# Patient Record
Sex: MALE | Race: WHITE | NOT HISPANIC OR LATINO | Employment: OTHER | URBAN - METROPOLITAN AREA
[De-identification: names, ages, dates, MRNs, and addresses within clinical notes are randomized per-mention and may not be internally consistent; named-entity substitution may affect disease eponyms.]

---

## 2017-08-07 ENCOUNTER — PATIENT OUTREACH (OUTPATIENT)
Dept: HEALTH INFORMATION MANAGEMENT | Facility: OTHER | Age: 67
End: 2017-08-07

## 2017-08-07 NOTE — PROGRESS NOTES
Attempt #:1    WebIZ Checked & Epic Updated: no  [facility] Verified: no  Verify PCP: no    Communication Preference Obtained: no     Review Care Team: no    Annual Wellness Visit Scheduling  1. Scheduling Status:Not Scheduled. Patient states they think this is a scam     Care Gap Scheduling (Attempt to Schedule EACH Overdue Care Gap!)     Health Maintenance Due   Topic Date Due   • Annual Wellness Visit  DECLINED    • IMM DTaP/Tdap/Td Vaccine (1 - Tdap) NOT ABLE TO ADDRESS    • COLONOSCOPY  NOT ABLE TO ADDRESS    • IMM ZOSTER VACCINE  NOT ABLE TO ADDRESS    • IMM PNEUMOCOCCAL 65+ (ADULT) LOW/MEDIUM RISK SERIES (1 of 2 - PCV13) NOT ABLE TO ADDRESS          MyChart Activation: sent activation code  [employer] Sindhu: no  Virtual Visits: no  Opt In to Text Messages: no

## 2018-03-12 ENCOUNTER — APPOINTMENT (RX ONLY)
Dept: URBAN - METROPOLITAN AREA CLINIC 121 | Facility: CLINIC | Age: 68
Setting detail: DERMATOLOGY
End: 2018-03-12

## 2018-03-12 DIAGNOSIS — D485 NEOPLASM OF UNCERTAIN BEHAVIOR OF SKIN: ICD-10-CM

## 2018-03-12 DIAGNOSIS — R60.0 LOCALIZED EDEMA: ICD-10-CM

## 2018-03-12 DIAGNOSIS — L57.0 ACTINIC KERATOSIS: ICD-10-CM

## 2018-03-12 DIAGNOSIS — L82.1 OTHER SEBORRHEIC KERATOSIS: ICD-10-CM

## 2018-03-12 DIAGNOSIS — L81.4 OTHER MELANIN HYPERPIGMENTATION: ICD-10-CM

## 2018-03-12 PROBLEM — D48.5 NEOPLASM OF UNCERTAIN BEHAVIOR OF SKIN: Status: ACTIVE | Noted: 2018-03-12

## 2018-03-12 PROCEDURE — ? TREATMENT REGIMEN

## 2018-03-12 PROCEDURE — ? LIQUID NITROGEN

## 2018-03-12 PROCEDURE — 11100: CPT | Mod: 59

## 2018-03-12 PROCEDURE — ? COUNSELING

## 2018-03-12 PROCEDURE — 17004 DESTROY PREMAL LESIONS 15/>: CPT

## 2018-03-12 PROCEDURE — 99203 OFFICE O/P NEW LOW 30 MIN: CPT | Mod: 25

## 2018-03-12 PROCEDURE — ? BIOPSY BY SHAVE METHOD

## 2018-03-12 PROCEDURE — 69100 BIOPSY OF EXTERNAL EAR: CPT

## 2018-03-12 PROCEDURE — ? REFERRAL CORRESPONDENCE

## 2018-03-12 PROCEDURE — ? PRESCRIPTION

## 2018-03-12 RX ORDER — MUPIROCIN 20 MG/G
OINTMENT TOPICAL
Qty: 1 | Refills: 2 | Status: ERX

## 2018-03-12 ASSESSMENT — LOCATION SIMPLE DESCRIPTION DERM
LOCATION SIMPLE: RIGHT EAR
LOCATION SIMPLE: LEFT CHEEK
LOCATION SIMPLE: RIGHT ZYGOMA
LOCATION SIMPLE: RIGHT PRETIBIAL REGION
LOCATION SIMPLE: RIGHT TEMPLE
LOCATION SIMPLE: NOSE
LOCATION SIMPLE: LEFT TEMPLE
LOCATION SIMPLE: RIGHT FOREHEAD
LOCATION SIMPLE: LEFT FOREHEAD
LOCATION SIMPLE: RIGHT EYEBROW
LOCATION SIMPLE: RIGHT CHEEK
LOCATION SIMPLE: LEFT FOREARM
LOCATION SIMPLE: RIGHT UPPER BACK
LOCATION SIMPLE: LEFT EAR
LOCATION SIMPLE: LEFT PRETIBIAL REGION

## 2018-03-12 ASSESSMENT — LOCATION DETAILED DESCRIPTION DERM
LOCATION DETAILED: LEFT PROXIMAL PRETIBIAL REGION
LOCATION DETAILED: RIGHT CENTRAL ZYGOMA
LOCATION DETAILED: LEFT INFERIOR CENTRAL MALAR CHEEK
LOCATION DETAILED: RIGHT SUPERIOR CENTRAL BUCCAL CHEEK
LOCATION DETAILED: RIGHT INFERIOR UPPER BACK
LOCATION DETAILED: LEFT SUPERIOR LATERAL BUCCAL CHEEK
LOCATION DETAILED: LEFT CYMBA CONCHA
LOCATION DETAILED: RIGHT SUPERIOR MEDIAL FOREHEAD
LOCATION DETAILED: RIGHT FOREHEAD
LOCATION DETAILED: LEFT DISTAL PRETIBIAL REGION
LOCATION DETAILED: LEFT DISTAL DORSAL FOREARM
LOCATION DETAILED: RIGHT PROXIMAL PRETIBIAL REGION
LOCATION DETAILED: LEFT MID TEMPLE
LOCATION DETAILED: RIGHT MEDIAL ZYGOMA
LOCATION DETAILED: LEFT SUPERIOR CENTRAL BUCCAL CHEEK
LOCATION DETAILED: LEFT INFERIOR FOREHEAD
LOCATION DETAILED: RIGHT CENTRAL EYEBROW
LOCATION DETAILED: RIGHT SUPERIOR HELIX
LOCATION DETAILED: RIGHT INFERIOR MEDIAL MALAR CHEEK
LOCATION DETAILED: RIGHT CENTRAL MANDIBULAR CHEEK
LOCATION DETAILED: NASAL ROOT
LOCATION DETAILED: RIGHT SUPERIOR LATERAL MALAR CHEEK
LOCATION DETAILED: RIGHT SUPERIOR MEDIAL MALAR CHEEK
LOCATION DETAILED: RIGHT DISTAL PRETIBIAL REGION
LOCATION DETAILED: LEFT MEDIAL MALAR CHEEK
LOCATION DETAILED: LEFT SUPERIOR TEMPLE
LOCATION DETAILED: RIGHT INFERIOR LATERAL MALAR CHEEK
LOCATION DETAILED: LEFT INFERIOR LATERAL FOREHEAD
LOCATION DETAILED: RIGHT LATERAL TEMPLE

## 2018-03-12 ASSESSMENT — LOCATION ZONE DERM
LOCATION ZONE: LEG
LOCATION ZONE: FACE
LOCATION ZONE: TRUNK
LOCATION ZONE: EAR
LOCATION ZONE: NOSE
LOCATION ZONE: ARM

## 2018-03-12 NOTE — HPI: EVALUATION OF SKIN LESION(S)
How Severe Are Your Spot(S)?: moderate
Have Your Spot(S) Been Treated In The Past?: has not been treated
Hpi Title: Evaluation of Skin Lesions
Additional History: Patient presents for a full body skin exam

## 2018-03-12 NOTE — PROCEDURE: BIOPSY BY SHAVE METHOD
Render Post-Care Instructions In Note?: no
Dressing: bandage
Electrodesiccation And Curettage Text: The wound bed was treated with electrodesiccation and curettage after the biopsy was performed.
Billing Type: [employer]
Post-Care Instructions: I reviewed with the patient in detail post-care instructions. Patient is to keep the biopsy site dry overnight, and then apply bacitracin twice daily until healed. Patient may apply hydrogen peroxide soaks to remove any crusting.
X Size Of Lesion In Cm: 0
Type Of Destruction Used: Curettage
Silver Nitrate Text: The wound bed was treated with silver nitrate after the biopsy was performed.
Anesthesia Volume In Cc (Will Not Render If 0): 0.5
Detail Level: Detailed
Biopsy Method: 15 blade
Size Of Lesion In Cm: 0.6
Path Notes (To The Dermatopathologist): 0.6cm
Notification Instructions: Patient will be notified of biopsy results. However, patient instructed to call the office if not contacted within 2 weeks.
Anesthesia Type: 1% lidocaine with epinephrine and a 1:10 solution of 8.4% sodium bicarbonate
Curettage Text: The wound bed was treated with curettage after the biopsy was performed.
Wound Care: Vaseline
Biopsy Type: H and E
Electrodesiccation Text: The wound bed was treated with electrodesiccation after the biopsy was performed.
Cryotherapy Text: The wound bed was treated with cryotherapy after the biopsy was performed.
Hemostasis: Aluminum Chloride
Lab: [id number] [facility] [address]
Consent: Written consent was obtained and risks were reviewed including but not limited to scarring, infection, bleeding, scabbing, incomplete removal, nerve damage and allergy to anesthesia.
Size Of Lesion In Cm: 1.1
Lab: [id number]
Billing Type: Third-Party Bill
Path Notes (To The Dermatopathologist): 1.1cm
Biopsy Method: Personna blade

## 2018-03-15 ENCOUNTER — APPOINTMENT (RX ONLY)
Dept: URBAN - METROPOLITAN AREA CLINIC 43 | Facility: CLINIC | Age: 68
Setting detail: DERMATOLOGY
End: 2018-03-15

## 2018-03-15 DIAGNOSIS — Z01.818 ENCOUNTER FOR OTHER PREPROCEDURAL EXAMINATION: ICD-10-CM

## 2018-03-15 PROCEDURE — ? PRESCRIPTION

## 2018-03-15 PROCEDURE — ? COUNSELING

## 2018-03-15 RX ORDER — AMOXICILLIN 500 MG/1
CAPSULE ORAL
Qty: 4 | Refills: 0 | Status: ERX | COMMUNITY
Start: 2018-03-15

## 2018-03-15 RX ADMIN — AMOXICILLIN: 500 CAPSULE ORAL at 00:00

## 2018-03-20 ENCOUNTER — APPOINTMENT (RX ONLY)
Dept: URBAN - METROPOLITAN AREA CLINIC 121 | Facility: CLINIC | Age: 68
Setting detail: DERMATOLOGY
End: 2018-03-20

## 2018-03-20 PROBLEM — D03.62 MELANOMA IN SITU OF LEFT UPPER LIMB, INCLUDING SHOULDER: Status: ACTIVE | Noted: 2018-03-20

## 2018-03-20 PROCEDURE — ? MOHS SURGERY

## 2018-03-20 PROCEDURE — 17314 MOHS ADDL STAGE T/A/L: CPT | Mod: 79

## 2018-03-20 PROCEDURE — ? REPAIR NOTE

## 2018-03-20 PROCEDURE — 13121 CMPLX RPR S/A/L 2.6-7.5 CM: CPT | Mod: 79

## 2018-03-20 PROCEDURE — ? ADDITIONAL PATHOLOGY SPECIMEN

## 2018-03-20 PROCEDURE — 17313 MOHS 1 STAGE T/A/L: CPT | Mod: 79

## 2018-03-20 NOTE — PROCEDURE: MOHS SURGERY
Advancement Flap (Double) Text: The defect edges were debeveled with a #15 scalpel blade. Given the location of the defect and the proximity to free margins a double advancement flap was deemed most appropriate. Using a sterile surgical marker, the appropriate advancement flaps were drawn incorporating the defect and placing the expected incisions within the relaxed skin tension lines where possible. The area thus outlined was incised deep to adipose tissue with a #15 scalpel blade. The skin margins were undermined to an appropriate distance in all directions utilizing iris scissors.
O-Z Plasty Text: The defect edges were debeveled with a #15 scalpel blade. Given the location of the defect, shape of the defect and the proximity to free margins an O-Z plasty (double transposition flap) was deemed most appropriate. Using a sterile surgical marker, the appropriate transposition flaps were drawn incorporating the defect and placing the expected incisions within the relaxed skin tension lines where possible. The area thus outlined was incised deep to adipose tissue with a #15 scalpel blade. The skin margins were undermined to an appropriate distance in all directions utilizing iris scissors. Hemostasis was achieved with electrocautery. The flaps were then transposed into place, one clockwise and the other counterclockwise, and anchored with interrupted buried subcutaneous sutures.
Stage 2: Additional Anesthesia Volume In Cc: 0
Stage 7: Additional Anesthesia Type: 1% lidocaine with epinephrine
Consent (Temporal Branch)/Introductory Paragraph: The rationale for Mohs was explained to the patient and consent was obtained. The risks, benefits and alternatives to therapy were discussed in detail. Specifically, the risks of damage to the temporal branch of the facial nerve, infection, scarring, bleeding, prolonged wound healing, incomplete removal, allergy to anesthesia, and recurrence were addressed. Prior to the procedure, the treatment site was clearly identified and confirmed by the patient. All components of Universal Protocol/PAUSE Rule completed.
Dermal Autograft Text: The defect edges were debeveled with a #15 scalpel blade. Given the location of the defect, shape of the defect and the proximity to free margins a dermal autograft was deemed most appropriate. Using a sterile surgical marker, the primary defect shape was transferred to the donor site. The area thus outlined was incised deep to adipose tissue with a #15 scalpel blade. The harvested graft was then trimmed of adipose and epidermal tissue until only dermis was left. The skin graft was then placed in the primary defect and oriented appropriately.
Crescentic Complex Repair Preamble Text (Leave Blank If You Do Not Want): Extensive wide undermining was performed.
Closure 4 Information: This tab is for additional flaps and grafts above and beyond our usual structured repairs. Please note if you enter information here it will not currently bill and you will need to add the billing information manually.
Consent (Scalp)/Introductory Paragraph: The rationale for Mohs was explained to the patient and consent was obtained. The risks, benefits and alternatives to therapy were discussed in detail. Specifically, the risks of changes in hair growth pattern secondary to repair, infection, scarring, bleeding, prolonged wound healing, incomplete removal, allergy to anesthesia, nerve injury and recurrence were addressed. Prior to the procedure, the treatment site was clearly identified and confirmed by the patient. All components of Universal Protocol/PAUSE Rule completed.
Scc In Situ Histology Text: There is hyperkeratosis, acanthosis, and cytologic atypia of keratinocytes with hyperchromatic and pleomorphic nuclei throughout the full thickness of the epidermis. No dermal invasion is seen. Chronic inflammation is present in the dermis.
Mohs Method Verbiage: An incision at a 45 degree angle following the standard Mohs approach was done and the specimen was harvested as a microscopic controlled layer.
Date Of Previous Biopsy (Optional): 03-12-18
Trilobed Flap Text: The defect edges were debeveled with a #15 scalpel blade. Given the location of the defect and the proximity to free margins a trilobed flap was deemed most appropriate. Using a sterile surgical marker, an appropriate trilobed flap drawn around the defect. The area thus outlined was incised deep to adipose tissue with a #15 scalpel blade. The skin margins were undermined to an appropriate distance in all directions utilizing iris scissors.
Hatchet Flap Text: The defect edges were debeveled with a #15 scalpel blade. Given the location of the defect, shape of the defect and the proximity to free margins a hatchet flap was deemed most appropriate. Using a sterile surgical marker, an appropriate hatchet flap was drawn incorporating the defect and placing the expected incisions within the relaxed skin tension lines where possible. The area thus outlined was incised deep to adipose tissue with a #15 scalpel blade. The skin margins were undermined to an appropriate distance in all directions utilizing iris scissors.
Referred To Plastics For Closure Text (Leave Blank If You Do Not Want): After obtaining clear surgical margins the patient was sent to plastics for surgical repair. The patient understands they will receive post-surgical care and follow-up from the referring physician's office.
Modified Advancement Flap Text: The defect edges were debeveled with a #15 scalpel blade. Given the location of the defect, shape of the defect and the proximity to free margins a modified advancement flap was deemed most appropriate. Using a sterile surgical marker, an appropriate advancement flap was drawn incorporating the defect and placing the expected incisions within the relaxed skin tension lines where possible. The area thus outlined was incised deep to adipose tissue with a #15 scalpel blade. The skin margins were undermined to an appropriate distance in all directions utilizing iris scissors.
Include Size Of Lesion In Location Indication Statement: Yes
Hemostasis: Electrocautery
Split-Thickness Skin Graft Text: The defect edges were debeveled with a #15 scalpel blade. Given the location of the defect, shape of the defect and the proximity to free margins a split thickness skin graft was deemed most appropriate. Using a sterile surgical marker, the primary defect shape was transferred to the donor site. The split thickness graft was then harvested. The skin graft was then placed in the primary defect and oriented appropriately.
Scc Histology Text: There is hyperkeratosis, acanthosis, and cytologic atypia of keratinocytes with hyperchromatic and pleomorphic nuclei throughout the epidermis. No dermal invasion is seen. Chronic inflammation is present in the dermis.
Helical Rim Advancement Flap Text: The defect edges were debeveled with a #15 blade scalpel. Given the location of the defect and the proximity to free margins (helical rim) a double helical rim advancement flap was deemed most appropriate. Using a sterile surgical marker, the appropriate advancement flaps were drawn incorporating the defect and placing the expected incisions between the helical rim and antihelix where possible. The area thus outlined was incised through and through with a #15 scalpel blade. With a skin hook and iris scissors, the flaps were gently and sharply undermined and freed up.
Consent Type: Consent 1 (Standard)
Bcc Infiltrative Histology Text: There were numerous aggregates of basaloid cells demonstrating an infiltrative pattern
Muscle Hinge Flap Text: The defect edges were debeveled with a #15 scalpel blade. Given the size, depth and location of the defect and the proximity to free margins a muscle hinge flap was deemed most appropriate. Using a sterile surgical marker, an appropriate hinge flap was drawn incorporating the defect. The area thus outlined was incised with a #15 scalpel blade. The skin margins were undermined to an appropriate distance in all directions utilizing iris scissors.
Epidermal Sutures: 6-0 Ethilon
Posterior Auricular Interpolation Flap Text: A decision was made to reconstruct the defect utilizing an interpolation axial flap and a staged reconstruction. A telfa template was made of the defect. This telfa template was then used to outline the posterior auricular interpolation flap. The donor area for the pedicle flap was then injected with anesthesia. The flap was excised through the skin and subcutaneous tissue down to the layer of the underlying musculature. The pedicle flap was carefully excised within this deep plane to maintain its blood supply. The edges of the donor site were undermined. The donor site was closed in a primary fashion. The pedicle was then rotated into position and sutured. Once the tube was sutured into place, adequate blood supply was confirmed with blanching and refill. The pedicle was then wrapped with xeroform gauze and dressed appropriately with a telfa and gauze bandage to ensure continued blood supply and protect the attached pedicle.
Consent (Spinal Accessory)/Introductory Paragraph: The rationale for Mohs was explained to the patient and consent was obtained. The risks, benefits and alternatives to therapy were discussed in detail. Specifically, the risks of damage to the spinal accessory nerve, infection, scarring, bleeding, prolonged wound healing, incomplete removal, allergy to anesthesia, and recurrence were addressed. Prior to the procedure, the treatment site was clearly identified and confirmed by the patient. All components of Universal Protocol/PAUSE Rule completed.
Mauc Instructions: By selecting yes to the question below the [facility] number will be added into the note. This will be calculated automatically based on the diagnosis chosen, the size entered, the body zone selected (H,M,L) and the specific indications you chose. You will also have the option to override the Mohs AUC if you disagree with the automatically calculated number and this option is found in the Case Summary tab.
Suture Removal: 7 days
Unna Boot Applied: No
V-Y Flap Text: The defect edges were debeveled with a #15 scalpel blade. Given the location of the defect, shape of the defect and the proximity to free margins a V-Y flap was deemed most appropriate. Using a sterile surgical marker, an appropriate advancement flap was drawn incorporating the defect and placing the expected incisions within the relaxed skin tension lines where possible. The area thus outlined was incised deep to adipose tissue with a #15 scalpel blade. The skin margins were undermined to an appropriate distance in all directions utilizing iris scissors.
Surgical Defect Width In Cm (Optional): 3
Consent 2/Introductory Paragraph: Mohs surgery was explained to the patient and consent was obtained. The risks, benefits and alternatives to therapy were discussed in detail. Specifically, the risks of infection, scarring, bleeding, prolonged wound healing, incomplete removal, allergy to anesthesia, nerve injury and recurrence were addressed. Prior to the procedure, the treatment site was clearly identified and confirmed by the patient. All components of Universal Protocol/PAUSE Rule completed.
H Plasty Text: Given the location of the defect, shape of the defect and the proximity to free margins a H-plasty was deemed most appropriate for repair. Using a sterile surgical marker, the appropriate advancement arms of the H-plasty were drawn incorporating the defect and placing the expected incisions within the relaxed skin tension lines where possible. The area thus outlined was incised deep to adipose tissue with a #15 scalpel blade. The skin margins were undermined to an appropriate distance in all directions utilizing iris scissors. The opposing advancement arms were then advanced into place in opposite direction and anchored with interrupted buried subcutaneous sutures.
Full Thickness Lip Wedge Repair (Flap) Text: Given the location of the defect and the proximity to free margins a full thickness wedge repair was deemed most appropriate. Using a sterile surgical marker, the appropriate repair was drawn incorporating the defect and placing the expected incisions perpendicular to the vermilion border. The vermilion border was also meticulously outlined to ensure appropriate reapproximation during the repair. The area thus outlined was incised through and through with a #15 scalpel blade. The muscularis and dermis were reaproximated with deep sutures following hemostasis. Care was taken to realign the vermilion border before proceeding with the superficial closure. Once the vermilion was realigned the superfical and mucosal closure was finished.
Bilobed Flap Text: The defect edges were debeveled with a #15 scalpel blade. Given the location of the defect and the proximity to free margins a bilobe flap was deemed most appropriate. Using a sterile surgical marker, an appropriate bilobe flap drawn around the defect. The area thus outlined was incised deep to adipose tissue with a #15 scalpel blade. The skin margins were undermined to an appropriate distance in all directions utilizing iris scissors.
Area L Indication Text: Tumors in this location are included in Area L (trunk and extremities). Mohs surgery is indicated for larger tumors, or tumors with aggressive histologic features, in these anatomic locations.
Estimated Blood Loss (Cc): minimal
Interpolation Flap Text: A decision was made to reconstruct the defect utilizing an interpolation axial flap and a staged reconstruction. A telfa template was made of the defect. This telfa template was then used to outline the interpolation flap. The donor area for the pedicle flap was then injected with anesthesia. The flap was excised through the skin and subcutaneous tissue down to the layer of the underlying musculature. The interpolation flap was carefully excised within this deep plane to maintain its blood supply. The edges of the donor site were undermined. The donor site was closed in a primary fashion. The pedicle was then rotated into position and sutured. Once the tube was sutured into place, adequate blood supply was confirmed with blanching and refill. The pedicle was then wrapped with xeroform gauze and dressed appropriately with a telfa and gauze bandage to ensure continued blood supply and protect the attached pedicle.
Rhombic Flap Text: The defect edges were debeveled with a #15 scalpel blade. Given the location of the defect and the proximity to free margins a rhombic flap was deemed most appropriate. Using a sterile surgical marker, an appropriate rhombic flap was drawn incorporating the defect. The area thus outlined was incised deep to adipose tissue with a #15 scalpel blade. The skin margins were undermined to an appropriate distance in all directions utilizing iris scissors.
Island Pedicle Flap Text: The defect edges were debeveled with a #15 scalpel blade. Given the location of the defect, shape of the defect and the proximity to free margins an island pedicle advancement flap was deemed most appropriate. Using a sterile surgical marker, an appropriate advancement flap was drawn incorporating the defect, outlining the appropriate donor tissue and placing the expected incisions within the relaxed skin tension lines where possible. The area thus outlined was incised deep to adipose tissue with a #15 scalpel blade. The skin margins were undermined to an appropriate distance in all directions around the primary defect and laterally outward around the island pedicle utilizing iris scissors. There was minimal undermining beneath the pedicle flap.
O-L Flap Text: The defect edges were debeveled with a #15 scalpel blade. Given the location of the defect, shape of the defect and the proximity to free margins an O-L flap was deemed most appropriate. Using a sterile surgical marker, an appropriate advancement flap was drawn incorporating the defect and placing the expected incisions within the relaxed skin tension lines where possible. The area thus outlined was incised deep to adipose tissue with a #15 scalpel blade. The skin margins were undermined to an appropriate distance in all directions utilizing iris scissors.
Same Histology In Subsequent Stages Text: The pattern and morphology of the tumor is as described in the first stage.
Purse String (Intermediate) Text: Given the location of the defect and the characteristics of the surrounding skin a pursestring intermediate closure was deemed most appropriate. Undermining was performed circumfirentially around the surgical defect. A purstring suture was then placed and tightened.
Tarsorrhaphy Text: A tarsorrhaphy was performed using Frost sutures.
Consent (Nose)/Introductory Paragraph: The rationale for Mohs was explained to the patient and consent was obtained. The risks, benefits and alternatives to therapy were discussed in detail. Specifically, the risks of nasal deformity, changes in the flow of air through the nose, infection, scarring, bleeding, prolonged wound healing, incomplete removal, allergy to anesthesia, nerve injury and recurrence were addressed. Prior to the procedure, the treatment site was clearly identified and confirmed by the patient. All components of Universal Protocol/PAUSE Rule completed.
Crescentic Advancement Flap Text: The defect edges were debeveled with a #15 scalpel blade. Given the location of the defect and the proximity to free margins a crescentic advancement flap was deemed most appropriate. Using a sterile surgical marker, the appropriate advancement flap was drawn incorporating the defect and placing the expected incisions within the relaxed skin tension lines where possible. The area thus outlined was incised deep to adipose tissue with a #15 scalpel blade. The skin margins were undermined to an appropriate distance in all directions utilizing iris scissors.
Depth Of Tumor Invasion (For Histology): epidermis
Closure 3 Information: This tab is for additional flaps and grafts above and beyond our usual structured repairs.  Please note if you enter information here it will not currently bill and you will need to add the billing information manually.
Consent (Near Eyelid Margin)/Introductory Paragraph: The rationale for Mohs was explained to the patient and consent was obtained. The risks, benefits and alternatives to therapy were discussed in detail. Specifically, the risks of ectropion or eyelid deformity, infection, scarring, bleeding, prolonged wound healing, incomplete removal, allergy to anesthesia, nerve injury and recurrence were addressed. Prior to the procedure, the treatment site was clearly identified and confirmed by the patient. All components of Universal Protocol/PAUSE Rule completed.
Stage 2: Number Of Blocks?: 1
Home Suture Removal Text: Patient was provided instructions on removing sutures and will remove their sutures at home. If they have any questions or difficulties they will call the office.
Surgical Defect Width In Cm (Optional): 2.5
Previous Accession (Optional): [id number]
Composite Graft Text: The defect edges were debeveled with a #15 scalpel blade. Given the location of the defect, shape of the defect, the proximity to free margins and the fact the defect was full thickness a composite graft was deemed most appropriate. The defect was outline and then transferred to the donor site. A full thickness graft was then excised from the donor site. The graft was then placed in the primary defect, oriented appropriately and then sutured into place. The secondary defect was then repaired using a primary closure.
Repair Type: Referred to plastics for closure
Mohs Case Number: [id number]
Advancement Flap (Single) Text: The defect edges were debeveled with a #15 scalpel blade. Given the location of the defect and the proximity to free margins a single advancement flap was deemed most appropriate. Using a sterile surgical marker, an appropriate advancement flap was drawn incorporating the defect and placing the expected incisions within the relaxed skin tension lines where possible. The area thus outlined was incised deep to adipose tissue with a #15 scalpel blade. The skin margins were undermined to an appropriate distance in all directions utilizing iris scissors.
Alar Island Pedicle Flap Text: The defect edges were debeveled with a #15 scalpel blade. Given the location of the defect, shape of the defect and the proximity to the alar rim an island pedicle advancement flap was deemed most appropriate. Using a sterile surgical marker, an appropriate advancement flap was drawn incorporating the defect, outlining the appropriate donor tissue and placing the expected incisions within the nasal ala running parallel to the alar rim. The area thus outlined was incised with a #15 scalpel blade. The skin margins were undermined minimally to an appropriate distance in all directions around the primary defect and laterally outward around the island pedicle utilizing iris scissors. There was minimal undermining beneath the pedicle flap.
Medical Necessity Statement: Based on my medical judgement; after reviewing the pertinent pathology report, physical exam findings and the various treatment options for skin cancer treatment; standard excision and/or destruction technique methods are not clinically sufficient treatment options. To prevent the risk of compromising surgical cure and reconstruction; prompt microscopic examination of the surgical margins is necessary. Based on the given indication(s), maximum conservation of healthy tissue, and high cure rate, Mohs surgery is the most appropriate treatment for this cancer.
Spiral Flap Text: The defect edges were debeveled with a #15 scalpel blade. Given the location of the defect, shape of the defect and the proximity to free margins a spiral flap was deemed most appropriate. Using a sterile surgical marker, an appropriate rotation flap was drawn incorporating the defect and placing the expected incisions within the relaxed skin tension lines where possible. The area thus outlined was incised deep to adipose tissue with a #15 scalpel blade. The skin margins were undermined to an appropriate distance in all directions utilizing iris scissors.
Z Plasty Text: The lesion was extirpated to the level of the fat with a #15 scalpel blade. Given the location of the defect, shape of the defect and the proximity to free margins a Z-plasty was deemed most appropriate for repair. Using a sterile surgical marker, the appropriate transposition arms of the Z-plasty were drawn incorporating the defect and placing the expected incisions within the relaxed skin tension lines where possible. The area thus outlined was incised deep to adipose tissue with a #15 scalpel blade. The skin margins were undermined to an appropriate distance in all directions utilizing iris scissors. The opposing transposition arms were then transposed into place in opposite direction and anchored with interrupted buried subcutaneous sutures.
Keystone Flap Text: The defect edges were debeveled with a #15 scalpel blade. Given the location of the defect, shape of the defect a keystone flap was deemed most appropriate. Using a sterile surgical marker, an appropriate keystone flap was drawn incorporating the defect, outlining the appropriate donor tissue and placing the expected incisions within the relaxed skin tension lines where possible. The area thus outlined was incised deep to adipose tissue with a #15 scalpel blade. The skin margins were undermined to an appropriate distance in all directions around the primary defect and laterally outward around the flap utilizing iris scissors.
Wound Care: Bacitracin
Number Of Stages: 2
Cheiloplasty (Complex) Text: A decision was made to reconstruct the defect with a  cheiloplasty. The defect was undermined extensively. Additional obicularis oris muscle was excised with a 15 blade scalpel. The defect was converted into a full thickness wedge to facilite a better cosmetic result. Small vessels were then tied off with 5-0 monocyrl. The obicularis oris, superficial fascia, adipose and dermis were then reapproximated. After the deeper layers were approximated the epidermis was reapproximated with particular care given to realign the vermilion border.
Cheek-To-Nose Interpolation Flap Text: A decision was made to reconstruct the defect utilizing an interpolation axial flap and a staged reconstruction. A telfa template was made of the defect. This telfa template was then used to outline the Cheek-To-Nose Interpolation flap. The donor area for the pedicle flap was then injected with anesthesia. The flap was excised through the skin and subcutaneous tissue down to the layer of the underlying musculature. The interpolation flap was carefully excised within this deep plane to maintain its blood supply. The edges of the donor site were undermined. The donor site was closed in a primary fashion. The pedicle was then rotated into position and sutured. Once the tube was sutured into place, adequate blood supply was confirmed with blanching and refill. The pedicle was then wrapped with xeroform gauze and dressed appropriately with a telfa and gauze bandage to ensure continued blood supply and protect the attached pedicle.
Complex Repair And Graft Additional Text (Will Appearing After The Standard Complex Repair Text): The complex repair was not sufficient to completely close the primary defect. The remaining additional defect was repaired with the graft mentioned below.
Skin Substitute Text: The defect edges were debeveled with a #15 scalpel blade. Given the location of the defect, shape of the defect and the proximity to free margins a skin substitute graft was deemed most appropriate. The graft material was trimmed to fit the size of the defect. The graft was then placed in the primary defect and oriented appropriately.
Additional Anesthesia Volume In Cc: 6
Xenograft Text: The defect edges were debeveled with a #15 scalpel blade. Given the location of the defect, shape of the defect and the proximity to free margins a xenograft was deemed most appropriate. The graft was then trimmed to fit the size of the defect. The graft was then placed in the primary defect and oriented appropriately.
Bcc  Nodular Histology Text: There are uniform nodular masses of basaloid neoplastic cells with scanty cytoplasm and peripheral palisading with a loose fibrous stroma. The appearance is typical for a nodular basal cell carcinoma.
Initial Size Of Lesion: 2.2
Inflammation Suggestive Of Cancer Camouflage Histology Text: There was a dense lymphocytic infiltrate which prevented adequate histologic evaluation of adjacent structures.
Presence Of Scar Tissue (For Histology): absent
No Repair - Repaired With Adjacent Surgical Defect Text (Leave Blank If You Do Not Want): After obtaining clear surgical margins the defect was repaired concurrently with another surgical defect which was in close approximation.
Surgeon/Pathologist Verbiage (Will Incorporate Name Of Surgeon From Intro If Not Blank): operated in two distinct and integrated capacities as the surgeon and pathologist.
Referred To Mid-Level For Closure Text (Leave Blank If You Do Not Want): After obtaining clear surgical margins the patient was sent to a mid-level provider for surgical repair. The patient understands they will receive post-surgical care and follow-up from the mid-level provider.
Consent 1/Introductory Paragraph: Various treatment options for skin cancer treatment; including but not limited to no treatment, cryosurgery or cryotherapy, excision, radiation therapy, electrodessication and curettage, topical therapeutic agents and light therapy were discussed in depth with the patient. The rationale for Mohs was explained to the patient and consent was obtained. The risks, benefits and alternatives to therapy were discussed in detail. Specifically, the risks of infection, scarring, bleeding, prolonged wound healing, incomplete removal, allergy to anesthesia, nerve injury and recurrence were addressed. Prior to the procedure, the treatment site was clearly identified and confirmed by the patient and the patient agreed that Mohs surgery is the best treatment option for their lesion. No guarantees were made or implied; close follow-up was stressed out to the patient. All components of Universal Protocol/PAUSE Rule completed. treatment site was clearly identified and confirmed by the patient. All components of Universal Protocol/PAUSE Rule completed.
Bilateral Helical Rim Advancement Flap Text: The defect edges were debeveled with a #15 blade scalpel. Given the location of the defect and the proximity to free margins (helical rim) a bilateral helical rim advancement flap was deemed most appropriate. Using a sterile surgical marker, the appropriate advancement flaps were drawn incorporating the defect and placing the expected incisions between the helical rim and antihelix where possible. The area thus outlined was incised through and through with a #15 scalpel blade. With a skin hook and iris scissors, the flaps were gently and sharply undermined and freed up.
Lazy S Intermediate Repair Preamble Text (Leave Blank If You Do Not Want): Undermining was performed with blunt dissection.
Mastoid Interpolation Flap Text: A decision was made to reconstruct the defect utilizing an interpolation axial flap and a staged reconstruction. A telfa template was made of the defect. This telfa template was then used to outline the mastoid interpolation flap. The donor area for the pedicle flap was then injected with anesthesia. The flap was excised through the skin and subcutaneous tissue down to the layer of the underlying musculature. The pedicle flap was carefully excised within this deep plane to maintain its blood supply. The edges of the donor site were undermined. The donor site was closed in a primary fashion. The pedicle was then rotated into position and sutured. Once the tube was sutured into place, adequate blood supply was confirmed with blanching and refill. The pedicle was then wrapped with xeroform gauze and dressed appropriately with a telfa and gauze bandage to ensure continued blood supply and protect the attached pedicle.
Bi-Rhombic Flap Text: The defect edges were debeveled with a #15 scalpel blade. Given the location of the defect and the proximity to free margins a bi-rhombic flap was deemed most appropriate. Using a sterile surgical marker, an appropriate rhombic flap was drawn incorporating the defect. The area thus outlined was incised deep to adipose tissue with a #15 scalpel blade. The skin margins were undermined to an appropriate distance in all directions utilizing iris scissors.
Transposition Flap Text: The defect edges were debeveled with a #15 scalpel blade. Given the location of the defect and the proximity to free margins a transposition flap was deemed most appropriate. Using a sterile surgical marker, an appropriate transposition flap was drawn incorporating the defect. The area thus outlined was incised deep to adipose tissue with a #15 scalpel blade. The skin margins were undermined to an appropriate distance in all directions utilizing iris scissors.
Surgical Defect Length In Cm (Optional): 3.5
Ear Wedge Repair Text: A wedge excision was completed by carrying down an excision through the full thickness of the ear and cartilage with an inward facing Burow's triangle. The wound was then closed in a layered fashion.
Ftsg Text: The defect edges were debeveled with a #15 scalpel blade. Given the location of the defect, shape of the defect and the proximity to free margins a full thickness skin graft was deemed most appropriate. Using a sterile surgical marker, the primary defect shape was transferred to the donor site. The area thus outlined was incised deep to adipose tissue with a #15 scalpel blade. The harvested graft was then trimmed of adipose tissue until only dermis and epidermis was left. The skin margins of the secondary defect were undermined to an appropriate distance in all directions utilizing iris scissors. The secondary defect was closed with interrupted buried subcutaneous sutures. The skin edges were then re-apposed with running  sutures. The skin graft was then placed in the primary defect and oriented appropriately.
Referred To Otolaryngology For Closure Text (Leave Blank If You Do Not Want): After obtaining clear surgical margins the patient was sent to otolaryngology for surgical repair. The patient understands they will receive post-surgical care and follow-up from the referring physician's office.
Dorsal Nasal Flap Text: The defect edges were debeveled with a #15 scalpel blade. Given the location of the defect and the proximity to free margins a dorsal nasal flap was deemed most appropriate. Using a sterile surgical marker, an appropriate dorsal nasal flap was drawn around the defect. The area thus outlined was incised deep to adipose tissue with a #15 scalpel blade. The skin margins were undermined to an appropriate distance in all directions utilizing iris scissors.
Melolabial Interpolation Flap Text: A decision was made to reconstruct the defect utilizing an interpolation axial flap and a staged reconstruction. A telfa template was made of the defect. This telfa template was then used to outline the melolabial interpolation flap. The donor area for the pedicle flap was then injected with anesthesia. The flap was excised through the skin and subcutaneous tissue down to the layer of the underlying musculature. The pedicle flap was carefully excised within this deep plane to maintain its blood supply. The edges of the donor site were undermined. The donor site was closed in a primary fashion. The pedicle was then rotated into position and sutured. Once the tube was sutured into place, adequate blood supply was confirmed with blanching and refill. The pedicle was then wrapped with xeroform gauze and dressed appropriately with a telfa and gauze bandage to ensure continued blood supply and protect the attached pedicle.
Consent (Marginal Mandibular)/Introductory Paragraph: The rationale for Mohs was explained to the patient and consent was obtained. The risks, benefits and alternatives to therapy were discussed in detail. Specifically, the risks of damage to the marginal mandibular branch of the facial nerve, infection, scarring, bleeding, prolonged wound healing, incomplete removal, allergy to anesthesia, and recurrence were addressed. Prior to the procedure, the treatment site was clearly identified and confirmed by the patient. All components of Universal Protocol/PAUSE Rule completed.
Purse String (Simple) Text: Given the location of the defect and the characteristics of the surrounding skin a pursestring closure was deemed most appropriate. Undermining was performed circumfirentially around the surgical defect. A purstring suture was then placed and tightened.
Wound Care (No Sutures): Petrolatum
W Plasty Text: The lesion was extirpated to the level of the fat with a #15 scalpel blade. Given the location of the defect, shape of the defect and the proximity to free margins a W-plasty was deemed most appropriate for repair. Using a sterile surgical marker, the appropriate transposition arms of the W-plasty were drawn incorporating the defect and placing the expected incisions within the relaxed skin tension lines where possible. The area thus outlined was incised deep to adipose tissue with a #15 scalpel blade. The skin margins were undermined to an appropriate distance in all directions utilizing iris scissors. The opposing transposition arms were then transposed into place in opposite direction and anchored with interrupted buried subcutaneous sutures.
O-T Advancement Flap Text: The defect edges were debeveled with a #15 scalpel blade. Given the location of the defect, shape of the defect and the proximity to free margins an O-T advancement flap was deemed most appropriate. Using a sterile surgical marker, an appropriate advancement flap was drawn incorporating the defect and placing the expected incisions within the relaxed skin tension lines where possible. The area thus outlined was incised deep to adipose tissue with a #15 scalpel blade. The skin margins were undermined to an appropriate distance in all directions utilizing iris scissors.
Consent 3/Introductory Paragraph: I gave the patient a chance to ask questions they had about the procedure. Following this I explained the Mohs procedure and consent was obtained. The risks, benefits and alternatives to therapy were discussed in detail. Specifically, the risks of infection, scarring, bleeding, prolonged wound healing, incomplete removal, allergy to anesthesia, nerve injury and recurrence were addressed. Prior to the procedure, the treatment site was clearly identified and confirmed by the patient. All components of Universal Protocol/PAUSE Rule completed.
Stage 2: Additional Anesthesia Type: 1% lidocaine with epinephrine and a 1:10 solution of 8.4% sodium bicarbonate
Deep Sutures: 5-0 Vicryl
Postop Diagnosis: same
Bilobed Transposition Flap Text: The defect edges were debeveled with a #15 scalpel blade. Given the location of the defect and the proximity to free margins a bilobed transposition flap was deemed most appropriate. Using a sterile surgical marker, an appropriate bilobe flap drawn around the defect. The area thus outlined was incised deep to adipose tissue with a #15 scalpel blade. The skin margins were undermined to an appropriate distance in all directions utilizing iris scissors.
Area M Indication Text: Tumors in this location are included in Area M (cheek, forehead, scalp, neck, jawline and pretibial skin). Mohs surgery is indicated for tumors in these anatomic locations.
Mucosal Advancement Flap Text: Given the location of the defect, shape of the defect and the proximity to free margins a mucosal advancement flap was deemed most appropriate. Incisions were made with a 15 blade scalpel in the appropriate fashion along the cutaneous vermilion border and the mucosal lip. The remaining actinically damaged mucosal tissue was excised. The mucosal advancement flap was then elevated to the gingival sulcus with care taken to preserve the neurovascular structures and advanced into the primary defect. Care was taken to ensure that precise realignment of the vermilion border was achieved.
Consent (Lip)/Introductory Paragraph: The rationale for Mohs was explained to the patient and consent was obtained. The risks, benefits and alternatives to therapy were discussed in detail. Specifically, the risks of lip deformity, changes in the oral aperture, infection, scarring, bleeding, prolonged wound healing, incomplete removal, allergy to anesthesia, nerve injury and recurrence were addressed. Prior to the procedure, the treatment site was clearly identified and confirmed by the patient. All components of Universal Protocol/PAUSE Rule completed.
Paramedian Forehead Flap Text: A decision was made to reconstruct the defect utilizing an interpolation axial flap and a staged reconstruction. A telfa template was made of the defect. This telfa template was then used to outline the paramedian forehead pedicle flap. The donor area for the pedicle flap was then injected with anesthesia. The flap was excised through the skin and subcutaneous tissue down to the layer of the underlying musculature. The pedicle flap was carefully excised within this deep plane to maintain its blood supply. The edges of the donor site were undermined. The donor site was closed in a primary fashion. The pedicle was then rotated into position and sutured. Once the tube was sutured into place, adequate blood supply was confirmed with blanching and refill. The pedicle was then wrapped with xeroform gauze and dressed appropriately with a telfa and gauze bandage to ensure continued blood supply and protect the attached pedicle.
Melanoma In Situ Histology Text: On a sun-damaged skin, there is a broad and asymmetrical proliferation of enlarged and atypical melanocytes present continuously as single cells and in small irregular coalescing nests along the dermoepidermal junction. The melanocytes extend into the superficial portions of epithelial structures of adnexa. Pagetoid spread of melanocytes is appreciated. Occasional mitotic figures and individually necrotic melanocytes are also noted. In the underlying papillary dermis, there is mild lymphocytic inflammatory infiltrate with prominent dermal fibroplasia and melanophages.
Post-Care Instructions: I reviewed with the patient in detail post-care instructions. Patient is not to engage in any heavy lifting, exercise, or swimming for the next 14 days. Should the patient develop any fevers, chills, bleeding, severe pain patient will contact the office immediately.
Advancement-Rotation Flap Text: The defect edges were debeveled with a #15 scalpel blade. Given the location of the defect, shape of the defect and the proximity to free margins an advancement-rotation flap was deemed most appropriate. Using a sterile surgical marker, an appropriate flap was drawn incorporating the defect and placing the expected incisions within the relaxed skin tension lines where possible. The area thus outlined was incised deep to adipose tissue with a #15 scalpel blade. The skin margins were undermined to an appropriate distance in all directions utilizing iris scissors.
Rotation Flap Text: The defect edges were debeveled with a #15 scalpel blade. Given the location of the defect, shape of the defect and the proximity to free margins a rotation flap was deemed most appropriate. Using a sterile surgical marker, an appropriate rotation flap was drawn incorporating the defect and placing the expected incisions within the relaxed skin tension lines where possible. The area thus outlined was incised deep to adipose tissue with a #15 scalpel blade. The skin margins were undermined to an appropriate distance in all directions utilizing iris scissors.
Cartilage Graft Text: The defect edges were debeveled with a #15 scalpel blade. Given the location of the defect, shape of the defect, the fact the defect involved a full thickness cartilage defect a cartilage graft was deemed most appropriate. An appropriate donor site was identified, cleansed, and anesthetized. The cartilage graft was then harvested and transferred to the recipient site, oriented appropriately and then sutured into place. The secondary defect was then repaired using a primary closure.
Afx Histology Text: there is a poorly differentiated spindled cell neoplasm composed of fascicles of atypical spindled and epithelioid cells, infiltrating and replacing papillary and reticular dermis. Mitotic activity is brisk, including atypical forms. The lesion is present on a sun-damaged skin.  This pattern supports the diagnosis of atypical fibrous xanthoma
Referred To Asc For Closure Text (Leave Blank If You Do Not Want): After obtaining clear surgical margins the patient was sent to an [facility] for surgical repair. The patient understands they will receive post-surgical care and follow-up from the [facility] physician.
Bcc Histology Text: Beneath an irregular epidermis, there are small nodular masses of basaloid neoplastic cells with nuclear pleomorphism attached to the basal layer and surrounded by a loose fibrous stroma and mild inflammation in the dermis. The findings are typical for a basal cell carcinoma.
Donor Site Anesthesia Type: same as repair anesthesia
Localized Dermabrasion Text: The patient was draped in routine manner. Localized dermabrasion using 3 x 17 mm wire brush was performed in routine manner to papillary dermis. This spot dermabrasion is being performed to complete skin cancer reconstruction. It also will eliminate the other sun damaged precancerous cells that are known to be part of the regional effect of a lifetime's worth of sun exposure. This localized dermabrasion is therapeutic and should not be considered cosmetic in any regard.
V-Y Plasty Text: The defect edges were debeveled with a #15 scalpel blade. Given the location of the defect, shape of the defect and the proximity to free margins an V-Y advancement flap was deemed most appropriate. Using a sterile surgical marker, an appropriate advancement flap was drawn incorporating the defect and placing the expected incisions within the relaxed skin tension lines where possible. The area thus outlined was incised deep to adipose tissue with a #15 scalpel blade. The skin margins were undermined to an appropriate distance in all directions utilizing iris scissors.
Graft Donor Site Bandage (Optional-Leave Blank If You Don't Want In Note): Steri-strips and a pressure bandage were applied to the donor site.
Partial Purse String (Simple) Text: Given the location of the defect and the characteristics of the surrounding skin a simple purse string closure was deemed most appropriate. Undermining was performed circumfirentially around the surgical defect. A purse string suture was then placed and tightened. Wound tension only allowed a partial closure of the circular defect.
Scc Ka Subtype Histology Text: there is a cup-shaped exoendophytic epithelial neoplasm characterized by proliferations of keratinocytes with abundant eosinophilic glossy cytoplasm and nuclei with open chromatin in the papillary and upper reticular dermis. Multiple inclusions containing elastic material are seen within the cytoplasm. The features are of squamous cell carcinoma, keratoacanthoma type.
Epidermal Autograft Text: The defect edges were debeveled with a #15 scalpel blade. Given the location of the defect, shape of the defect and the proximity to free margins an epidermal autograft was deemed most appropriate. Using a sterile surgical marker, the primary defect shape was transferred to the donor site. The epidermal graft was then harvested. The skin graft was then placed in the primary defect and oriented appropriately.
Referring Physician (Optional): Sandy Massey
Area H Indication Text: Tumors in this location are included in Area H (eyelids, eyebrows, nose, lips, chin, ear, pre-auricular, post-auricular, temple, genitalia, hands, feet, ankles and areola). Tissue conservation is critical in these anatomic locations.
Referred To Oculoplastics For Closure Text (Leave Blank If You Do Not Want): After obtaining clear surgical margins the patient was sent to oculoplastics for surgical repair. The patient understands they will receive post-surgical care and follow-up from the referring physician's office.
Ear Star Wedge Flap Text: The defect edges were debeveled with a #15 blade scalpel. Given the location of the defect and the proximity to free margins (helical rim) an ear star wedge flap was deemed most appropriate. Using a sterile surgical marker, the appropriate flap was drawn incorporating the defect and placing the expected incisions between the helical rim and antihelix where possible. The area thus outlined was incised through and through with a #15 scalpel blade.
Cheiloplasty (Less Than 50%) Text: A decision was made to reconstruct the defect with a  cheiloplasty. The defect was undermined extensively. Additional obicularis oris muscle was excised with a 15 blade scalpel. The defect was converted into a full thickness wedge, of less than 50% of the vertical height of the lip, to facilite a better cosmetic result. Small vessels were then tied off with 5-0 monocyrl. The obicularis oris, superficial fascia, adipose and dermis were then reapproximated. After the deeper layers were approximated the epidermis was reapproximated with particular care given to realign the vermilion border.
Mixed Nodular And Infiltrative Bcc Histology Text: Irregular nests of pleomorphic, hyperchromatic basaloid cells with peripheral palisading infiltrate the tissue in a diffuse fashion in association with sclerotic stroma
Surgeon: Fatou Shah MD
Bcc Superficial Histology Text: Beneath an irregular epidermis, there are small nodular masses of basaloid neoplastic cells with nuclear pleomorphism attached to the basal layer and surrounded by a loose fibrous stroma and mild inflammation in the superficial dermis. The findings are typical for a superficial type of basal cell carcinoma.
Island Pedicle Flap-Requiring Vessel Identification Text: The defect edges were debeveled with a #15 scalpel blade. Given the location of the defect, shape of the defect and the proximity to free margins an island pedicle advancement flap was deemed most appropriate. Using a sterile surgical marker, an appropriate advancement flap was drawn, based on the axial vessel mentioned above, incorporating the defect, outlining the appropriate donor tissue and placing the expected incisions within the relaxed skin tension lines where possible. The area thus outlined was incised deep to adipose tissue with a #15 scalpel blade. The skin margins were undermined to an appropriate distance in all directions around the primary defect and laterally outward around the island pedicle utilizing iris scissors. There was minimal undermining beneath the pedicle flap.
Melolabial Transposition Flap Text: The defect edges were debeveled with a #15 scalpel blade. Given the location of the defect and the proximity to free margins a melolabial flap was deemed most appropriate. Using a sterile surgical marker, an appropriate melolabial transposition flap was drawn incorporating the defect. The area thus outlined was incised deep to adipose tissue with a #15 scalpel blade. The skin margins were undermined to an appropriate distance in all directions utilizing iris scissors.
No Residual Tumor Seen Histology Text: There were no malignant cells seen in the sections examined.
Location Indication Override (Is Already Calculated Based On Selected Body Location): Area H
S Plasty Text: Given the location and shape of the defect, and the orientation of relaxed skin tension lines, an S-plasty was deemed most appropriate for repair. Using a sterile surgical marker, the appropriate outline of the S-plasty was drawn, incorporating the defect and placing the expected incisions within the relaxed skin tension lines where possible. The area thus outlined was incised deep to adipose tissue with a #15 scalpel blade. The skin margins were undermined to an appropriate distance in all directions utilizing iris scissors. The skin flaps were advanced over the defect. The opposing margins were then approximated with interrupted buried subcutaneous sutures.
Bcc  Morpheaform/Sclerosing Histology Text: irregular nests of pleomorphic, hyperchromatic basaloid cells with peripheral palisading infiltrate the tissue in a diffuse fashion in association with a mucoid stroma and dense dermal sclerosis.  The tumor infiltrates in thin strands and single cells among the sclerotic collagen fibers in a pattern of morpheaform variant of basal cell carcinoma
Mohs Histo Method Verbiage: Each section was then chromacoded and processed in the Mohs lab using the Mohs protocol and submitted for frozen section.
Island Pedicle Flap With Canthal Suspension Text: The defect edges were debeveled with a #15 scalpel blade. Given the location of the defect, shape of the defect and the proximity to free margins an island pedicle advancement flap was deemed most appropriate. Using a sterile surgical marker, an appropriate advancement flap was drawn incorporating the defect, outlining the appropriate donor tissue and placing the expected incisions within the relaxed skin tension lines where possible. The area thus outlined was incised deep to adipose tissue with a #15 scalpel blade. The skin margins were undermined to an appropriate distance in all directions around the primary defect and laterally outward around the island pedicle utilizing iris scissors. There was minimal undermining beneath the pedicle flap. A suspension suture was placed in the canthal tendon to prevent tension and prevent ectropion.
Repair Performed By Another Provider Text (Leave Blank If You Do Not Want): After obtaining clear surgical margins the defect was repaired by another provider.
Detail Level: Detailed
Epidermal Closure Graft Donor Site (Optional): simple interrupted
Dressing (No Sutures): dry sterile dressing
Scc Poorly Differentiated Histology Text: there are strands and nests of pleomorphic cells present in the infiltrative pattern. Mitotic activity is brisk. There is vascular proliferation and acute and chronic inflammation in the dermis. The findings are those of a poorly differentiated squamous cell carcinoma.
Partial Purse String (Intermediate) Text: Given the location of the defect and the characteristics of the surrounding skin an intermediate purse string closure was deemed most appropriate. Undermining was performed circumfirentially around the surgical defect. A purse string suture was then placed and tightened. Wound tension only allowed a partial closure of the circular defect.
O-T Plasty Text: The defect edges were debeveled with a #15 scalpel blade. Given the location of the defect, shape of the defect and the proximity to free margins an O-T plasty was deemed most appropriate. Using a sterile surgical marker, an appropriate O-T plasty was drawn incorporating the defect and placing the expected incisions within the relaxed skin tension lines where possible. The area thus outlined was incised deep to adipose tissue with a #15 scalpel blade. The skin margins were undermined to an appropriate distance in all directions utilizing iris scissors.
Burow's Advancement Flap Text: The defect edges were debeveled with a #15 scalpel blade. Given the location of the defect and the proximity to free margins a Burow's advancement flap was deemed most appropriate. Using a sterile surgical marker, the appropriate advancement flap was drawn incorporating the defect and placing the expected incisions within the relaxed skin tension lines where possible. The area thus outlined was incised deep to adipose tissue with a #15 scalpel blade. The skin margins were undermined to an appropriate distance in all directions utilizing iris scissors.
Mohs Rapid Report Verbiage: The area of clinically evident tumor was marked with skin marking ink and appropriately hatched. The initial incision was made following the Mohs approach through the skin. The specimen was taken to the lab, divided into the necessary number of pieces, chromacoded and processed according to the Mohs protocol. This was repeated in successive stages until a tumor free defect was achieved.
Manual Repair Warning Statement: We plan on removing the manually selected variable below in favor of our much easier automatic structured text blocks found in the previous tab. We decided to do this to help make the flow better and give you the full power of structured data. Manual selection is never going to be ideal in our platform and I would encourage you to avoid using manual selection from this point on, especially since I will be sunsetting this feature. It is important that you do one of two things with the customized text below. First, you can save all of the text in a word file so you can have it for future reference. Second, transfer the text to the appropriate area in the Library tab. Lastly, if there is a flap or graft type which we do not have you need to let us know right away so I can add it in before the variable is hidden. No need to panic, we plan to give you roughly 6 months to make the change.
Consent (Ear)/Introductory Paragraph: The rationale for Mohs was explained to the patient and consent was obtained. The risks, benefits and alternatives to therapy were discussed in detail. Specifically, the risks of ear deformity, infection, scarring, bleeding, prolonged wound healing, incomplete removal, allergy to anesthesia, nerve injury and recurrence were addressed. Prior to the procedure, the treatment site was clearly identified and confirmed by the patient. All components of Universal Protocol/PAUSE Rule completed.
A-T Advancement Flap Text: The defect edges were debeveled with a #15 scalpel blade. Given the location of the defect, shape of the defect and the proximity to free margins an A-T advancement flap was deemed most appropriate. Using a sterile surgical marker, an appropriate advancement flap was drawn incorporating the defect and placing the expected incisions within the relaxed skin tension lines where possible. The area thus outlined was incised deep to adipose tissue with a #15 scalpel blade. The skin margins were undermined to an appropriate distance in all directions utilizing iris scissors.
Surgeon Performing Repair (Optional): 
Secondary Intention Text (Leave Blank If You Do Not Want): The defect will heal with secondary intention.
Aggressive Histology Indication Override: aggressive pathology
Closure 2 Information: This tab is for additional flaps and grafts, including complex repair and grafts and complex repair and flaps. You can also specify a different location for the additional defect, if the location is the same you do not need to select a new one. We will insert the automated text for the repair you select below just as we do for solitary flaps and grafts. Please note that at this time if you select a location with a different insurance zone you will need to override the ICD10 and CPT if appropriate.
Complex Repair And Flap Additional Text (Will Appearing After The Standard Complex Repair Text): The complex repair was not sufficient to completely close the primary defect. The remaining additional defect was repaired with the flap mentioned below.
Cheek Interpolation Flap Text: A decision was made to reconstruct the defect utilizing an interpolation axial flap and a staged reconstruction. A telfa template was made of the defect. This telfa template was then used to outline the Cheek Interpolation flap. The donor area for the pedicle flap was then injected with anesthesia. The flap was excised through the skin and subcutaneous tissue down to the layer of the underlying musculature. The interpolation flap was carefully excised within this deep plane to maintain its blood supply. The edges of the donor site were undermined. The donor site was closed in a primary fashion. The pedicle was then rotated into position and sutured. Once the tube was sutured into place, adequate blood supply was confirmed with blanching and refill. The pedicle was then wrapped with xeroform gauze and dressed appropriately with a telfa and gauze bandage to ensure continued blood supply and protect the attached pedicle.
Alternatives Discussed Intro (Do Not Add Period): I discussed alternative treatments to Mohs surgery and specifically discussed the risks and benefits of
Star Wedge Flap Text: The defect edges were debeveled with a #15 scalpel blade. Given the location of the defect, shape of the defect and the proximity to free margins a star wedge flap was deemed most appropriate. Using a sterile surgical marker, an appropriate rotation flap was drawn incorporating the defect and placing the expected incisions within the relaxed skin tension lines where possible. The area thus outlined was incised deep to adipose tissue with a #15 scalpel blade. The skin margins were undermined to an appropriate distance in all directions utilizing iris scissors.
Subsequent Stages Histo Method Verbiage: Using a similar technique to that described above, a thin layer of tissue was removed from all areas where tumor was visible on the previous stage. The tissue was again oriented, mapped, dyed, and processed as above.
Tissue Cultured Epidermal Autograft Text: The defect edges were debeveled with a #15 scalpel blade. Given the location of the defect, shape of the defect and the proximity to free margins a tissue cultured epidermal autograft was deemed most appropriate. The graft was then trimmed to fit the size of the defect. The graft was then placed in the primary defect and oriented appropriately.
Double Island Pedicle Flap Text: The defect edges were debeveled with a #15 scalpel blade. Given the location of the defect, shape of the defect and the proximity to free margins a double island pedicle advancement flap was deemed most appropriate. Using a sterile surgical marker, an appropriate advancement flap was drawn incorporating the defect, outlining the appropriate donor tissue and placing the expected incisions within the relaxed skin tension lines where possible. The area thus outlined was incised deep to adipose tissue with a #15 scalpel blade. The skin margins were undermined to an appropriate distance in all directions around the primary defect and laterally outward around the island pedicle utilizing iris scissors. There was minimal undermining beneath the pedicle flap.

## 2018-03-20 NOTE — PROCEDURE: ADDITIONAL PATHOLOGY SPECIMEN
Biopsy Type: H and E (Permanent Final Margin)
X Size Of Lesion In Cm (Optional): 0
Notification Instructions: Patient will be notified of biopsy results. However, patient instructed to call the office if not contacted within 2 weeks.
Billing Type: [employer]
Detail Level: Detailed
Lab Facility: [id number]
Lab: [facility]

## 2018-03-20 NOTE — PROCEDURE: REPAIR NOTE
Closure 3 Information: This tab is for additional flaps and grafts above and beyond our usual structured repairs. Please note if you enter information here it will not currently bill and you will need to add the billing information manually.
Additional Epidermal Closure (Optional): running
Skin Substitute Text: The defect edges were debeveled with a #15 scalpel blade. Given the location of the defect, shape of the defect and the proximity to free margins a skin substitute graft was deemed most appropriate. The graft material was trimmed to fit the size of the defect. The graft was then placed in the primary defect and oriented appropriately.
Estimated Blood Loss (Cc): minimal
O-T Plasty Text: The defect edges were debeveled with a #15 scalpel blade. Given the location of the defect, shape of the defect and the proximity to free margins an O-T plasty was deemed most appropriate. Using a sterile surgical marker, an appropriate O-T plasty was drawn incorporating the defect and placing the expected incisions within the relaxed skin tension lines where possible. The area thus outlined was incised deep to adipose tissue with a #15 scalpel blade. The skin margins were undermined to an appropriate distance in all directions utilizing iris scissors.
Secondary Defect Length (In Cm): 0
Melolabial Transposition Flap Text: The defect edges were debeveled with a #15 scalpel blade. Given the location of the defect and the proximity to free margins a melolabial flap was deemed most appropriate. Using a sterile surgical marker, an appropriate melolabial transposition flap was drawn incorporating the defect. The area thus outlined was incised deep to adipose tissue with a #15 scalpel blade. The skin margins were undermined to an appropriate distance in all directions utilizing iris scissors.
Bilobed Transposition Flap Text: The defect edges were debeveled with a #15 scalpel blade. Given the location of the defect and the proximity to free margins a bilobed transposition flap was deemed most appropriate. Using a sterile surgical marker, an appropriate bilobe flap drawn around the defect. The area thus outlined was incised deep to adipose tissue with a #15 scalpel blade. The skin margins were undermined to an appropriate distance in all directions utilizing iris scissors.
Hatchet Flap Text: The defect edges were debeveled with a #15 scalpel blade. Given the location of the defect, shape of the defect and the proximity to free margins a hatchet flap was deemed most appropriate. Using a sterile surgical marker, an appropriate hatchet flap was drawn incorporating the defect and placing the expected incisions within the relaxed skin tension lines where possible. The area thus outlined was incised deep to adipose tissue with a #15 scalpel blade. The skin margins were undermined to an appropriate distance in all directions utilizing iris scissors.
Bilobed Flap Text: The defect edges were debeveled with a #15 scalpel blade. Given the location of the defect and the proximity to free margins a bilobe flap was deemed most appropriate. Using a sterile surgical marker, an appropriate bilobe flap drawn around the defect. The area thus outlined was incised deep to adipose tissue with a #15 scalpel blade. The skin margins were undermined to an appropriate distance in all directions utilizing iris scissors.
Referred To Asc For Closure Text (Leave Blank If You Do Not Want): After obtaining clear surgical margins the patient was sent to an [facility] for surgical repair. The patient understands they will receive post-surgical care and follow-up from the [facility] physician.
Manual Repair Warning Statement: We plan on removing the manually selected variable below in favor of our much easier automatic structured text blocks found in the previous tab. We decided to do this to help make the flow better and give you the full power of structured data. Manual selection is never going to be ideal in our platform and I would encourage you to avoid using manual selection from this point on, especially since I will be sunsetting this feature. It is important that you do one of two things with the customized text below. First, you can save all of the text in a word file so you can have it for future reference. Second, transfer the text to the appropriate area in the Library tab. Lastly, if there is a flap or graft type which we do not have you need to let us know right away so I can add it in before the variable is hidden. No need to panic, we plan to give you roughly 6 months to make the change.
Referred To Mid-Level For Closure Text (Leave Blank If You Do Not Want): After obtaining clear surgical margins the patient was sent to a mid-level provider for surgical repair. The patient understands they will receive post-surgical care and follow-up from the mid-level provider.
Ftsg Text: The defect edges were debeveled with a #15 scalpel blade. Given the location of the defect, shape of the defect and the proximity to free margins a full thickness skin graft was deemed most appropriate. Using a sterile surgical marker, the primary defect shape was transferred to the donor site. The area thus outlined was incised deep to adipose tissue with a #15 scalpel blade. The harvested graft was then trimmed of adipose tissue until only dermis and epidermis was left. The skin margins of the secondary defect were undermined to an appropriate distance in all directions utilizing iris scissors. The secondary defect was closed with interrupted buried subcutaneous sutures. The skin edges were then re-apposed with running  sutures. The skin graft was then placed in the primary defect and oriented appropriately.
O-L Flap Text: The defect edges were debeveled with a #15 scalpel blade. Given the location of the defect, shape of the defect and the proximity to free margins an O-L flap was deemed most appropriate. Using a sterile surgical marker, an appropriate advancement flap was drawn incorporating the defect and placing the expected incisions within the relaxed skin tension lines where possible. The area thus outlined was incised deep to adipose tissue with a #15 scalpel blade. The skin margins were undermined to an appropriate distance in all directions utilizing iris scissors.
Interpolation Flap Text: A decision was made to reconstruct the defect utilizing an interpolation axial flap and a staged reconstruction. A telfa template was made of the defect. This telfa template was then used to outline the interpolation flap. The donor area for the pedicle flap was then injected with anesthesia. The flap was excised through the skin and subcutaneous tissue down to the layer of the underlying musculature. The interpolation flap was carefully excised within this deep plane to maintain its blood supply. The edges of the donor site were undermined. The donor site was closed in a primary fashion. The pedicle was then rotated into position and sutured. Once the tube was sutured into place, adequate blood supply was confirmed with blanching and refill. The pedicle was then wrapped with xeroform gauze and dressed appropriately with a telfa and gauze bandage to ensure continued blood supply and protect the attached pedicle.
Tarsorrhaphy Performed?: No
Trilobed Flap Text: The defect edges were debeveled with a #15 scalpel blade. Given the location of the defect and the proximity to free margins a trilobed flap was deemed most appropriate. Using a sterile surgical marker, an appropriate trilobed flap drawn around the defect. The area thus outlined was incised deep to adipose tissue with a #15 scalpel blade. The skin margins were undermined to an appropriate distance in all directions utilizing iris scissors.
Muscle Hinge Flap Text: The defect edges were debeveled with a #15 scalpel blade. Given the size, depth and location of the defect and the proximity to free margins a muscle hinge flap was deemed most appropriate. Using a sterile surgical marker, an appropriate hinge flap was drawn incorporating the defect. The area thus outlined was incised with a #15 scalpel blade. The skin margins were undermined to an appropriate distance in all directions utilizing iris scissors.
Referring Provider (Optional): Amarilys Segura
Alar Island Pedicle Flap Text: The defect edges were debeveled with a #15 scalpel blade. Given the location of the defect, shape of the defect and the proximity to the alar rim an island pedicle advancement flap was deemed most appropriate. Using a sterile surgical marker, an appropriate advancement flap was drawn incorporating the defect, outlining the appropriate donor tissue and placing the expected incisions within the nasal ala running parallel to the alar rim. The area thus outlined was incised with a #15 scalpel blade. The skin margins were undermined minimally to an appropriate distance in all directions around the primary defect and laterally outward around the island pedicle utilizing iris scissors. There was minimal undermining beneath the pedicle flap.
Secondary Intention Text (Leave Blank If You Do Not Want): The defect will heal with secondary intention.
Cheek Interpolation Flap Text: A decision was made to reconstruct the defect utilizing an interpolation axial flap and a staged reconstruction. A telfa template was made of the defect. This telfa template was then used to outline the Cheek Interpolation flap. The donor area for the pedicle flap was then injected with anesthesia. The flap was excised through the skin and subcutaneous tissue down to the layer of the underlying musculature. The interpolation flap was carefully excised within this deep plane to maintain its blood supply. The edges of the donor site were undermined. The donor site was closed in a primary fashion. The pedicle was then rotated into position and sutured. Once the tube was sutured into place, adequate blood supply was confirmed with blanching and refill. The pedicle was then wrapped with xeroform gauze and dressed appropriately with a telfa and gauze bandage to ensure continued blood supply and protect the attached pedicle.
Epidermal Closure Graft Donor Site (Optional): simple interrupted
Posterior Auricular Interpolation Flap Division And Inset Text: Division and inset of the posterior auricular interpolation flap was performed to achieve optimal aesthetic result, restore normal anatomic appearance and avoid distortion of normal anatomy, expedite and facilitate wound healing, achieve optimal functional result and because linear closure either not possible or would produce suboptimal result. The patient was prepped and draped in the usual manner. The pedicle was infiltrated with local anesthesia. The pedicle was sectioned with a #15 blade. The pedicle was de-bulked and trimmed to match the shape of the defect. Hemostasis was achieved. The flap donor site and free margin of the flap were secured with deep buried sutures and the wound edges were re-approximated.
Repair Performed By Another Provider Text (Leave Blank If You Do Not Want): After obtaining clear surgical margins the defect was repaired by another provider.
Mucosal Advancement Flap Text: Given the location of the defect, shape of the defect and the proximity to free margins a mucosal advancement flap was deemed most appropriate. Incisions were made with a 15 blade scalpel in the appropriate fashion along the cutaneous vermilion border and the mucosal lip. The remaining actinically damaged mucosal tissue was excised. The mucosal advancement flap was then elevated to the gingival sulcus with care taken to preserve the neurovascular structures and advanced into the primary defect. Care was taken to ensure that precise realignment of the vermilion border was achieved.
Cheiloplasty (Less Than 50%) Text: A decision was made to reconstruct the defect with a  cheiloplasty. The defect was undermined extensively. Additional obicularis oris muscle was excised with a 15 blade scalpel. The defect was converted into a full thickness wedge, of less than 50% of the vertical height of the lip, to facilite a better cosmetic result. Small vessels were then tied off with 5-0 monocyrl. The obicularis oris, superficial fascia, adipose and dermis were then reapproximated. After the deeper layers were approximated the epidermis was reapproximated with particular care given to realign the vermilion border.
Primary Defect Width (In Cm): 3
Island Pedicle Flap With Canthal Suspension Text: The defect edges were debeveled with a #15 scalpel blade. Given the location of the defect, shape of the defect and the proximity to free margins an island pedicle advancement flap was deemed most appropriate. Using a sterile surgical marker, an appropriate advancement flap was drawn incorporating the defect, outlining the appropriate donor tissue and placing the expected incisions within the relaxed skin tension lines where possible. The area thus outlined was incised deep to adipose tissue with a #15 scalpel blade. The skin margins were undermined to an appropriate distance in all directions around the primary defect and laterally outward around the island pedicle utilizing iris scissors. There was minimal undermining beneath the pedicle flap. A suspension suture was placed in the canthal tendon to prevent tension and prevent ectropion.
Epidermal Autograft Text: The defect edges were debeveled with a #15 scalpel blade. Given the location of the defect, shape of the defect and the proximity to free margins an epidermal autograft was deemed most appropriate. Using a sterile surgical marker, the primary defect shape was transferred to the donor site. The epidermal graft was then harvested. The skin graft was then placed in the primary defect and oriented appropriately.
Xenograft Text: The defect edges were debeveled with a #15 scalpel blade. Given the location of the defect, shape of the defect and the proximity to free margins a xenograft was deemed most appropriate. The graft was then trimmed to fit the size of the defect. The graft was then placed in the primary defect and oriented appropriately.
Lazy S Intermediate Repair Preamble Text (Leave Blank If You Do Not Want): Undermining was performed with blunt dissection.
Wound Care: Vaseline
Ear Star Wedge Flap Text: The defect edges were debeveled with a #15 blade scalpel. Given the location of the defect and the proximity to free margins (helical rim) an ear star wedge flap was deemed most appropriate. Using a sterile surgical marker, the appropriate flap was drawn incorporating the defect and placing the expected incisions between the helical rim and antihelix where possible. The area thus outlined was incised through and through with a #15 scalpel blade.
Advancement Flap (Single) Text: The defect edges were debeveled with a #15 scalpel blade. Given the location of the defect and the proximity to free margins a single advancement flap was deemed most appropriate. Using a sterile surgical marker, an appropriate advancement flap was drawn incorporating the defect and placing the expected incisions within the relaxed skin tension lines where possible. The area thus outlined was incised deep to adipose tissue with a #15 scalpel blade. The skin margins were undermined to an appropriate distance in all directions utilizing iris scissors.
Star Wedge Flap Text: The defect edges were debeveled with a #15 scalpel blade. Given the location of the defect, shape of the defect and the proximity to free margins a star wedge flap was deemed most appropriate. Using a sterile surgical marker, an appropriate rotation flap was drawn incorporating the defect and placing the expected incisions within the relaxed skin tension lines where possible. The area thus outlined was incised deep to adipose tissue with a #15 scalpel blade. The skin margins were undermined to an appropriate distance in all directions utilizing iris scissors.
Partial Purse String (Intermediate) Text: Given the location of the defect and the characteristics of the surrounding skin an intermediate purse string closure was deemed most appropriate. Undermining was performed circumfirentially around the surgical defect. A purse string suture was then placed and tightened. Wound tension only allowed a partial closure of the circular defect.
Paramedian Forehead Flap Text: A decision was made to reconstruct the defect utilizing an interpolation axial flap and a staged reconstruction. A telfa template was made of the defect. This telfa template was then used to outline the paramedian forehead pedicle flap. The donor area for the pedicle flap was then injected with anesthesia. The flap was excised through the skin and subcutaneous tissue down to the layer of the underlying musculature. The pedicle flap was carefully excised within this deep plane to maintain its blood supply. The edges of the donor site were undermined. The donor site was closed in a primary fashion. The pedicle was then rotated into position and sutured. Once the tube was sutured into place, adequate blood supply was confirmed with blanching and refill. The pedicle was then wrapped with xeroform gauze and dressed appropriately with a telfa and gauze bandage to ensure continued blood supply and protect the attached pedicle.
Cartilage Graft Text: The defect edges were debeveled with a #15 scalpel blade. Given the location of the defect, shape of the defect, the fact the defect involved a full thickness cartilage defect a cartilage graft was deemed most appropriate. An appropriate donor site was identified, cleansed, and anesthetized. The cartilage graft was then harvested and transferred to the recipient site, oriented appropriately and then sutured into place. The secondary defect was then repaired using a primary closure.
H Plasty Text: Given the location of the defect, shape of the defect and the proximity to free margins a H-plasty was deemed most appropriate for repair. Using a sterile surgical marker, the appropriate advancement arms of the H-plasty were drawn incorporating the defect and placing the expected incisions within the relaxed skin tension lines where possible. The area thus outlined was incised deep to adipose tissue with a #15 scalpel blade. The skin margins were undermined to an appropriate distance in all directions utilizing iris scissors. The opposing advancement arms were then advanced into place in opposite direction and anchored with interrupted buried subcutaneous sutures.
Modified Advancement Flap Text: The defect edges were debeveled with a #15 scalpel blade. Given the location of the defect, shape of the defect and the proximity to free margins a modified advancement flap was deemed most appropriate. Using a sterile surgical marker, an appropriate advancement flap was drawn incorporating the defect and placing the expected incisions within the relaxed skin tension lines where possible. The area thus outlined was incised deep to adipose tissue with a #15 scalpel blade. The skin margins were undermined to an appropriate distance in all directions utilizing iris scissors.
Tarsorrhaphy Text: A tarsorrhaphy was performed using Frost sutures.
Referred To Plastics For Closure Text (Leave Blank If You Do Not Want): After obtaining clear surgical margins the patient was sent to plastics for surgical repair. The patient understands they will receive post-surgical care and follow-up from the referring physician's office.
Dermal Autograft Text: The defect edges were debeveled with a #15 scalpel blade. Given the location of the defect, shape of the defect and the proximity to free margins a dermal autograft was deemed most appropriate. Using a sterile surgical marker, the primary defect shape was transferred to the donor site. The area thus outlined was incised deep to adipose tissue with a #15 scalpel blade. The harvested graft was then trimmed of adipose and epidermal tissue until only dermis was left. The skin graft was then placed in the primary defect and oriented appropriately.
Rhombic Flap Text: The defect edges were debeveled with a #15 scalpel blade. Given the location of the defect and the proximity to free margins a rhombic flap was deemed most appropriate. Using a sterile surgical marker, an appropriate rhombic flap was drawn incorporating the defect. The area thus outlined was incised deep to adipose tissue with a #15 scalpel blade. The skin margins were undermined to an appropriate distance in all directions utilizing iris scissors.
Transposition Flap Text: The defect edges were debeveled with a #15 scalpel blade. Given the location of the defect and the proximity to free margins a transposition flap was deemed most appropriate. Using a sterile surgical marker, an appropriate transposition flap was drawn incorporating the defect. The area thus outlined was incised deep to adipose tissue with a #15 scalpel blade. The skin margins were undermined to an appropriate distance in all directions utilizing iris scissors.
Closure 4 Information: This tab is for additional flaps and grafts above and beyond our usual structured repairs.  Please note if you enter information here it will not currently bill and you will need to add the billing information manually.
Cheek-To-Nose Interpolation Flap Text: A decision was made to reconstruct the defect utilizing an interpolation axial flap and a staged reconstruction. A telfa template was made of the defect. This telfa template was then used to outline the Cheek-To-Nose Interpolation flap. The donor area for the pedicle flap was then injected with anesthesia. The flap was excised through the skin and subcutaneous tissue down to the layer of the underlying musculature. The interpolation flap was carefully excised within this deep plane to maintain its blood supply. The edges of the donor site were undermined. The donor site was closed in a primary fashion. The pedicle was then rotated into position and sutured. Once the tube was sutured into place, adequate blood supply was confirmed with blanching and refill. The pedicle was then wrapped with xeroform gauze and dressed appropriately with a telfa and gauze bandage to ensure continued blood supply and protect the attached pedicle.
Epidermal Sutures: 4-0 Prolene
V-Y Plasty Text: The defect edges were debeveled with a #15 scalpel blade. Given the location of the defect, shape of the defect and the proximity to free margins an V-Y advancement flap was deemed most appropriate. Using a sterile surgical marker, an appropriate advancement flap was drawn incorporating the defect and placing the expected incisions within the relaxed skin tension lines where possible. The area thus outlined was incised deep to adipose tissue with a #15 scalpel blade. The skin margins were undermined to an appropriate distance in all directions utilizing iris scissors.
Anesthesia Volume In Cc: 6
Repair Type: Complex Repair
Split-Thickness Skin Graft Text: The defect edges were debeveled with a #15 scalpel blade. Given the location of the defect, shape of the defect and the proximity to free margins a split thickness skin graft was deemed most appropriate. Using a sterile surgical marker, the primary defect shape was transferred to the donor site. The split thickness graft was then harvested. The skin graft was then placed in the primary defect and oriented appropriately.
Posterior Auricular Interpolation Flap Text: A decision was made to reconstruct the defect utilizing an interpolation axial flap and a staged reconstruction. A telfa template was made of the defect. This telfa template was then used to outline the posterior auricular interpolation flap. The donor area for the pedicle flap was then injected with anesthesia. The flap was excised through the skin and subcutaneous tissue down to the layer of the underlying musculature. The pedicle flap was carefully excised within this deep plane to maintain its blood supply. The edges of the donor site were undermined. The donor site was closed in a primary fashion. The pedicle was then rotated into position and sutured. Once the tube was sutured into place, adequate blood supply was confirmed with blanching and refill. The pedicle was then wrapped with xeroform gauze and dressed appropriately with a telfa and gauze bandage to ensure continued blood supply and protect the attached pedicle.
Anesthesia Type: 1% lidocaine with epinephrine
O-Z Plasty Text: The defect edges were debeveled with a #15 scalpel blade. Given the location of the defect, shape of the defect and the proximity to free margins an O-Z plasty (double transposition flap) was deemed most appropriate. Using a sterile surgical marker, the appropriate transposition flaps were drawn incorporating the defect and placing the expected incisions within the relaxed skin tension lines where possible. The area thus outlined was incised deep to adipose tissue with a #15 scalpel blade. The skin margins were undermined to an appropriate distance in all directions utilizing iris scissors. Hemostasis was achieved with electrocautery. The flaps were then transposed into place, one clockwise and the other counterclockwise, and anchored with interrupted buried subcutaneous sutures.
Island Pedicle Flap Text: The defect edges were debeveled with a #15 scalpel blade. Given the location of the defect, shape of the defect and the proximity to free margins an island pedicle advancement flap was deemed most appropriate. Using a sterile surgical marker, an appropriate advancement flap was drawn incorporating the defect, outlining the appropriate donor tissue and placing the expected incisions within the relaxed skin tension lines where possible. The area thus outlined was incised deep to adipose tissue with a #15 scalpel blade. The skin margins were undermined to an appropriate distance in all directions around the primary defect and laterally outward around the island pedicle utilizing iris scissors. There was minimal undermining beneath the pedicle flap.
Localized Dermabrasion Text: The patient was draped in routine manner. Localized dermabrasion using 3 x 17 mm wire brush was performed in routine manner to papillary dermis. This spot dermabrasion is being performed to complete skin cancer reconstruction. It also will eliminate the other sun damaged precancerous cells that are known to be part of the regional effect of a lifetime's worth of sun exposure. This localized dermabrasion is therapeutic and should not be considered cosmetic in any regard.
Deep Sutures: 4-0 Vicryl
A-T Advancement Flap Text: The defect edges were debeveled with a #15 scalpel blade. Given the location of the defect, shape of the defect and the proximity to free margins an A-T advancement flap was deemed most appropriate. Using a sterile surgical marker, an appropriate advancement flap was drawn incorporating the defect and placing the expected incisions within the relaxed skin tension lines where possible. The area thus outlined was incised deep to adipose tissue with a #15 scalpel blade. The skin margins were undermined to an appropriate distance in all directions utilizing iris scissors.
Burow's Advancement Flap Text: The defect edges were debeveled with a #15 scalpel blade. Given the location of the defect and the proximity to free margins a Burow's advancement flap was deemed most appropriate. Using a sterile surgical marker, the appropriate advancement flap was drawn incorporating the defect and placing the expected incisions within the relaxed skin tension lines where possible. The area thus outlined was incised deep to adipose tissue with a #15 scalpel blade. The skin margins were undermined to an appropriate distance in all directions utilizing iris scissors.
Helical Rim Advancement Flap Text: The defect edges were debeveled with a #15 blade scalpel. Given the location of the defect and the proximity to free margins (helical rim) a double helical rim advancement flap was deemed most appropriate. Using a sterile surgical marker, the appropriate advancement flaps were drawn incorporating the defect and placing the expected incisions between the helical rim and antihelix where possible. The area thus outlined was incised through and through with a #15 scalpel blade. With a skin hook and iris scissors, the flaps were gently and sharply undermined and freed up.
Referred To Otolaryngology For Closure Text (Leave Blank If You Do Not Want): After obtaining clear surgical margins the patient was sent to otolaryngology for surgical repair. The patient understands they will receive post-surgical care and follow-up from the referring physician's office.
Hemostasis: Electrocautery
Cheek Interpolation Flap Division And Inset Text: Division and inset of the cheek interpolation flap was performed to achieve optimal aesthetic result, restore normal anatomic appearance and avoid distortion of normal anatomy, expedite and facilitate wound healing, achieve optimal functional result and because linear closure either not possible or would produce suboptimal result. The patient was prepped and draped in the usual manner. The pedicle was infiltrated with local anesthesia. The pedicle was sectioned with a #15 blade. The pedicle was de-bulked and trimmed to match the shape of the defect. Hemostasis was achieved. The flap donor site and free margin of the flap were secured with deep buried sutures and the wound edges were re-approximated.
Lazy S Complex Repair Preamble Text (Leave Blank If You Do Not Want): Extensive wide undermining was performed.
Graft Donor Site Bandage (Optional-Leave Blank If You Don't Want In Note): Steri-strips and a pressure bandage were applied to the donor site.
Post-Care Instructions: I reviewed with the patient in detail post-care instructions. Patient is not to engage in any heavy lifting, exercise, or swimming for the next 14 days. Should the patient develop any fevers, chills, bleeding, severe pain patient will contact the office immediately.
Advancement-Rotation Flap Text: The defect edges were debeveled with a #15 scalpel blade. Given the location of the defect, shape of the defect and the proximity to free margins an advancement-rotation flap was deemed most appropriate. Using a sterile surgical marker, an appropriate flap was drawn incorporating the defect and placing the expected incisions within the relaxed skin tension lines where possible. The area thus outlined was incised deep to adipose tissue with a #15 scalpel blade. The skin margins were undermined to an appropriate distance in all directions utilizing iris scissors.
Detail Level: Detailed
Rotation Flap Text: The defect edges were debeveled with a #15 scalpel blade. Given the location of the defect, shape of the defect and the proximity to free margins a rotation flap was deemed most appropriate. Using a sterile surgical marker, an appropriate rotation flap was drawn incorporating the defect and placing the expected incisions within the relaxed skin tension lines where possible. The area thus outlined was incised deep to adipose tissue with a #15 scalpel blade. The skin margins were undermined to an appropriate distance in all directions utilizing iris scissors.
W Plasty Text: The lesion was extirpated to the level of the fat with a #15 scalpel blade. Given the location of the defect, shape of the defect and the proximity to free margins a W-plasty was deemed most appropriate for repair. Using a sterile surgical marker, the appropriate transposition arms of the W-plasty were drawn incorporating the defect and placing the expected incisions within the relaxed skin tension lines where possible. The area thus outlined was incised deep to adipose tissue with a #15 scalpel blade. The skin margins were undermined to an appropriate distance in all directions utilizing iris scissors. The opposing transposition arms were then transposed into place in opposite direction and anchored with interrupted buried subcutaneous sutures.
Dorsal Nasal Flap Text: The defect edges were debeveled with a #15 scalpel blade. Given the location of the defect and the proximity to free margins a dorsal nasal flap was deemed most appropriate. Using a sterile surgical marker, an appropriate dorsal nasal flap was drawn around the defect. The area thus outlined was incised deep to adipose tissue with a #15 scalpel blade. The skin margins were undermined to an appropriate distance in all directions utilizing iris scissors.
Partial Purse String (Simple) Text: Given the location of the defect and the characteristics of the surrounding skin a simple purse string closure was deemed most appropriate. Undermining was performed circumfirentially around the surgical defect. A purse string suture was then placed and tightened. Wound tension only allowed a partial closure of the circular defect.
Advancement Flap (Double) Text: The defect edges were debeveled with a #15 scalpel blade. Given the location of the defect and the proximity to free margins a double advancement flap was deemed most appropriate. Using a sterile surgical marker, the appropriate advancement flaps were drawn incorporating the defect and placing the expected incisions within the relaxed skin tension lines where possible. The area thus outlined was incised deep to adipose tissue with a #15 scalpel blade. The skin margins were undermined to an appropriate distance in all directions utilizing iris scissors.
Paramedian Forehead Flap Division And Inset Text: Division and inset of the paramedian forehead flap was performed to achieve optimal aesthetic result, restore normal anatomic appearance and avoid distortion of normal anatomy, expedite and facilitate wound healing, achieve optimal functional result and because linear closure either not possible or would produce suboptimal result. The patient was prepped and draped in the usual manner. The pedicle was infiltrated with local anesthesia. The pedicle was sectioned with a #15 blade. The pedicle was de-bulked and trimmed to match the shape of the defect. Hemostasis was achieved. The flap donor site and free margin of the flap were secured with deep buried sutures and the wound edges were re-approximated.
Cheek To Nose Interpolation Flap Division And Inset Text: Division and inset of the cheek to nose interpolation flap was performed to achieve optimal aesthetic result, restore normal anatomic appearance and avoid distortion of normal anatomy, expedite and facilitate wound healing, achieve optimal functional result and because linear closure either not possible or would produce suboptimal result. The patient was prepped and draped in the usual manner. The pedicle was infiltrated with local anesthesia. The pedicle was sectioned with a #15 blade. The pedicle was de-bulked and trimmed to match the shape of the defect. Hemostasis was achieved. The flap donor site and free margin of the flap were secured with deep buried sutures and the wound edges were re-approximated.
Primary Defect Length (In Cm): 3.5
Crescentic Advancement Flap Text: The defect edges were debeveled with a #15 scalpel blade. Given the location of the defect and the proximity to free margins a crescentic advancement flap was deemed most appropriate. Using a sterile surgical marker, the appropriate advancement flap was drawn incorporating the defect and placing the expected incisions within the relaxed skin tension lines where possible. The area thus outlined was incised deep to adipose tissue with a #15 scalpel blade. The skin margins were undermined to an appropriate distance in all directions utilizing iris scissors.
Mastoid Interpolation Flap Text: A decision was made to reconstruct the defect utilizing an interpolation axial flap and a staged reconstruction. A telfa template was made of the defect. This telfa template was then used to outline the mastoid interpolation flap. The donor area for the pedicle flap was then injected with anesthesia. The flap was excised through the skin and subcutaneous tissue down to the layer of the underlying musculature. The pedicle flap was carefully excised within this deep plane to maintain its blood supply. The edges of the donor site were undermined. The donor site was closed in a primary fashion. The pedicle was then rotated into position and sutured. Once the tube was sutured into place, adequate blood supply was confirmed with blanching and refill. The pedicle was then wrapped with xeroform gauze and dressed appropriately with a telfa and gauze bandage to ensure continued blood supply and protect the attached pedicle.
Bi-Rhombic Flap Text: The defect edges were debeveled with a #15 scalpel blade. Given the location of the defect and the proximity to free margins a bi-rhombic flap was deemed most appropriate. Using a sterile surgical marker, an appropriate rhombic flap was drawn incorporating the defect. The area thus outlined was incised deep to adipose tissue with a #15 scalpel blade. The skin margins were undermined to an appropriate distance in all directions utilizing iris scissors.
Complex Repair And Flap Additional Text (Will Appearing After The Standard Complex Repair Text): The complex repair was not sufficient to completely close the primary defect. The remaining additional defect was repaired with the flap mentioned below.
Suture Removal: 14 days
Consent: The rationale for the repair was explained to the patient and consent was obtained. The risks, benefits and alternatives to therapy were discussed in detail. Specifically, the risks of infection, scarring, bleeding, prolonged wound healing, incomplete removal, allergy to anesthesia, nerve injury and recurrence were addressed. Prior to the procedure, the treatment site was clearly identified and confirmed by the patient. All components of Universal Protocol/PAUSE Rule completed.
Dressing: pressure dressing with telfa
S Plasty Text: Given the location and shape of the defect, and the orientation of relaxed skin tension lines, an S-plasty was deemed most appropriate for repair. Using a sterile surgical marker, the appropriate outline of the S-plasty was drawn, incorporating the defect and placing the expected incisions within the relaxed skin tension lines where possible. The area thus outlined was incised deep to adipose tissue with a #15 scalpel blade. The skin margins were undermined to an appropriate distance in all directions utilizing iris scissors. The skin flaps were advanced over the defect. The opposing margins were then approximated with interrupted buried subcutaneous sutures.
Cheiloplasty (Complex) Text: A decision was made to reconstruct the defect with a  cheiloplasty. The defect was undermined extensively. Additional obicularis oris muscle was excised with a 15 blade scalpel. The defect was converted into a full thickness wedge to facilite a better cosmetic result. Small vessels were then tied off with 5-0 monocyrl. The obicularis oris, superficial fascia, adipose and dermis were then reapproximated. After the deeper layers were approximated the epidermis was reapproximated with particular care given to realign the vermilion border.
Full Thickness Lip Wedge Repair (Flap) Text: Given the location of the defect and the proximity to free margins a full thickness wedge repair was deemed most appropriate. Using a sterile surgical marker, the appropriate repair was drawn incorporating the defect and placing the expected incisions perpendicular to the vermilion border. The vermilion border was also meticulously outlined to ensure appropriate reapproximation during the repair. The area thus outlined was incised through and through with a #15 scalpel blade. The muscularis and dermis were reaproximated with deep sutures following hemostasis. Care was taken to realign the vermilion border before proceeding with the superficial closure. Once the vermilion was realigned the superfical and mucosal closure was finished.
Z Plasty Text: The lesion was extirpated to the level of the fat with a #15 scalpel blade. Given the location of the defect, shape of the defect and the proximity to free margins a Z-plasty was deemed most appropriate for repair. Using a sterile surgical marker, the appropriate transposition arms of the Z-plasty were drawn incorporating the defect and placing the expected incisions within the relaxed skin tension lines where possible. The area thus outlined was incised deep to adipose tissue with a #15 scalpel blade. The skin margins were undermined to an appropriate distance in all directions utilizing iris scissors. The opposing transposition arms were then transposed into place in opposite direction and anchored with interrupted buried subcutaneous sutures.
Spiral Flap Text: The defect edges were debeveled with a #15 scalpel blade. Given the location of the defect, shape of the defect and the proximity to free margins a spiral flap was deemed most appropriate. Using a sterile surgical marker, an appropriate rotation flap was drawn incorporating the defect and placing the expected incisions within the relaxed skin tension lines where possible. The area thus outlined was incised deep to adipose tissue with a #15 scalpel blade. The skin margins were undermined to an appropriate distance in all directions utilizing iris scissors.
O-T Advancement Flap Text: The defect edges were debeveled with a #15 scalpel blade. Given the location of the defect, shape of the defect and the proximity to free margins an O-T advancement flap was deemed most appropriate. Using a sterile surgical marker, an appropriate advancement flap was drawn incorporating the defect and placing the expected incisions within the relaxed skin tension lines where possible. The area thus outlined was incised deep to adipose tissue with a #15 scalpel blade. The skin margins were undermined to an appropriate distance in all directions utilizing iris scissors.
Purse String (Intermediate) Text: Given the location of the defect and the characteristics of the surrounding skin a purse string intermediate closure was deemed most appropriate. Undermining was performed circumfirentially around the surgical defect. A purse string suture was then placed and tightened.
Bilateral Helical Rim Advancement Flap Text: The defect edges were debeveled with a #15 blade scalpel. Given the location of the defect and the proximity to free margins (helical rim) a bilateral helical rim advancement flap was deemed most appropriate. Using a sterile surgical marker, the appropriate advancement flaps were drawn incorporating the defect and placing the expected incisions between the helical rim and antihelix where possible. The area thus outlined was incised through and through with a #15 scalpel blade. With a skin hook and iris scissors, the flaps were gently and sharply undermined and freed up.
Island Pedicle Flap-Requiring Vessel Identification Text: The defect edges were debeveled with a #15 scalpel blade. Given the location of the defect, shape of the defect and the proximity to free margins an island pedicle advancement flap was deemed most appropriate. Using a sterile surgical marker, an appropriate advancement flap was drawn, based on the axial vessel mentioned above, incorporating the defect, outlining the appropriate donor tissue and placing the expected incisions within the relaxed skin tension lines where possible. The area thus outlined was incised deep to adipose tissue with a #15 scalpel blade. The skin margins were undermined to an appropriate distance in all directions around the primary defect and laterally outward around the island pedicle utilizing iris scissors. There was minimal undermining beneath the pedicle flap.
V-Y Flap Text: The defect edges were debeveled with a #15 scalpel blade. Given the location of the defect, shape of the defect and the proximity to free margins a V-Y flap was deemed most appropriate. Using a sterile surgical marker, an appropriate advancement flap was drawn incorporating the defect and placing the expected incisions within the relaxed skin tension lines where possible. The area thus outlined was incised deep to adipose tissue with a #15 scalpel blade. The skin margins were undermined to an appropriate distance in all directions utilizing iris scissors.
Referred To Oculoplastics For Closure Text (Leave Blank If You Do Not Want): After obtaining clear surgical margins the patient was sent to oculoplastics for surgical repair. The patient understands they will receive post-surgical care and follow-up from the referring physician's office.
Composite Graft Text: The defect edges were debeveled with a #15 scalpel blade. Given the location of the defect, shape of the defect, the proximity to free margins and the fact the defect was full thickness a composite graft was deemed most appropriate. The defect was outline and then transferred to the donor site. A full thickness graft was then excised from the donor site. The graft was then placed in the primary defect, oriented appropriately and then sutured into place. The secondary defect was then repaired using a primary closure.
Mastoid Interpolation Flap Division And Inset Text: Division and inset of the mastoid interpolation flap was performed to achieve optimal aesthetic result, restore normal anatomic appearance and avoid distortion of normal anatomy, expedite and facilitate wound healing, achieve optimal functional result and because linear closure either not possible or would produce suboptimal result. The patient was prepped and draped in the usual manner. The pedicle was infiltrated with local anesthesia. The pedicle was sectioned with a #15 blade. The pedicle was de-bulked and trimmed to match the shape of the defect. Hemostasis was achieved. The flap donor site and free margin of the flap were secured with deep buried sutures and the wound edges were re-approximated.
Show Asc Variables: Yes
Melolabial Interpolation Flap Division And Inset Text: Division and inset of the melolabial interpolation flap was performed to achieve optimal aesthetic result, restore normal anatomic appearance and avoid distortion of normal anatomy, expedite and facilitate wound healing, achieve optimal functional result and because linear closure either not possible or would produce suboptimal result. The patient was prepped and draped in the usual manner. The pedicle was infiltrated with local anesthesia. The pedicle was sectioned with a #15 blade. The pedicle was de-bulked and trimmed to match the shape of the defect. Hemostasis was achieved. The flap donor site and free margin of the flap were secured with deep buried sutures and the wound edges were re-approximated.
Ear Wedge Repair Text: A wedge excision was completed by carrying down an excision through the full thickness of the ear and cartilage with an inward facing Burow's triangle. The wound was then closed in a layered fashion.
Purse String (Simple) Text: Given the location of the defect and the characteristics of the surrounding skin a purse string closure was deemed most appropriate. Undermining was performed circumfirentially around the surgical defect. A purse string suture was then placed and tightened.
Double Island Pedicle Flap Text: The defect edges were debeveled with a #15 scalpel blade. Given the location of the defect, shape of the defect and the proximity to free margins a double island pedicle advancement flap was deemed most appropriate. Using a sterile surgical marker, an appropriate advancement flap was drawn incorporating the defect, outlining the appropriate donor tissue and placing the expected incisions within the relaxed skin tension lines where possible. The area thus outlined was incised deep to adipose tissue with a #15 scalpel blade. The skin margins were undermined to an appropriate distance in all directions around the primary defect and laterally outward around the island pedicle utilizing iris scissors. There was minimal undermining beneath the pedicle flap.
Melolabial Interpolation Flap Text: A decision was made to reconstruct the defect utilizing an interpolation axial flap and a staged reconstruction. A telfa template was made of the defect. This telfa template was then used to outline the melolabial interpolation flap. The donor area for the pedicle flap was then injected with anesthesia. The flap was excised through the skin and subcutaneous tissue down to the layer of the underlying musculature. The pedicle flap was carefully excised within this deep plane to maintain its blood supply. The edges of the donor site were undermined. The donor site was closed in a primary fashion. The pedicle was then rotated into position and sutured. Once the tube was sutured into place, adequate blood supply was confirmed with blanching and refill. The pedicle was then wrapped with xeroform gauze and dressed appropriately with a telfa and gauze bandage to ensure continued blood supply and protect the attached pedicle.
Keystone Flap Text: The defect edges were debeveled with a #15 scalpel blade. Given the location of the defect, shape of the defect a keystone flap was deemed most appropriate. Using a sterile surgical marker, an appropriate keystone flap was drawn incorporating the defect, outlining the appropriate donor tissue and placing the expected incisions within the relaxed skin tension lines where possible. The area thus outlined was incised deep to adipose tissue with a #15 scalpel blade. The skin margins were undermined to an appropriate distance in all directions around the primary defect and laterally outward around the flap utilizing iris scissors.
Closure 2 Information: This tab is for additional flaps and grafts, including complex repair and grafts and complex repair and flaps. You can also specify a different location for the additional defect, if the location is the same you do not need to select a new one. We will insert the automated text for the repair you select below just as we do for solitary flaps and grafts. Please note that at this time if you select a location with a different insurance zone you will need to override the ICD10 and CPT if appropriate.
Complex Repair And Graft Additional Text (Will Appearing After The Standard Complex Repair Text): The complex repair was not sufficient to completely close the primary defect. The remaining additional defect was repaired with the graft mentioned below.
No Repair - Repaired With Adjacent Surgical Defect Text (Leave Blank If You Do Not Want): After obtaining clear surgical margins the defect was repaired concurrently with another surgical defect which was in close approximation.
Tissue Cultured Epidermal Autograft Text: The defect edges were debeveled with a #15 scalpel blade. Given the location of the defect, shape of the defect and the proximity to free margins a tissue cultured epidermal autograft was deemed most appropriate. The graft was then trimmed to fit the size of the defect. The graft was then placed in the primary defect and oriented appropriately.

## 2018-04-03 ENCOUNTER — APPOINTMENT (RX ONLY)
Dept: URBAN - METROPOLITAN AREA CLINIC 121 | Facility: CLINIC | Age: 68
Setting detail: DERMATOLOGY
End: 2018-04-03

## 2018-04-03 DIAGNOSIS — Z48.02 ENCOUNTER FOR REMOVAL OF SUTURES: ICD-10-CM

## 2018-04-03 PROBLEM — C44.229 SQUAMOUS CELL CARCINOMA OF SKIN OF LEFT EAR AND EXTERNAL AURICULAR CANAL: Status: ACTIVE | Noted: 2018-04-03

## 2018-04-03 PROCEDURE — 17312 MOHS ADDL STAGE: CPT

## 2018-04-03 PROCEDURE — 15120 SPLT AGRFT F/S/N/H/F/G/M 1ST: CPT

## 2018-04-03 PROCEDURE — ? MOHS SURGERY

## 2018-04-03 PROCEDURE — ? REPAIR NOTE

## 2018-04-03 PROCEDURE — 17311 MOHS 1 STAGE H/N/HF/G: CPT

## 2018-04-03 PROCEDURE — ? SUTURE REMOVAL (GLOBAL PERIOD)

## 2018-04-03 ASSESSMENT — LOCATION ZONE DERM: LOCATION ZONE: ARM

## 2018-04-03 ASSESSMENT — LOCATION SIMPLE DESCRIPTION DERM: LOCATION SIMPLE: LEFT FOREARM

## 2018-04-03 ASSESSMENT — LOCATION DETAILED DESCRIPTION DERM: LOCATION DETAILED: LEFT PROXIMAL DORSAL FOREARM

## 2018-04-03 NOTE — PROCEDURE: SUTURE REMOVAL (GLOBAL PERIOD)
Add 00690 Cpt? (Important Note: In 2017 The Use Of 85540 Is Being Tracked By Cms To Determine Future Global Period Reimbursement For Global Periods): no
Detail Level: Detailed

## 2018-04-03 NOTE — PROCEDURE: MOHS SURGERY
Quadrant Reporting?: no
Consent 1/Introductory Paragraph: Various treatment options for skin cancer treatment; including but not limited to no treatment, cryosurgery or cryotherapy, excision, radiation therapy, electrodessication and curettage, topical therapeutic agents and light therapy were discussed in depth with the patient. The rationale for Mohs was explained to the patient and consent was obtained. The risks, benefits and alternatives to therapy were discussed in detail. Specifically, the risks of infection, scarring, bleeding, prolonged wound healing, incomplete removal, allergy to anesthesia, nerve injury and recurrence were addressed. Prior to the procedure, the treatment site was clearly identified and confirmed by the patient and the patient agreed that Mohs surgery is the best treatment option for their lesion. No guarantees were made or implied; close follow-up was stressed out to the patient. All components of Universal Protocol/PAUSE Rule completed. treatment site was clearly identified and confirmed by the patient. All components of Universal Protocol/PAUSE Rule completed.
Incorporate Mauc Into Note After Indications: Yes
Epidermal Closure Graft Donor Site (Optional): simple interrupted
Stage 8: Additional Anesthesia Volume In Cc: 0
Bilateral Helical Rim Advancement Flap Text: The defect edges were debeveled with a #15 blade scalpel. Given the location of the defect and the proximity to free margins (helical rim) a bilateral helical rim advancement flap was deemed most appropriate. Using a sterile surgical marker, the appropriate advancement flaps were drawn incorporating the defect and placing the expected incisions between the helical rim and antihelix where possible. The area thus outlined was incised through and through with a #15 scalpel blade. With a skin hook and iris scissors, the flaps were gently and sharply undermined and freed up.
Alar Island Pedicle Flap Text: The defect edges were debeveled with a #15 scalpel blade. Given the location of the defect, shape of the defect and the proximity to the alar rim an island pedicle advancement flap was deemed most appropriate. Using a sterile surgical marker, an appropriate advancement flap was drawn incorporating the defect, outlining the appropriate donor tissue and placing the expected incisions within the nasal ala running parallel to the alar rim. The area thus outlined was incised with a #15 scalpel blade. The skin margins were undermined minimally to an appropriate distance in all directions around the primary defect and laterally outward around the island pedicle utilizing iris scissors. There was minimal undermining beneath the pedicle flap.
Island Pedicle Flap-Requiring Vessel Identification Text: The defect edges were debeveled with a #15 scalpel blade. Given the location of the defect, shape of the defect and the proximity to free margins an island pedicle advancement flap was deemed most appropriate. Using a sterile surgical marker, an appropriate advancement flap was drawn, based on the axial vessel mentioned above, incorporating the defect, outlining the appropriate donor tissue and placing the expected incisions within the relaxed skin tension lines where possible. The area thus outlined was incised deep to adipose tissue with a #15 scalpel blade. The skin margins were undermined to an appropriate distance in all directions around the primary defect and laterally outward around the island pedicle utilizing iris scissors. There was minimal undermining beneath the pedicle flap.
Advancement Flap (Single) Text: The defect edges were debeveled with a #15 scalpel blade. Given the location of the defect and the proximity to free margins a single advancement flap was deemed most appropriate. Using a sterile surgical marker, an appropriate advancement flap was drawn incorporating the defect and placing the expected incisions within the relaxed skin tension lines where possible. The area thus outlined was incised deep to adipose tissue with a #15 scalpel blade. The skin margins were undermined to an appropriate distance in all directions utilizing iris scissors.
Dorsal Nasal Flap Text: The defect edges were debeveled with a #15 scalpel blade. Given the location of the defect and the proximity to free margins a dorsal nasal flap was deemed most appropriate. Using a sterile surgical marker, an appropriate dorsal nasal flap was drawn around the defect. The area thus outlined was incised deep to adipose tissue with a #15 scalpel blade. The skin margins were undermined to an appropriate distance in all directions utilizing iris scissors.
Skin Substitute Text: The defect edges were debeveled with a #15 scalpel blade. Given the location of the defect, shape of the defect and the proximity to free margins a skin substitute graft was deemed most appropriate. The graft material was trimmed to fit the size of the defect. The graft was then placed in the primary defect and oriented appropriately.
Post-Care Instructions: I reviewed with the patient in detail post-care instructions. Patient is not to engage in any heavy lifting, exercise, or swimming for the next 14 days. Should the patient develop any fevers, chills, bleeding, severe pain patient will contact the office immediately.
Partial Purse String (Simple) Text: Given the location of the defect and the characteristics of the surrounding skin a simple purse string closure was deemed most appropriate. Undermining was performed circumfirentially around the surgical defect. A purse string suture was then placed and tightened. Wound tension only allowed a partial closure of the circular defect.
Crescentic Intermediate Repair Preamble Text (Leave Blank If You Do Not Want): Undermining was performed with blunt dissection.
V-Y Flap Text: The defect edges were debeveled with a #15 scalpel blade. Given the location of the defect, shape of the defect and the proximity to free margins a V-Y flap was deemed most appropriate. Using a sterile surgical marker, an appropriate advancement flap was drawn incorporating the defect and placing the expected incisions within the relaxed skin tension lines where possible. The area thus outlined was incised deep to adipose tissue with a #15 scalpel blade. The skin margins were undermined to an appropriate distance in all directions utilizing iris scissors.
Wound Care: Bacitracin
W Plasty Text: The lesion was extirpated to the level of the fat with a #15 scalpel blade. Given the location of the defect, shape of the defect and the proximity to free margins a W-plasty was deemed most appropriate for repair. Using a sterile surgical marker, the appropriate transposition arms of the W-plasty were drawn incorporating the defect and placing the expected incisions within the relaxed skin tension lines where possible. The area thus outlined was incised deep to adipose tissue with a #15 scalpel blade. The skin margins were undermined to an appropriate distance in all directions utilizing iris scissors. The opposing transposition arms were then transposed into place in opposite direction and anchored with interrupted buried subcutaneous sutures.
Surgeon/Pathologist Verbiage (Will Incorporate Name Of Surgeon From Intro If Not Blank): operated in two distinct and integrated capacities as the surgeon and pathologist.
Consent (Ear)/Introductory Paragraph: The rationale for Mohs was explained to the patient and consent was obtained. The risks, benefits and alternatives to therapy were discussed in detail. Specifically, the risks of ear deformity, infection, scarring, bleeding, prolonged wound healing, incomplete removal, allergy to anesthesia, nerve injury and recurrence were addressed. Prior to the procedure, the treatment site was clearly identified and confirmed by the patient. All components of Universal Protocol/PAUSE Rule completed.
Consent (Temporal Branch)/Introductory Paragraph: The rationale for Mohs was explained to the patient and consent was obtained. The risks, benefits and alternatives to therapy were discussed in detail. Specifically, the risks of damage to the temporal branch of the facial nerve, infection, scarring, bleeding, prolonged wound healing, incomplete removal, allergy to anesthesia, and recurrence were addressed. Prior to the procedure, the treatment site was clearly identified and confirmed by the patient. All components of Universal Protocol/PAUSE Rule completed.
Wound Care (No Sutures): Petrolatum
Suture Removal: 7 days
Afx Histology Text: there is a poorly differentiated spindled cell neoplasm composed of fascicles of atypical spindled and epithelioid cells, infiltrating and replacing papillary and reticular dermis. Mitotic activity is brisk, including atypical forms. The lesion is present on a sun-damaged skin.  This pattern supports the diagnosis of atypical fibrous xanthoma
Cheiloplasty (Less Than 50%) Text: A decision was made to reconstruct the defect with a  cheiloplasty. The defect was undermined extensively. Additional obicularis oris muscle was excised with a 15 blade scalpel. The defect was converted into a full thickness wedge, of less than 50% of the vertical height of the lip, to facilite a better cosmetic result. Small vessels were then tied off with 5-0 monocyrl. The obicularis oris, superficial fascia, adipose and dermis were then reapproximated. After the deeper layers were approximated the epidermis was reapproximated with particular care given to realign the vermilion border.
Stage 6: Additional Anesthesia Type: 1% lidocaine with epinephrine
Spiral Flap Text: The defect edges were debeveled with a #15 scalpel blade. Given the location of the defect, shape of the defect and the proximity to free margins a spiral flap was deemed most appropriate. Using a sterile surgical marker, an appropriate rotation flap was drawn incorporating the defect and placing the expected incisions within the relaxed skin tension lines where possible. The area thus outlined was incised deep to adipose tissue with a #15 scalpel blade. The skin margins were undermined to an appropriate distance in all directions utilizing iris scissors.
No Residual Tumor Seen Histology Text: There were no malignant cells seen in the sections examined.
Referring Physician (Optional): Rose peraza
Melanoma In Situ Histology Text: On a sun-damaged skin, there is a broad and asymmetrical proliferation of enlarged and atypical melanocytes present continuously as single cells and in small irregular coalescing nests along the dermoepidermal junction. The melanocytes extend into the superficial portions of epithelial structures of adnexa. Pagetoid spread of melanocytes is appreciated. Occasional mitotic figures and individually necrotic melanocytes are also noted. In the underlying papillary dermis, there is mild lymphocytic inflammatory infiltrate with prominent dermal fibroplasia and melanophages.
Localized Dermabrasion Text: The patient was draped in routine manner. Localized dermabrasion using 3 x 17 mm wire brush was performed in routine manner to papillary dermis. This spot dermabrasion is being performed to complete skin cancer reconstruction. It also will eliminate the other sun damaged precancerous cells that are known to be part of the regional effect of a lifetime's worth of sun exposure. This localized dermabrasion is therapeutic and should not be considered cosmetic in any regard.
Medical Necessity Statement: Based on my medical judgement; after reviewing the pertinent pathology report, physical exam findings and the various treatment options for skin cancer treatment; standard excision and/or destruction technique methods are not clinically sufficient treatment options. To prevent the risk of compromising surgical cure and reconstruction; prompt microscopic examination of the surgical margins is necessary. Based on the given indication(s), maximum conservation of healthy tissue, and high cure rate, Mohs surgery is the most appropriate treatment for this cancer.
Rhombic Flap Text: The defect edges were debeveled with a #15 scalpel blade. Given the location of the defect and the proximity to free margins a rhombic flap was deemed most appropriate. Using a sterile surgical marker, an appropriate rhombic flap was drawn incorporating the defect. The area thus outlined was incised deep to adipose tissue with a #15 scalpel blade. The skin margins were undermined to an appropriate distance in all directions utilizing iris scissors.
Perineural Invasion (For Histology - Be Specific If Possible): absent
Bcc Infiltrative Histology Text: There were numerous aggregates of basaloid cells demonstrating an infiltrative pattern
Manual Repair Warning Statement: We plan on removing the manually selected variable below in favor of our much easier automatic structured text blocks found in the previous tab. We decided to do this to help make the flow better and give you the full power of structured data. Manual selection is never going to be ideal in our platform and I would encourage you to avoid using manual selection from this point on, especially since I will be sunsetting this feature. It is important that you do one of two things with the customized text below. First, you can save all of the text in a word file so you can have it for future reference. Second, transfer the text to the appropriate area in the Library tab. Lastly, if there is a flap or graft type which we do not have you need to let us know right away so I can add it in before the variable is hidden. No need to panic, we plan to give you roughly 6 months to make the change.
Graft Donor Site Bandage (Optional-Leave Blank If You Don't Want In Note): Steri-strips and a pressure bandage were applied to the donor site.
Cheek Interpolation Flap Text: A decision was made to reconstruct the defect utilizing an interpolation axial flap and a staged reconstruction. A telfa template was made of the defect. This telfa template was then used to outline the Cheek Interpolation flap. The donor area for the pedicle flap was then injected with anesthesia. The flap was excised through the skin and subcutaneous tissue down to the layer of the underlying musculature. The interpolation flap was carefully excised within this deep plane to maintain its blood supply. The edges of the donor site were undermined. The donor site was closed in a primary fashion. The pedicle was then rotated into position and sutured. Once the tube was sutured into place, adequate blood supply was confirmed with blanching and refill. The pedicle was then wrapped with xeroform gauze and dressed appropriately with a telfa and gauze bandage to ensure continued blood supply and protect the attached pedicle.
V-Y Plasty Text: The defect edges were debeveled with a #15 scalpel blade. Given the location of the defect, shape of the defect and the proximity to free margins an V-Y advancement flap was deemed most appropriate. Using a sterile surgical marker, an appropriate advancement flap was drawn incorporating the defect and placing the expected incisions within the relaxed skin tension lines where possible. The area thus outlined was incised deep to adipose tissue with a #15 scalpel blade. The skin margins were undermined to an appropriate distance in all directions utilizing iris scissors.
Previous Accession (Optional): [id number]
Crescentic Complex Repair Preamble Text (Leave Blank If You Do Not Want): Extensive wide undermining was performed.
Consent (Spinal Accessory)/Introductory Paragraph: The rationale for Mohs was explained to the patient and consent was obtained. The risks, benefits and alternatives to therapy were discussed in detail. Specifically, the risks of damage to the spinal accessory nerve, infection, scarring, bleeding, prolonged wound healing, incomplete removal, allergy to anesthesia, and recurrence were addressed. Prior to the procedure, the treatment site was clearly identified and confirmed by the patient. All components of Universal Protocol/PAUSE Rule completed.
Consent (Scalp)/Introductory Paragraph: The rationale for Mohs was explained to the patient and consent was obtained. The risks, benefits and alternatives to therapy were discussed in detail. Specifically, the risks of changes in hair growth pattern secondary to repair, infection, scarring, bleeding, prolonged wound healing, incomplete removal, allergy to anesthesia, nerve injury and recurrence were addressed. Prior to the procedure, the treatment site was clearly identified and confirmed by the patient. All components of Universal Protocol/PAUSE Rule completed.
Composite Graft Text: The defect edges were debeveled with a #15 scalpel blade. Given the location of the defect, shape of the defect, the proximity to free margins and the fact the defect was full thickness a composite graft was deemed most appropriate. The defect was outline and then transferred to the donor site. A full thickness graft was then excised from the donor site. The graft was then placed in the primary defect, oriented appropriately and then sutured into place. The secondary defect was then repaired using a primary closure.
Burow's Advancement Flap Text: The defect edges were debeveled with a #15 scalpel blade. Given the location of the defect and the proximity to free margins a Burow's advancement flap was deemed most appropriate. Using a sterile surgical marker, the appropriate advancement flap was drawn incorporating the defect and placing the expected incisions within the relaxed skin tension lines where possible. The area thus outlined was incised deep to adipose tissue with a #15 scalpel blade. The skin margins were undermined to an appropriate distance in all directions utilizing iris scissors.
Additional Anesthesia Volume In Cc: 6
Posterior Auricular Interpolation Flap Text: A decision was made to reconstruct the defect utilizing an interpolation axial flap and a staged reconstruction. A telfa template was made of the defect. This telfa template was then used to outline the posterior auricular interpolation flap. The donor area for the pedicle flap was then injected with anesthesia. The flap was excised through the skin and subcutaneous tissue down to the layer of the underlying musculature. The pedicle flap was carefully excised within this deep plane to maintain its blood supply. The edges of the donor site were undermined. The donor site was closed in a primary fashion. The pedicle was then rotated into position and sutured. Once the tube was sutured into place, adequate blood supply was confirmed with blanching and refill. The pedicle was then wrapped with xeroform gauze and dressed appropriately with a telfa and gauze bandage to ensure continued blood supply and protect the attached pedicle.
Area L Indication Text: Tumors in this location are included in Area L (trunk and extremities). Mohs surgery is indicated for larger tumors, or tumors with aggressive histologic features, in these anatomic locations.
Surgeon Performing Repair (Optional): 
Purse String (Intermediate) Text: Given the location of the defect and the characteristics of the surrounding skin a pursestring intermediate closure was deemed most appropriate. Undermining was performed circumfirentially around the surgical defect. A purstring suture was then placed and tightened.
Xenograft Text: The defect edges were debeveled with a #15 scalpel blade. Given the location of the defect, shape of the defect and the proximity to free margins a xenograft was deemed most appropriate. The graft was then trimmed to fit the size of the defect. The graft was then placed in the primary defect and oriented appropriately.
Bcc Histology Text: Beneath an irregular epidermis, there are small nodular masses of basaloid neoplastic cells with nuclear pleomorphism attached to the basal layer and surrounded by a loose fibrous stroma and mild inflammation in the dermis. The findings are typical for a basal cell carcinoma.
Paramedian Forehead Flap Text: A decision was made to reconstruct the defect utilizing an interpolation axial flap and a staged reconstruction. A telfa template was made of the defect. This telfa template was then used to outline the paramedian forehead pedicle flap. The donor area for the pedicle flap was then injected with anesthesia. The flap was excised through the skin and subcutaneous tissue down to the layer of the underlying musculature. The pedicle flap was carefully excised within this deep plane to maintain its blood supply. The edges of the donor site were undermined. The donor site was closed in a primary fashion. The pedicle was then rotated into position and sutured. Once the tube was sutured into place, adequate blood supply was confirmed with blanching and refill. The pedicle was then wrapped with xeroform gauze and dressed appropriately with a telfa and gauze bandage to ensure continued blood supply and protect the attached pedicle.
Donor Site Anesthesia Type: same as repair anesthesia
Muscle Hinge Flap Text: The defect edges were debeveled with a #15 scalpel blade. Given the size, depth and location of the defect and the proximity to free margins a muscle hinge flap was deemed most appropriate. Using a sterile surgical marker, an appropriate hinge flap was drawn incorporating the defect. The area thus outlined was incised with a #15 scalpel blade. The skin margins were undermined to an appropriate distance in all directions utilizing iris scissors.
Bilobed Flap Text: The defect edges were debeveled with a #15 scalpel blade. Given the location of the defect and the proximity to free margins a bilobe flap was deemed most appropriate. Using a sterile surgical marker, an appropriate bilobe flap drawn around the defect. The area thus outlined was incised deep to adipose tissue with a #15 scalpel blade. The skin margins were undermined to an appropriate distance in all directions utilizing iris scissors.
Surgical Defect Length In Cm (Optional): 1.4
S Plasty Text: Given the location and shape of the defect, and the orientation of relaxed skin tension lines, an S-plasty was deemed most appropriate for repair. Using a sterile surgical marker, the appropriate outline of the S-plasty was drawn, incorporating the defect and placing the expected incisions within the relaxed skin tension lines where possible. The area thus outlined was incised deep to adipose tissue with a #15 scalpel blade. The skin margins were undermined to an appropriate distance in all directions utilizing iris scissors. The skin flaps were advanced over the defect. The opposing margins were then approximated with interrupted buried subcutaneous sutures.
Stage 2: Number Of Blocks?: 1
Area H Indication Text: Tumors in this location are included in Area H (eyelids, eyebrows, nose, lips, chin, ear, pre-auricular, post-auricular, temple, genitalia, hands, feet, ankles and areola). Tissue conservation is critical in these anatomic locations.
Modified Advancement Flap Text: The defect edges were debeveled with a #15 scalpel blade. Given the location of the defect, shape of the defect and the proximity to free margins a modified advancement flap was deemed most appropriate. Using a sterile surgical marker, an appropriate advancement flap was drawn incorporating the defect and placing the expected incisions within the relaxed skin tension lines where possible. The area thus outlined was incised deep to adipose tissue with a #15 scalpel blade. The skin margins were undermined to an appropriate distance in all directions utilizing iris scissors.
Dressing (No Sutures): dry sterile dressing
O-T Advancement Flap Text: The defect edges were debeveled with a #15 scalpel blade. Given the location of the defect, shape of the defect and the proximity to free margins an O-T advancement flap was deemed most appropriate. Using a sterile surgical marker, an appropriate advancement flap was drawn incorporating the defect and placing the expected incisions within the relaxed skin tension lines where possible. The area thus outlined was incised deep to adipose tissue with a #15 scalpel blade. The skin margins were undermined to an appropriate distance in all directions utilizing iris scissors.
Scc In Situ Histology Text: There is hyperkeratosis, acanthosis, and cytologic atypia of keratinocytes with hyperchromatic and pleomorphic nuclei throughout the full thickness of the epidermis. No dermal invasion is seen. Chronic inflammation is present in the dermis.
Mohs Case Number: [id number]
Secondary Intention Text (Leave Blank If You Do Not Want): The defect will heal with secondary intention.
Closure 3 Information: This tab is for additional flaps and grafts above and beyond our usual structured repairs. Please note if you enter information here it will not currently bill and you will need to add the billing information manually.
Bcc  Morpheaform/Sclerosing Histology Text: irregular nests of pleomorphic, hyperchromatic basaloid cells with peripheral palisading infiltrate the tissue in a diffuse fashion in association with a mucoid stroma and dense dermal sclerosis.  The tumor infiltrates in thin strands and single cells among the sclerotic collagen fibers in a pattern of morpheaform variant of basal cell carcinoma
O-T Plasty Text: The defect edges were debeveled with a #15 scalpel blade. Given the location of the defect, shape of the defect and the proximity to free margins an O-T plasty was deemed most appropriate. Using a sterile surgical marker, an appropriate O-T plasty was drawn incorporating the defect and placing the expected incisions within the relaxed skin tension lines where possible. The area thus outlined was incised deep to adipose tissue with a #15 scalpel blade. The skin margins were undermined to an appropriate distance in all directions utilizing iris scissors.
Consent 3/Introductory Paragraph: I gave the patient a chance to ask questions they had about the procedure. Following this I explained the Mohs procedure and consent was obtained. The risks, benefits and alternatives to therapy were discussed in detail. Specifically, the risks of infection, scarring, bleeding, prolonged wound healing, incomplete removal, allergy to anesthesia, nerve injury and recurrence were addressed. Prior to the procedure, the treatment site was clearly identified and confirmed by the patient. All components of Universal Protocol/PAUSE Rule completed.
Surgical Defect Length In Cm (Optional): 1.2
Ear Wedge Repair Text: A wedge excision was completed by carrying down an excision through the full thickness of the ear and cartilage with an inward facing Burow's triangle. The wound was then closed in a layered fashion.
Hatchet Flap Text: The defect edges were debeveled with a #15 scalpel blade. Given the location of the defect, shape of the defect and the proximity to free margins a hatchet flap was deemed most appropriate. Using a sterile surgical marker, an appropriate hatchet flap was drawn incorporating the defect and placing the expected incisions within the relaxed skin tension lines where possible. The area thus outlined was incised deep to adipose tissue with a #15 scalpel blade. The skin margins were undermined to an appropriate distance in all directions utilizing iris scissors.
Split-Thickness Skin Graft Text: The defect edges were debeveled with a #15 scalpel blade. Given the location of the defect, shape of the defect and the proximity to free margins a split thickness skin graft was deemed most appropriate. Using a sterile surgical marker, the primary defect shape was transferred to the donor site. The split thickness graft was then harvested. The skin graft was then placed in the primary defect and oriented appropriately.
Mohs Rapid Report Verbiage: The area of clinically evident tumor was marked with skin marking ink and appropriately hatched. The initial incision was made following the Mohs approach through the skin. The specimen was taken to the lab, divided into the necessary number of pieces, chromacoded and processed according to the Mohs protocol. This was repeated in successive stages until a tumor free defect was achieved.
Referred To Asc For Closure Text (Leave Blank If You Do Not Want): After obtaining clear surgical margins the patient was sent to an [facility] for surgical repair. The patient understands they will receive post-surgical care and follow-up from the [facility] physician.
Bcc  Nodular Histology Text: There are uniform nodular masses of basaloid neoplastic cells with scanty cytoplasm and peripheral palisading with a loose fibrous stroma. The appearance is typical for a nodular basal cell carcinoma.
Melolabial Interpolation Flap Text: A decision was made to reconstruct the defect utilizing an interpolation axial flap and a staged reconstruction. A telfa template was made of the defect. This telfa template was then used to outline the melolabial interpolation flap. The donor area for the pedicle flap was then injected with anesthesia. The flap was excised through the skin and subcutaneous tissue down to the layer of the underlying musculature. The pedicle flap was carefully excised within this deep plane to maintain its blood supply. The edges of the donor site were undermined. The donor site was closed in a primary fashion. The pedicle was then rotated into position and sutured. Once the tube was sutured into place, adequate blood supply was confirmed with blanching and refill. The pedicle was then wrapped with xeroform gauze and dressed appropriately with a telfa and gauze bandage to ensure continued blood supply and protect the attached pedicle.
Closure 2 Information: This tab is for additional flaps and grafts, including complex repair and grafts and complex repair and flaps. You can also specify a different location for the additional defect, if the location is the same you do not need to select a new one. We will insert the automated text for the repair you select below just as we do for solitary flaps and grafts. Please note that at this time if you select a location with a different insurance zone you will need to override the ICD10 and CPT if appropriate.
Dermal Autograft Text: The defect edges were debeveled with a #15 scalpel blade. Given the location of the defect, shape of the defect and the proximity to free margins a dermal autograft was deemed most appropriate. Using a sterile surgical marker, the primary defect shape was transferred to the donor site. The area thus outlined was incised deep to adipose tissue with a #15 scalpel blade. The harvested graft was then trimmed of adipose and epidermal tissue until only dermis was left. The skin graft was then placed in the primary defect and oriented appropriately.
Scc Histology Text: There is hyperkeratosis, acanthosis, and cytologic atypia of keratinocytes with hyperchromatic and pleomorphic nuclei throughout the epidermis. No dermal invasion is seen. Chronic inflammation is present in the dermis.
Referred To Oculoplastics For Closure Text (Leave Blank If You Do Not Want): After obtaining clear surgical margins the patient was sent to oculoplastics for surgical repair. The patient understands they will receive post-surgical care and follow-up from the referring physician's office.
Cheek-To-Nose Interpolation Flap Text: A decision was made to reconstruct the defect utilizing an interpolation axial flap and a staged reconstruction. A telfa template was made of the defect. This telfa template was then used to outline the Cheek-To-Nose Interpolation flap. The donor area for the pedicle flap was then injected with anesthesia. The flap was excised through the skin and subcutaneous tissue down to the layer of the underlying musculature. The interpolation flap was carefully excised within this deep plane to maintain its blood supply. The edges of the donor site were undermined. The donor site was closed in a primary fashion. The pedicle was then rotated into position and sutured. Once the tube was sutured into place, adequate blood supply was confirmed with blanching and refill. The pedicle was then wrapped with xeroform gauze and dressed appropriately with a telfa and gauze bandage to ensure continued blood supply and protect the attached pedicle.
Rotation Flap Text: The defect edges were debeveled with a #15 scalpel blade. Given the location of the defect, shape of the defect and the proximity to free margins a rotation flap was deemed most appropriate. Using a sterile surgical marker, an appropriate rotation flap was drawn incorporating the defect and placing the expected incisions within the relaxed skin tension lines where possible. The area thus outlined was incised deep to adipose tissue with a #15 scalpel blade. The skin margins were undermined to an appropriate distance in all directions utilizing iris scissors.
Star Wedge Flap Text: The defect edges were debeveled with a #15 scalpel blade. Given the location of the defect, shape of the defect and the proximity to free margins a star wedge flap was deemed most appropriate. Using a sterile surgical marker, an appropriate rotation flap was drawn incorporating the defect and placing the expected incisions within the relaxed skin tension lines where possible. The area thus outlined was incised deep to adipose tissue with a #15 scalpel blade. The skin margins were undermined to an appropriate distance in all directions utilizing iris scissors.
Z Plasty Text: The lesion was extirpated to the level of the fat with a #15 scalpel blade. Given the location of the defect, shape of the defect and the proximity to free margins a Z-plasty was deemed most appropriate for repair. Using a sterile surgical marker, the appropriate transposition arms of the Z-plasty were drawn incorporating the defect and placing the expected incisions within the relaxed skin tension lines where possible. The area thus outlined was incised deep to adipose tissue with a #15 scalpel blade. The skin margins were undermined to an appropriate distance in all directions utilizing iris scissors. The opposing transposition arms were then transposed into place in opposite direction and anchored with interrupted buried subcutaneous sutures.
Alternatives Discussed Intro (Do Not Add Period): I discussed alternative treatments to Mohs surgery and specifically discussed the risks and benefits of
Consent (Nose)/Introductory Paragraph: The rationale for Mohs was explained to the patient and consent was obtained. The risks, benefits and alternatives to therapy were discussed in detail. Specifically, the risks of nasal deformity, changes in the flow of air through the nose, infection, scarring, bleeding, prolonged wound healing, incomplete removal, allergy to anesthesia, nerve injury and recurrence were addressed. Prior to the procedure, the treatment site was clearly identified and confirmed by the patient. All components of Universal Protocol/PAUSE Rule completed.
Double Island Pedicle Flap Text: The defect edges were debeveled with a #15 scalpel blade. Given the location of the defect, shape of the defect and the proximity to free margins a double island pedicle advancement flap was deemed most appropriate. Using a sterile surgical marker, an appropriate advancement flap was drawn incorporating the defect, outlining the appropriate donor tissue and placing the expected incisions within the relaxed skin tension lines where possible. The area thus outlined was incised deep to adipose tissue with a #15 scalpel blade. The skin margins were undermined to an appropriate distance in all directions around the primary defect and laterally outward around the island pedicle utilizing iris scissors. There was minimal undermining beneath the pedicle flap.
H Plasty Text: Given the location of the defect, shape of the defect and the proximity to free margins a H-plasty was deemed most appropriate for repair. Using a sterile surgical marker, the appropriate advancement arms of the H-plasty were drawn incorporating the defect and placing the expected incisions within the relaxed skin tension lines where possible. The area thus outlined was incised deep to adipose tissue with a #15 scalpel blade. The skin margins were undermined to an appropriate distance in all directions utilizing iris scissors. The opposing advancement arms were then advanced into place in opposite direction and anchored with interrupted buried subcutaneous sutures.
Consent (Lip)/Introductory Paragraph: The rationale for Mohs was explained to the patient and consent was obtained. The risks, benefits and alternatives to therapy were discussed in detail. Specifically, the risks of lip deformity, changes in the oral aperture, infection, scarring, bleeding, prolonged wound healing, incomplete removal, allergy to anesthesia, nerve injury and recurrence were addressed. Prior to the procedure, the treatment site was clearly identified and confirmed by the patient. All components of Universal Protocol/PAUSE Rule completed.
Repair Performed By Another Provider Text (Leave Blank If You Do Not Want): After obtaining clear surgical margins the defect was repaired by another provider.
Trilobed Flap Text: The defect edges were debeveled with a #15 scalpel blade. Given the location of the defect and the proximity to free margins a trilobed flap was deemed most appropriate. Using a sterile surgical marker, an appropriate trilobed flap drawn around the defect. The area thus outlined was incised deep to adipose tissue with a #15 scalpel blade. The skin margins were undermined to an appropriate distance in all directions utilizing iris scissors.
Crescentic Advancement Flap Text: The defect edges were debeveled with a #15 scalpel blade. Given the location of the defect and the proximity to free margins a crescentic advancement flap was deemed most appropriate. Using a sterile surgical marker, the appropriate advancement flap was drawn incorporating the defect and placing the expected incisions within the relaxed skin tension lines where possible. The area thus outlined was incised deep to adipose tissue with a #15 scalpel blade. The skin margins were undermined to an appropriate distance in all directions utilizing iris scissors.
Cheiloplasty (Complex) Text: A decision was made to reconstruct the defect with a  cheiloplasty. The defect was undermined extensively. Additional obicularis oris muscle was excised with a 15 blade scalpel. The defect was converted into a full thickness wedge to facilite a better cosmetic result. Small vessels were then tied off with 5-0 monocyrl. The obicularis oris, superficial fascia, adipose and dermis were then reapproximated. After the deeper layers were approximated the epidermis was reapproximated with particular care given to realign the vermilion border.
Ear Star Wedge Flap Text: The defect edges were debeveled with a #15 blade scalpel. Given the location of the defect and the proximity to free margins (helical rim) an ear star wedge flap was deemed most appropriate. Using a sterile surgical marker, the appropriate flap was drawn incorporating the defect and placing the expected incisions between the helical rim and antihelix where possible. The area thus outlined was incised through and through with a #15 scalpel blade.
Consent (Marginal Mandibular)/Introductory Paragraph: The rationale for Mohs was explained to the patient and consent was obtained. The risks, benefits and alternatives to therapy were discussed in detail. Specifically, the risks of damage to the marginal mandibular branch of the facial nerve, infection, scarring, bleeding, prolonged wound healing, incomplete removal, allergy to anesthesia, and recurrence were addressed. Prior to the procedure, the treatment site was clearly identified and confirmed by the patient. All components of Universal Protocol/PAUSE Rule completed.
Inflammation Suggestive Of Cancer Camouflage Histology Text: There was a dense lymphocytic infiltrate which prevented adequate histologic evaluation of adjacent structures.
Complex Repair And Flap Additional Text (Will Appearing After The Standard Complex Repair Text): The complex repair was not sufficient to completely close the primary defect. The remaining additional defect was repaired with the flap mentioned below.
Home Suture Removal Text: Patient was provided instructions on removing sutures and will remove their sutures at home. If they have any questions or difficulties they will call the office.
Mastoid Interpolation Flap Text: A decision was made to reconstruct the defect utilizing an interpolation axial flap and a staged reconstruction. A telfa template was made of the defect. This telfa template was then used to outline the mastoid interpolation flap. The donor area for the pedicle flap was then injected with anesthesia. The flap was excised through the skin and subcutaneous tissue down to the layer of the underlying musculature. The pedicle flap was carefully excised within this deep plane to maintain its blood supply. The edges of the donor site were undermined. The donor site was closed in a primary fashion. The pedicle was then rotated into position and sutured. Once the tube was sutured into place, adequate blood supply was confirmed with blanching and refill. The pedicle was then wrapped with xeroform gauze and dressed appropriately with a telfa and gauze bandage to ensure continued blood supply and protect the attached pedicle.
Epidermal Autograft Text: The defect edges were debeveled with a #15 scalpel blade. Given the location of the defect, shape of the defect and the proximity to free margins an epidermal autograft was deemed most appropriate. Using a sterile surgical marker, the primary defect shape was transferred to the donor site. The epidermal graft was then harvested. The skin graft was then placed in the primary defect and oriented appropriately.
Scc Poorly Differentiated Histology Text: there are strands and nests of pleomorphic cells present in the infiltrative pattern. Mitotic activity is brisk. There is vascular proliferation and acute and chronic inflammation in the dermis. The findings are those of a poorly differentiated squamous cell carcinoma.
Keystone Flap Text: The defect edges were debeveled with a #15 scalpel blade. Given the location of the defect, shape of the defect a keystone flap was deemed most appropriate. Using a sterile surgical marker, an appropriate keystone flap was drawn incorporating the defect, outlining the appropriate donor tissue and placing the expected incisions within the relaxed skin tension lines where possible. The area thus outlined was incised deep to adipose tissue with a #15 scalpel blade. The skin margins were undermined to an appropriate distance in all directions around the primary defect and laterally outward around the flap utilizing iris scissors.
Surgeon: Anni Mejia MD
Partial Purse String (Intermediate) Text: Given the location of the defect and the characteristics of the surrounding skin an intermediate purse string closure was deemed most appropriate. Undermining was performed circumfirentially around the surgical defect. A purse string suture was then placed and tightened. Wound tension only allowed a partial closure of the circular defect.
Island Pedicle Flap Text: The defect edges were debeveled with a #15 scalpel blade. Given the location of the defect, shape of the defect and the proximity to free margins an island pedicle advancement flap was deemed most appropriate. Using a sterile surgical marker, an appropriate advancement flap was drawn incorporating the defect, outlining the appropriate donor tissue and placing the expected incisions within the relaxed skin tension lines where possible. The area thus outlined was incised deep to adipose tissue with a #15 scalpel blade. The skin margins were undermined to an appropriate distance in all directions around the primary defect and laterally outward around the island pedicle utilizing iris scissors. There was minimal undermining beneath the pedicle flap.
Anesthesia Volume In Cc: 3
Closure 4 Information: This tab is for additional flaps and grafts above and beyond our usual structured repairs.  Please note if you enter information here it will not currently bill and you will need to add the billing information manually.
Mixed Nodular And Infiltrative Bcc Histology Text: Irregular nests of pleomorphic, hyperchromatic basaloid cells with peripheral palisading infiltrate the tissue in a diffuse fashion in association with sclerotic stroma
Same Histology In Subsequent Stages Text: The pattern and morphology of the tumor is as described in the first stage.
Mohs Histo Method Verbiage: Each section was then chromacoded and processed in the Mohs lab using the Mohs protocol and submitted for frozen section.
Date Of Previous Biopsy (Optional): 3-12-18
Depth Of Tumor Invasion (For Histology): dermis
Consent Type: Consent 1 (Standard)
Stage 1: Number Of Blocks?: 2
Helical Rim Advancement Flap Text: The defect edges were debeveled with a #15 blade scalpel. Given the location of the defect and the proximity to free margins (helical rim) a double helical rim advancement flap was deemed most appropriate. Using a sterile surgical marker, the appropriate advancement flaps were drawn incorporating the defect and placing the expected incisions between the helical rim and antihelix where possible. The area thus outlined was incised through and through with a #15 scalpel blade. With a skin hook and iris scissors, the flaps were gently and sharply undermined and freed up.
Advancement-Rotation Flap Text: The defect edges were debeveled with a #15 scalpel blade. Given the location of the defect, shape of the defect and the proximity to free margins an advancement-rotation flap was deemed most appropriate. Using a sterile surgical marker, an appropriate flap was drawn incorporating the defect and placing the expected incisions within the relaxed skin tension lines where possible. The area thus outlined was incised deep to adipose tissue with a #15 scalpel blade. The skin margins were undermined to an appropriate distance in all directions utilizing iris scissors.
Melolabial Transposition Flap Text: The defect edges were debeveled with a #15 scalpel blade. Given the location of the defect and the proximity to free margins a melolabial flap was deemed most appropriate. Using a sterile surgical marker, an appropriate melolabial transposition flap was drawn incorporating the defect. The area thus outlined was incised deep to adipose tissue with a #15 scalpel blade. The skin margins were undermined to an appropriate distance in all directions utilizing iris scissors.
Tissue Cultured Epidermal Autograft Text: The defect edges were debeveled with a #15 scalpel blade. Given the location of the defect, shape of the defect and the proximity to free margins a tissue cultured epidermal autograft was deemed most appropriate. The graft was then trimmed to fit the size of the defect. The graft was then placed in the primary defect and oriented appropriately.
Consent (Near Eyelid Margin)/Introductory Paragraph: The rationale for Mohs was explained to the patient and consent was obtained. The risks, benefits and alternatives to therapy were discussed in detail. Specifically, the risks of ectropion or eyelid deformity, infection, scarring, bleeding, prolonged wound healing, incomplete removal, allergy to anesthesia, nerve injury and recurrence were addressed. Prior to the procedure, the treatment site was clearly identified and confirmed by the patient. All components of Universal Protocol/PAUSE Rule completed.
Island Pedicle Flap With Canthal Suspension Text: The defect edges were debeveled with a #15 scalpel blade. Given the location of the defect, shape of the defect and the proximity to free margins an island pedicle advancement flap was deemed most appropriate. Using a sterile surgical marker, an appropriate advancement flap was drawn incorporating the defect, outlining the appropriate donor tissue and placing the expected incisions within the relaxed skin tension lines where possible. The area thus outlined was incised deep to adipose tissue with a #15 scalpel blade. The skin margins were undermined to an appropriate distance in all directions around the primary defect and laterally outward around the island pedicle utilizing iris scissors. There was minimal undermining beneath the pedicle flap. A suspension suture was placed in the canthal tendon to prevent tension and prevent ectropion.
Referred To Otolaryngology For Closure Text (Leave Blank If You Do Not Want): After obtaining clear surgical margins the patient was sent to otolaryngology for surgical repair. The patient understands they will receive post-surgical care and follow-up from the referring physician's office.
Hemostasis: Electrocautery
Full Thickness Lip Wedge Repair (Flap) Text: Given the location of the defect and the proximity to free margins a full thickness wedge repair was deemed most appropriate. Using a sterile surgical marker, the appropriate repair was drawn incorporating the defect and placing the expected incisions perpendicular to the vermilion border. The vermilion border was also meticulously outlined to ensure appropriate reapproximation during the repair. The area thus outlined was incised through and through with a #15 scalpel blade. The muscularis and dermis were reaproximated with deep sutures following hemostasis. Care was taken to realign the vermilion border before proceeding with the superficial closure. Once the vermilion was realigned the superfical and mucosal closure was finished.
Tarsorrhaphy Text: A tarsorrhaphy was performed using Frost sutures.
Mucosal Advancement Flap Text: Given the location of the defect, shape of the defect and the proximity to free margins a mucosal advancement flap was deemed most appropriate. Incisions were made with a 15 blade scalpel in the appropriate fashion along the cutaneous vermilion border and the mucosal lip. The remaining actinically damaged mucosal tissue was excised. The mucosal advancement flap was then elevated to the gingival sulcus with care taken to preserve the neurovascular structures and advanced into the primary defect. Care was taken to ensure that precise realignment of the vermilion border was achieved.
Deep Sutures: 5-0 Vicryl
O-Z Plasty Text: The defect edges were debeveled with a #15 scalpel blade. Given the location of the defect, shape of the defect and the proximity to free margins an O-Z plasty (double transposition flap) was deemed most appropriate. Using a sterile surgical marker, the appropriate transposition flaps were drawn incorporating the defect and placing the expected incisions within the relaxed skin tension lines where possible. The area thus outlined was incised deep to adipose tissue with a #15 scalpel blade. The skin margins were undermined to an appropriate distance in all directions utilizing iris scissors. Hemostasis was achieved with electrocautery. The flaps were then transposed into place, one clockwise and the other counterclockwise, and anchored with interrupted buried subcutaneous sutures.
Referred To Plastics For Closure Text (Leave Blank If You Do Not Want): After obtaining clear surgical margins the patient was sent to plastics for surgical repair. The patient understands they will receive post-surgical care and follow-up from the referring physician's office.
Area M Indication Text: Tumors in this location are included in Area M (cheek, forehead, scalp, neck, jawline and pretibial skin). Mohs surgery is indicated for tumors in these anatomic locations.
Purse String (Simple) Text: Given the location of the defect and the characteristics of the surrounding skin a pursestring closure was deemed most appropriate. Undermining was performed circumfirentially around the surgical defect. A purstring suture was then placed and tightened.
Ftsg Text: The defect edges were debeveled with a #15 scalpel blade. Given the location of the defect, shape of the defect and the proximity to free margins a full thickness skin graft was deemed most appropriate. Using a sterile surgical marker, the primary defect shape was transferred to the donor site. The area thus outlined was incised deep to adipose tissue with a #15 scalpel blade. The harvested graft was then trimmed of adipose tissue until only dermis and epidermis was left. The skin margins of the secondary defect were undermined to an appropriate distance in all directions utilizing iris scissors. The secondary defect was closed with interrupted buried subcutaneous sutures. The skin edges were then re-apposed with running  sutures. The skin graft was then placed in the primary defect and oriented appropriately.
Cartilage Graft Text: The defect edges were debeveled with a #15 scalpel blade. Given the location of the defect, shape of the defect, the fact the defect involved a full thickness cartilage defect a cartilage graft was deemed most appropriate. An appropriate donor site was identified, cleansed, and anesthetized. The cartilage graft was then harvested and transferred to the recipient site, oriented appropriately and then sutured into place. The secondary defect was then repaired using a primary closure.
Bcc Superficial Histology Text: Beneath an irregular epidermis, there are small nodular masses of basaloid neoplastic cells with nuclear pleomorphism attached to the basal layer and surrounded by a loose fibrous stroma and mild inflammation in the superficial dermis. The findings are typical for a superficial type of basal cell carcinoma.
Detail Level: Detailed
Epidermal Sutures: 6-0 Ethilon
Subsequent Stages Histo Method Verbiage: Using a similar technique to that described above, a thin layer of tissue was removed from all areas where tumor was visible on the previous stage. The tissue was again oriented, mapped, dyed, and processed as above.
Mauc Instructions: By selecting yes to the question below the [facility] number will be added into the note. This will be calculated automatically based on the diagnosis chosen, the size entered, the body zone selected (H,M,L) and the specific indications you chose. You will also have the option to override the Mohs AUC if you disagree with the automatically calculated number and this option is found in the Case Summary tab.
No Repair - Repaired With Adjacent Surgical Defect Text (Leave Blank If You Do Not Want): After obtaining clear surgical margins the defect was repaired concurrently with another surgical defect which was in close approximation.
Advancement Flap (Double) Text: The defect edges were debeveled with a #15 scalpel blade. Given the location of the defect and the proximity to free margins a double advancement flap was deemed most appropriate. Using a sterile surgical marker, the appropriate advancement flaps were drawn incorporating the defect and placing the expected incisions within the relaxed skin tension lines where possible. The area thus outlined was incised deep to adipose tissue with a #15 scalpel blade. The skin margins were undermined to an appropriate distance in all directions utilizing iris scissors.
Initial Size Of Lesion: 1.1
Interpolation Flap Text: A decision was made to reconstruct the defect utilizing an interpolation axial flap and a staged reconstruction. A telfa template was made of the defect. This telfa template was then used to outline the interpolation flap. The donor area for the pedicle flap was then injected with anesthesia. The flap was excised through the skin and subcutaneous tissue down to the layer of the underlying musculature. The interpolation flap was carefully excised within this deep plane to maintain its blood supply. The edges of the donor site were undermined. The donor site was closed in a primary fashion. The pedicle was then rotated into position and sutured. Once the tube was sutured into place, adequate blood supply was confirmed with blanching and refill. The pedicle was then wrapped with xeroform gauze and dressed appropriately with a telfa and gauze bandage to ensure continued blood supply and protect the attached pedicle.
Stage 2: Additional Anesthesia Type: 1% lidocaine with epinephrine and a 1:10 solution of 8.4% sodium bicarbonate
Postop Diagnosis: same
Consent 2/Introductory Paragraph: Mohs surgery was explained to the patient and consent was obtained. The risks, benefits and alternatives to therapy were discussed in detail. Specifically, the risks of infection, scarring, bleeding, prolonged wound healing, incomplete removal, allergy to anesthesia, nerve injury and recurrence were addressed. Prior to the procedure, the treatment site was clearly identified and confirmed by the patient. All components of Universal Protocol/PAUSE Rule completed.
Transposition Flap Text: The defect edges were debeveled with a #15 scalpel blade. Given the location of the defect and the proximity to free margins a transposition flap was deemed most appropriate. Using a sterile surgical marker, an appropriate transposition flap was drawn incorporating the defect. The area thus outlined was incised deep to adipose tissue with a #15 scalpel blade. The skin margins were undermined to an appropriate distance in all directions utilizing iris scissors.
Referred To Mid-Level For Closure Text (Leave Blank If You Do Not Want): After obtaining clear surgical margins the patient was sent to a mid-level provider for surgical repair. The patient understands they will receive post-surgical care and follow-up from the mid-level provider.
Bilobed Transposition Flap Text: The defect edges were debeveled with a #15 scalpel blade. Given the location of the defect and the proximity to free margins a bilobed transposition flap was deemed most appropriate. Using a sterile surgical marker, an appropriate bilobe flap drawn around the defect. The area thus outlined was incised deep to adipose tissue with a #15 scalpel blade. The skin margins were undermined to an appropriate distance in all directions utilizing iris scissors.
Scc Ka Subtype Histology Text: there is a cup-shaped exoendophytic epithelial neoplasm characterized by proliferations of keratinocytes with abundant eosinophilic glossy cytoplasm and nuclei with open chromatin in the papillary and upper reticular dermis. Multiple inclusions containing elastic material are seen within the cytoplasm. The features are of squamous cell carcinoma, keratoacanthoma type.
Bi-Rhombic Flap Text: The defect edges were debeveled with a #15 scalpel blade. Given the location of the defect and the proximity to free margins a bi-rhombic flap was deemed most appropriate. Using a sterile surgical marker, an appropriate rhombic flap was drawn incorporating the defect. The area thus outlined was incised deep to adipose tissue with a #15 scalpel blade. The skin margins were undermined to an appropriate distance in all directions utilizing iris scissors.
O-L Flap Text: The defect edges were debeveled with a #15 scalpel blade. Given the location of the defect, shape of the defect and the proximity to free margins an O-L flap was deemed most appropriate. Using a sterile surgical marker, an appropriate advancement flap was drawn incorporating the defect and placing the expected incisions within the relaxed skin tension lines where possible. The area thus outlined was incised deep to adipose tissue with a #15 scalpel blade. The skin margins were undermined to an appropriate distance in all directions utilizing iris scissors.
Complex Repair And Graft Additional Text (Will Appearing After The Standard Complex Repair Text): The complex repair was not sufficient to completely close the primary defect. The remaining additional defect was repaired with the graft mentioned below.
Repair Type: Referred to plastics for closure
Mohs Method Verbiage: An incision at a 45 degree angle following the standard Mohs approach was done and the specimen was harvested as a microscopic controlled layer.
Estimated Blood Loss (Cc): minimal
Location Indication Override (Is Already Calculated Based On Selected Body Location): Area H
A-T Advancement Flap Text: The defect edges were debeveled with a #15 scalpel blade. Given the location of the defect, shape of the defect and the proximity to free margins an A-T advancement flap was deemed most appropriate. Using a sterile surgical marker, an appropriate advancement flap was drawn incorporating the defect and placing the expected incisions within the relaxed skin tension lines where possible. The area thus outlined was incised deep to adipose tissue with a #15 scalpel blade. The skin margins were undermined to an appropriate distance in all directions utilizing iris scissors.

## 2018-04-03 NOTE — PROCEDURE: REPAIR NOTE
Cheek Interpolation Flap Division And Inset Text: Division and inset of the cheek interpolation flap was performed to achieve optimal aesthetic result, restore normal anatomic appearance and avoid distortion of normal anatomy, expedite and facilitate wound healing, achieve optimal functional result and because linear closure either not possible or would produce suboptimal result. The patient was prepped and draped in the usual manner. The pedicle was infiltrated with local anesthesia. The pedicle was sectioned with a #15 blade. The pedicle was de-bulked and trimmed to match the shape of the defect. Hemostasis was achieved. The flap donor site and free margin of the flap were secured with deep buried sutures and the wound edges were re-approximated.
Star Wedge Flap Text: The defect edges were debeveled with a #15 scalpel blade. Given the location of the defect, shape of the defect and the proximity to free margins a star wedge flap was deemed most appropriate. Using a sterile surgical marker, an appropriate rotation flap was drawn incorporating the defect and placing the expected incisions within the relaxed skin tension lines where possible. The area thus outlined was incised deep to adipose tissue with a #15 scalpel blade. The skin margins were undermined to an appropriate distance in all directions utilizing iris scissors.
Interpolation Flap Text: A decision was made to reconstruct the defect utilizing an interpolation axial flap and a staged reconstruction. A telfa template was made of the defect. This telfa template was then used to outline the interpolation flap. The donor area for the pedicle flap was then injected with anesthesia. The flap was excised through the skin and subcutaneous tissue down to the layer of the underlying musculature. The interpolation flap was carefully excised within this deep plane to maintain its blood supply. The edges of the donor site were undermined. The donor site was closed in a primary fashion. The pedicle was then rotated into position and sutured. Once the tube was sutured into place, adequate blood supply was confirmed with blanching and refill. The pedicle was then wrapped with xeroform gauze and dressed appropriately with a telfa and gauze bandage to ensure continued blood supply and protect the attached pedicle.
Skin Substitute Units (Will Override Primary Defect Units If Greater Than 0): 0
H Plasty Text: Given the location of the defect, shape of the defect and the proximity to free margins a H-plasty was deemed most appropriate for repair. Using a sterile surgical marker, the appropriate advancement arms of the H-plasty were drawn incorporating the defect and placing the expected incisions within the relaxed skin tension lines where possible. The area thus outlined was incised deep to adipose tissue with a #15 scalpel blade. The skin margins were undermined to an appropriate distance in all directions utilizing iris scissors. The opposing advancement arms were then advanced into place in opposite direction and anchored with interrupted buried subcutaneous sutures.
Anesthesia Type: 1% lidocaine with epinephrine
O-L Flap Text: The defect edges were debeveled with a #15 scalpel blade. Given the location of the defect, shape of the defect and the proximity to free margins an O-L flap was deemed most appropriate. Using a sterile surgical marker, an appropriate advancement flap was drawn incorporating the defect and placing the expected incisions within the relaxed skin tension lines where possible. The area thus outlined was incised deep to adipose tissue with a #15 scalpel blade. The skin margins were undermined to an appropriate distance in all directions utilizing iris scissors.
Crescentic Complex Repair Preamble Text (Leave Blank If You Do Not Want): Extensive wide undermining was performed.
Island Pedicle Flap With Canthal Suspension Text: The defect edges were debeveled with a #15 scalpel blade. Given the location of the defect, shape of the defect and the proximity to free margins an island pedicle advancement flap was deemed most appropriate. Using a sterile surgical marker, an appropriate advancement flap was drawn incorporating the defect, outlining the appropriate donor tissue and placing the expected incisions within the relaxed skin tension lines where possible. The area thus outlined was incised deep to adipose tissue with a #15 scalpel blade. The skin margins were undermined to an appropriate distance in all directions around the primary defect and laterally outward around the island pedicle utilizing iris scissors. There was minimal undermining beneath the pedicle flap. A suspension suture was placed in the canthal tendon to prevent tension and prevent ectropion.
Bi-Rhombic Flap Text: The defect edges were debeveled with a #15 scalpel blade. Given the location of the defect and the proximity to free margins a bi-rhombic flap was deemed most appropriate. Using a sterile surgical marker, an appropriate rhombic flap was drawn incorporating the defect. The area thus outlined was incised deep to adipose tissue with a #15 scalpel blade. The skin margins were undermined to an appropriate distance in all directions utilizing iris scissors.
Referred To Oculoplastics For Closure Text (Leave Blank If You Do Not Want): After obtaining clear surgical margins the patient was sent to oculoplastics for surgical repair. The patient understands they will receive post-surgical care and follow-up from the referring physician's office.
Skin Substitute Text: The defect edges were debeveled with a #15 scalpel blade. Given the location of the defect, shape of the defect and the proximity to free margins a skin substitute graft was deemed most appropriate. The graft material was trimmed to fit the size of the defect. The graft was then placed in the primary defect and oriented appropriately.
Paramedian Forehead Flap Text: A decision was made to reconstruct the defect utilizing an interpolation axial flap and a staged reconstruction. A telfa template was made of the defect. This telfa template was then used to outline the paramedian forehead pedicle flap. The donor area for the pedicle flap was then injected with anesthesia. The flap was excised through the skin and subcutaneous tissue down to the layer of the underlying musculature. The pedicle flap was carefully excised within this deep plane to maintain its blood supply. The edges of the donor site were undermined. The donor site was closed in a primary fashion. The pedicle was then rotated into position and sutured. Once the tube was sutured into place, adequate blood supply was confirmed with blanching and refill. The pedicle was then wrapped with xeroform gauze and dressed appropriately with a telfa and gauze bandage to ensure continued blood supply and protect the attached pedicle.
Mucosal Advancement Flap Text: Given the location of the defect, shape of the defect and the proximity to free margins a mucosal advancement flap was deemed most appropriate. Incisions were made with a 15 blade scalpel in the appropriate fashion along the cutaneous vermilion border and the mucosal lip. The remaining actinically damaged mucosal tissue was excised. The mucosal advancement flap was then elevated to the gingival sulcus with care taken to preserve the neurovascular structures and advanced into the primary defect. Care was taken to ensure that precise realignment of the vermilion border was achieved.
Graft Donor Site Bandage (Optional-Leave Blank If You Don't Want In Note): Steri-strips and a pressure bandage were applied to the donor site.
W Plasty Text: The lesion was extirpated to the level of the fat with a #15 scalpel blade. Given the location of the defect, shape of the defect and the proximity to free margins a W-plasty was deemed most appropriate for repair. Using a sterile surgical marker, the appropriate transposition arms of the W-plasty were drawn incorporating the defect and placing the expected incisions within the relaxed skin tension lines where possible. The area thus outlined was incised deep to adipose tissue with a #15 scalpel blade. The skin margins were undermined to an appropriate distance in all directions utilizing iris scissors. The opposing transposition arms were then transposed into place in opposite direction and anchored with interrupted buried subcutaneous sutures.
V-Y Plasty Text: The defect edges were debeveled with a #15 scalpel blade. Given the location of the defect, shape of the defect and the proximity to free margins an V-Y advancement flap was deemed most appropriate. Using a sterile surgical marker, an appropriate advancement flap was drawn incorporating the defect and placing the expected incisions within the relaxed skin tension lines where possible. The area thus outlined was incised deep to adipose tissue with a #15 scalpel blade. The skin margins were undermined to an appropriate distance in all directions utilizing iris scissors.
Lazy S Intermediate Repair Preamble Text (Leave Blank If You Do Not Want): Undermining was performed with blunt dissection.
Secondary Intention Text (Leave Blank If You Do Not Want): The defect will heal with secondary intention.
Epidermal Closure Graft Donor Site (Optional): simple interrupted
Advancement Flap (Double) Text: The defect edges were debeveled with a #15 scalpel blade. Given the location of the defect and the proximity to free margins a double advancement flap was deemed most appropriate. Using a sterile surgical marker, the appropriate advancement flaps were drawn incorporating the defect and placing the expected incisions within the relaxed skin tension lines where possible. The area thus outlined was incised deep to adipose tissue with a #15 scalpel blade. The skin margins were undermined to an appropriate distance in all directions utilizing iris scissors.
Estimated Blood Loss (Cc): minimal
Tarsorrhaphy Performed?: No
Repair Type: Graft
Closure 3 Information: This tab is for additional flaps and grafts above and beyond our usual structured repairs. Please note if you enter information here it will not currently bill and you will need to add the billing information manually.
Manual Repair Warning Statement: We plan on removing the manually selected variable below in favor of our much easier automatic structured text blocks found in the previous tab. We decided to do this to help make the flow better and give you the full power of structured data. Manual selection is never going to be ideal in our platform and I would encourage you to avoid using manual selection from this point on, especially since I will be sunsetting this feature. It is important that you do one of two things with the customized text below. First, you can save all of the text in a word file so you can have it for future reference. Second, transfer the text to the appropriate area in the Library tab. Lastly, if there is a flap or graft type which we do not have you need to let us know right away so I can add it in before the variable is hidden. No need to panic, we plan to give you roughly 6 months to make the change.
Cheek-To-Nose Interpolation Flap Text: A decision was made to reconstruct the defect utilizing an interpolation axial flap and a staged reconstruction. A telfa template was made of the defect. This telfa template was then used to outline the Cheek-To-Nose Interpolation flap. The donor area for the pedicle flap was then injected with anesthesia. The flap was excised through the skin and subcutaneous tissue down to the layer of the underlying musculature. The interpolation flap was carefully excised within this deep plane to maintain its blood supply. The edges of the donor site were undermined. The donor site was closed in a primary fashion. The pedicle was then rotated into position and sutured. Once the tube was sutured into place, adequate blood supply was confirmed with blanching and refill. The pedicle was then wrapped with xeroform gauze and dressed appropriately with a telfa and gauze bandage to ensure continued blood supply and protect the attached pedicle.
Keystone Flap Text: The defect edges were debeveled with a #15 scalpel blade. Given the location of the defect, shape of the defect a keystone flap was deemed most appropriate. Using a sterile surgical marker, an appropriate keystone flap was drawn incorporating the defect, outlining the appropriate donor tissue and placing the expected incisions within the relaxed skin tension lines where possible. The area thus outlined was incised deep to adipose tissue with a #15 scalpel blade. The skin margins were undermined to an appropriate distance in all directions around the primary defect and laterally outward around the flap utilizing iris scissors.
Double Island Pedicle Flap Text: The defect edges were debeveled with a #15 scalpel blade. Given the location of the defect, shape of the defect and the proximity to free margins a double island pedicle advancement flap was deemed most appropriate. Using a sterile surgical marker, an appropriate advancement flap was drawn incorporating the defect, outlining the appropriate donor tissue and placing the expected incisions within the relaxed skin tension lines where possible. The area thus outlined was incised deep to adipose tissue with a #15 scalpel blade. The skin margins were undermined to an appropriate distance in all directions around the primary defect and laterally outward around the island pedicle utilizing iris scissors. There was minimal undermining beneath the pedicle flap.
Advancement Flap (Single) Text: The defect edges were debeveled with a #15 scalpel blade. Given the location of the defect and the proximity to free margins a single advancement flap was deemed most appropriate. Using a sterile surgical marker, an appropriate advancement flap was drawn incorporating the defect and placing the expected incisions within the relaxed skin tension lines where possible. The area thus outlined was incised deep to adipose tissue with a #15 scalpel blade. The skin margins were undermined to an appropriate distance in all directions utilizing iris scissors.
A-T Advancement Flap Text: The defect edges were debeveled with a #15 scalpel blade. Given the location of the defect, shape of the defect and the proximity to free margins an A-T advancement flap was deemed most appropriate. Using a sterile surgical marker, an appropriate advancement flap was drawn incorporating the defect and placing the expected incisions within the relaxed skin tension lines where possible. The area thus outlined was incised deep to adipose tissue with a #15 scalpel blade. The skin margins were undermined to an appropriate distance in all directions utilizing iris scissors.
Spiral Flap Text: The defect edges were debeveled with a #15 scalpel blade. Given the location of the defect, shape of the defect and the proximity to free margins a spiral flap was deemed most appropriate. Using a sterile surgical marker, an appropriate rotation flap was drawn incorporating the defect and placing the expected incisions within the relaxed skin tension lines where possible. The area thus outlined was incised deep to adipose tissue with a #15 scalpel blade. The skin margins were undermined to an appropriate distance in all directions utilizing iris scissors.
Full Thickness Lip Wedge Repair (Flap) Text: Given the location of the defect and the proximity to free margins a full thickness wedge repair was deemed most appropriate. Using a sterile surgical marker, the appropriate repair was drawn incorporating the defect and placing the expected incisions perpendicular to the vermilion border. The vermilion border was also meticulously outlined to ensure appropriate reapproximation during the repair. The area thus outlined was incised through and through with a #15 scalpel blade. The muscularis and dermis were reaproximated with deep sutures following hemostasis. Care was taken to realign the vermilion border before proceeding with the superficial closure. Once the vermilion was realigned the superfical and mucosal closure was finished.
Epidermal Sutures: 5-0 Prolene
Posterior Auricular Interpolation Flap Division And Inset Text: Division and inset of the posterior auricular interpolation flap was performed to achieve optimal aesthetic result, restore normal anatomic appearance and avoid distortion of normal anatomy, expedite and facilitate wound healing, achieve optimal functional result and because linear closure either not possible or would produce suboptimal result. The patient was prepped and draped in the usual manner. The pedicle was infiltrated with local anesthesia. The pedicle was sectioned with a #15 blade. The pedicle was de-bulked and trimmed to match the shape of the defect. Hemostasis was achieved. The flap donor site and free margin of the flap were secured with deep buried sutures and the wound edges were re-approximated.
Melolabial Interpolation Flap Text: A decision was made to reconstruct the defect utilizing an interpolation axial flap and a staged reconstruction. A telfa template was made of the defect. This telfa template was then used to outline the melolabial interpolation flap. The donor area for the pedicle flap was then injected with anesthesia. The flap was excised through the skin and subcutaneous tissue down to the layer of the underlying musculature. The pedicle flap was carefully excised within this deep plane to maintain its blood supply. The edges of the donor site were undermined. The donor site was closed in a primary fashion. The pedicle was then rotated into position and sutured. Once the tube was sutured into place, adequate blood supply was confirmed with blanching and refill. The pedicle was then wrapped with xeroform gauze and dressed appropriately with a telfa and gauze bandage to ensure continued blood supply and protect the attached pedicle.
Primary Defect Width (In Cm): 1.4
Post-Care Instructions: I reviewed with the patient in detail post-care instructions. Patient is not to engage in any heavy lifting, exercise, or swimming for the next 14 days. Should the patient develop any fevers, chills, bleeding, severe pain patient will contact the office immediately.
Xenograft Text: The defect edges were debeveled with a #15 scalpel blade. Given the location of the defect, shape of the defect and the proximity to free margins a xenograft was deemed most appropriate. The graft was then trimmed to fit the size of the defect. The graft was then placed in the primary defect and oriented appropriately.
Hatchet Flap Text: The defect edges were debeveled with a #15 scalpel blade. Given the location of the defect, shape of the defect and the proximity to free margins a hatchet flap was deemed most appropriate. Using a sterile surgical marker, an appropriate hatchet flap was drawn incorporating the defect and placing the expected incisions within the relaxed skin tension lines where possible. The area thus outlined was incised deep to adipose tissue with a #15 scalpel blade. The skin margins were undermined to an appropriate distance in all directions utilizing iris scissors.
Show Asc Variables: Yes
Cheek Interpolation Flap Text: A decision was made to reconstruct the defect utilizing an interpolation axial flap and a staged reconstruction. A telfa template was made of the defect. This telfa template was then used to outline the Cheek Interpolation flap. The donor area for the pedicle flap was then injected with anesthesia. The flap was excised through the skin and subcutaneous tissue down to the layer of the underlying musculature. The interpolation flap was carefully excised within this deep plane to maintain its blood supply. The edges of the donor site were undermined. The donor site was closed in a primary fashion. The pedicle was then rotated into position and sutured. Once the tube was sutured into place, adequate blood supply was confirmed with blanching and refill. The pedicle was then wrapped with xeroform gauze and dressed appropriately with a telfa and gauze bandage to ensure continued blood supply and protect the attached pedicle.
Hemostasis: Electrocautery
Referring Provider (Optional): Deirdre Amen
Referred To Otolaryngology For Closure Text (Leave Blank If You Do Not Want): After obtaining clear surgical margins the patient was sent to otolaryngology for surgical repair. The patient understands they will receive post-surgical care and follow-up from the referring physician's office.
O-T Advancement Flap Text: The defect edges were debeveled with a #15 scalpel blade. Given the location of the defect, shape of the defect and the proximity to free margins an O-T advancement flap was deemed most appropriate. Using a sterile surgical marker, an appropriate advancement flap was drawn incorporating the defect and placing the expected incisions within the relaxed skin tension lines where possible. The area thus outlined was incised deep to adipose tissue with a #15 scalpel blade. The skin margins were undermined to an appropriate distance in all directions utilizing iris scissors.
No Repair - Repaired With Adjacent Surgical Defect Text (Leave Blank If You Do Not Want): After obtaining clear surgical margins the defect was repaired concurrently with another surgical defect which was in close approximation.
Repair Performed By Another Provider Text (Leave Blank If You Do Not Want): After obtaining clear surgical margins the defect was repaired by another provider.
Referred To Plastics For Closure Text (Leave Blank If You Do Not Want): After obtaining clear surgical margins the patient was sent to plastics for surgical repair. The patient understands they will receive post-surgical care and follow-up from the referring physician's office.
Ftsg Text: The defect edges were debeveled with a #15 scalpel blade. Given the location of the defect, shape of the defect and the proximity to free margins a full thickness skin graft was deemed most appropriate. Using a sterile surgical marker, the primary defect shape was transferred to the donor site. The area thus outlined was incised deep to adipose tissue with a #15 scalpel blade. The harvested graft was then trimmed of adipose tissue until only dermis and epidermis was left. The skin margins of the secondary defect were undermined to an appropriate distance in all directions utilizing iris scissors. The secondary defect was closed with interrupted buried subcutaneous sutures. The skin edges were then re-apposed with running  sutures. The skin graft was then placed in the primary defect and oriented appropriately.
Cheek To Nose Interpolation Flap Division And Inset Text: Division and inset of the cheek to nose interpolation flap was performed to achieve optimal aesthetic result, restore normal anatomic appearance and avoid distortion of normal anatomy, expedite and facilitate wound healing, achieve optimal functional result and because linear closure either not possible or would produce suboptimal result. The patient was prepped and draped in the usual manner. The pedicle was infiltrated with local anesthesia. The pedicle was sectioned with a #15 blade. The pedicle was de-bulked and trimmed to match the shape of the defect. Hemostasis was achieved. The flap donor site and free margin of the flap were secured with deep buried sutures and the wound edges were re-approximated.
Detail Level: Detailed
Ear Wedge Repair Text: A wedge excision was completed by carrying down an excision through the full thickness of the ear and cartilage with an inward facing Burow's triangle. The wound was then closed in a layered fashion.
Rhombic Flap Text: The defect edges were debeveled with a #15 scalpel blade. Given the location of the defect and the proximity to free margins a rhombic flap was deemed most appropriate. Using a sterile surgical marker, an appropriate rhombic flap was drawn incorporating the defect. The area thus outlined was incised deep to adipose tissue with a #15 scalpel blade. The skin margins were undermined to an appropriate distance in all directions utilizing iris scissors.
Alar Island Pedicle Flap Text: The defect edges were debeveled with a #15 scalpel blade. Given the location of the defect, shape of the defect and the proximity to the alar rim an island pedicle advancement flap was deemed most appropriate. Using a sterile surgical marker, an appropriate advancement flap was drawn incorporating the defect, outlining the appropriate donor tissue and placing the expected incisions within the nasal ala running parallel to the alar rim. The area thus outlined was incised with a #15 scalpel blade. The skin margins were undermined minimally to an appropriate distance in all directions around the primary defect and laterally outward around the island pedicle utilizing iris scissors. There was minimal undermining beneath the pedicle flap.
Tissue Cultured Epidermal Autograft Text: The defect edges were debeveled with a #15 scalpel blade. Given the location of the defect, shape of the defect and the proximity to free margins a tissue cultured epidermal autograft was deemed most appropriate. The graft was then trimmed to fit the size of the defect. The graft was then placed in the primary defect and oriented appropriately.
Crescentic Advancement Flap Text: The defect edges were debeveled with a #15 scalpel blade. Given the location of the defect and the proximity to free margins a crescentic advancement flap was deemed most appropriate. Using a sterile surgical marker, the appropriate advancement flap was drawn incorporating the defect and placing the expected incisions within the relaxed skin tension lines where possible. The area thus outlined was incised deep to adipose tissue with a #15 scalpel blade. The skin margins were undermined to an appropriate distance in all directions utilizing iris scissors.
Rotation Flap Text: The defect edges were debeveled with a #15 scalpel blade. Given the location of the defect, shape of the defect and the proximity to free margins a rotation flap was deemed most appropriate. Using a sterile surgical marker, an appropriate rotation flap was drawn incorporating the defect and placing the expected incisions within the relaxed skin tension lines where possible. The area thus outlined was incised deep to adipose tissue with a #15 scalpel blade. The skin margins were undermined to an appropriate distance in all directions utilizing iris scissors.
Purse String (Intermediate) Text: Given the location of the defect and the characteristics of the surrounding skin a purse string intermediate closure was deemed most appropriate. Undermining was performed circumfirentially around the surgical defect. A purse string suture was then placed and tightened.
Consent: The rationale for the repair was explained to the patient and consent was obtained. The risks, benefits and alternatives to therapy were discussed in detail. Specifically, the risks of infection, scarring, bleeding, prolonged wound healing, incomplete removal, allergy to anesthesia, nerve injury and recurrence were addressed. Prior to the procedure, the treatment site was clearly identified and confirmed by the patient. All components of Universal Protocol/PAUSE Rule completed.
Graft Donor Site: left shoulder
Mastoid Interpolation Flap Division And Inset Text: Division and inset of the mastoid interpolation flap was performed to achieve optimal aesthetic result, restore normal anatomic appearance and avoid distortion of normal anatomy, expedite and facilitate wound healing, achieve optimal functional result and because linear closure either not possible or would produce suboptimal result. The patient was prepped and draped in the usual manner. The pedicle was infiltrated with local anesthesia. The pedicle was sectioned with a #15 blade. The pedicle was de-bulked and trimmed to match the shape of the defect. Hemostasis was achieved. The flap donor site and free margin of the flap were secured with deep buried sutures and the wound edges were re-approximated.
Mastoid Interpolation Flap Text: A decision was made to reconstruct the defect utilizing an interpolation axial flap and a staged reconstruction. A telfa template was made of the defect. This telfa template was then used to outline the mastoid interpolation flap. The donor area for the pedicle flap was then injected with anesthesia. The flap was excised through the skin and subcutaneous tissue down to the layer of the underlying musculature. The pedicle flap was carefully excised within this deep plane to maintain its blood supply. The edges of the donor site were undermined. The donor site was closed in a primary fashion. The pedicle was then rotated into position and sutured. Once the tube was sutured into place, adequate blood supply was confirmed with blanching and refill. The pedicle was then wrapped with xeroform gauze and dressed appropriately with a telfa and gauze bandage to ensure continued blood supply and protect the attached pedicle.
Cartilage Graft Text: The defect edges were debeveled with a #15 scalpel blade. Given the location of the defect, shape of the defect, the fact the defect involved a full thickness cartilage defect a cartilage graft was deemed most appropriate. An appropriate donor site was identified, cleansed, and anesthetized. The cartilage graft was then harvested and transferred to the recipient site, oriented appropriately and then sutured into place. The secondary defect was then repaired using a primary closure.
Bilobed Flap Text: The defect edges were debeveled with a #15 scalpel blade. Given the location of the defect and the proximity to free margins a bilobe flap was deemed most appropriate. Using a sterile surgical marker, an appropriate bilobe flap drawn around the defect. The area thus outlined was incised deep to adipose tissue with a #15 scalpel blade. The skin margins were undermined to an appropriate distance in all directions utilizing iris scissors.
Referred To Asc For Closure Text (Leave Blank If You Do Not Want): After obtaining clear surgical margins the patient was sent to an [facility] for surgical repair. The patient understands they will receive post-surgical care and follow-up from the [facility] physician.
Epidermal Autograft Text: The defect edges were debeveled with a #15 scalpel blade. Given the location of the defect, shape of the defect and the proximity to free margins an epidermal autograft was deemed most appropriate. Using a sterile surgical marker, the primary defect shape was transferred to the donor site. The epidermal graft was then harvested. The skin graft was then placed in the primary defect and oriented appropriately.
S Plasty Text: Given the location and shape of the defect, and the orientation of relaxed skin tension lines, an S-plasty was deemed most appropriate for repair. Using a sterile surgical marker, the appropriate outline of the S-plasty was drawn, incorporating the defect and placing the expected incisions within the relaxed skin tension lines where possible. The area thus outlined was incised deep to adipose tissue with a #15 scalpel blade. The skin margins were undermined to an appropriate distance in all directions utilizing iris scissors. The skin flaps were advanced over the defect. The opposing margins were then approximated with interrupted buried subcutaneous sutures.
Partial Purse String (Intermediate) Text: Given the location of the defect and the characteristics of the surrounding skin an intermediate purse string closure was deemed most appropriate. Undermining was performed circumfirentially around the surgical defect. A purse string suture was then placed and tightened. Wound tension only allowed a partial closure of the circular defect.
Partial Purse String (Simple) Text: Given the location of the defect and the characteristics of the surrounding skin a simple purse string closure was deemed most appropriate. Undermining was performed circumfirentially around the surgical defect. A purse string suture was then placed and tightened. Wound tension only allowed a partial closure of the circular defect.
Advancement-Rotation Flap Text: The defect edges were debeveled with a #15 scalpel blade. Given the location of the defect, shape of the defect and the proximity to free margins an advancement-rotation flap was deemed most appropriate. Using a sterile surgical marker, an appropriate flap was drawn incorporating the defect and placing the expected incisions within the relaxed skin tension lines where possible. The area thus outlined was incised deep to adipose tissue with a #15 scalpel blade. The skin margins were undermined to an appropriate distance in all directions utilizing iris scissors.
Cheiloplasty (Complex) Text: A decision was made to reconstruct the defect with a  cheiloplasty. The defect was undermined extensively. Additional obicularis oris muscle was excised with a 15 blade scalpel. The defect was converted into a full thickness wedge to facilite a better cosmetic result. Small vessels were then tied off with 5-0 monocyrl. The obicularis oris, superficial fascia, adipose and dermis were then reapproximated. After the deeper layers were approximated the epidermis was reapproximated with particular care given to realign the vermilion border.
Paramedian Forehead Flap Division And Inset Text: Division and inset of the paramedian forehead flap was performed to achieve optimal aesthetic result, restore normal anatomic appearance and avoid distortion of normal anatomy, expedite and facilitate wound healing, achieve optimal functional result and because linear closure either not possible or would produce suboptimal result. The patient was prepped and draped in the usual manner. The pedicle was infiltrated with local anesthesia. The pedicle was sectioned with a #15 blade. The pedicle was de-bulked and trimmed to match the shape of the defect. Hemostasis was achieved. The flap donor site and free margin of the flap were secured with deep buried sutures and the wound edges were re-approximated.
Melolabial Interpolation Flap Division And Inset Text: Division and inset of the melolabial interpolation flap was performed to achieve optimal aesthetic result, restore normal anatomic appearance and avoid distortion of normal anatomy, expedite and facilitate wound healing, achieve optimal functional result and because linear closure either not possible or would produce suboptimal result. The patient was prepped and draped in the usual manner. The pedicle was infiltrated with local anesthesia. The pedicle was sectioned with a #15 blade. The pedicle was de-bulked and trimmed to match the shape of the defect. Hemostasis was achieved. The flap donor site and free margin of the flap were secured with deep buried sutures and the wound edges were re-approximated.
Complex Repair And Graft Additional Text (Will Appearing After The Standard Complex Repair Text): The complex repair was not sufficient to completely close the primary defect. The remaining additional defect was repaired with the graft mentioned below.
Transposition Flap Text: The defect edges were debeveled with a #15 scalpel blade. Given the location of the defect and the proximity to free margins a transposition flap was deemed most appropriate. Using a sterile surgical marker, an appropriate transposition flap was drawn incorporating the defect. The area thus outlined was incised deep to adipose tissue with a #15 scalpel blade. The skin margins were undermined to an appropriate distance in all directions utilizing iris scissors.
Complex Repair And Flap Additional Text (Will Appearing After The Standard Complex Repair Text): The complex repair was not sufficient to completely close the primary defect. The remaining additional defect was repaired with the flap mentioned below.
Ear Star Wedge Flap Text: The defect edges were debeveled with a #15 blade scalpel. Given the location of the defect and the proximity to free margins (helical rim) an ear star wedge flap was deemed most appropriate. Using a sterile surgical marker, the appropriate flap was drawn incorporating the defect and placing the expected incisions between the helical rim and antihelix where possible. The area thus outlined was incised through and through with a #15 scalpel blade.
Island Pedicle Flap-Requiring Vessel Identification Text: The defect edges were debeveled with a #15 scalpel blade. Given the location of the defect, shape of the defect and the proximity to free margins an island pedicle advancement flap was deemed most appropriate. Using a sterile surgical marker, an appropriate advancement flap was drawn, based on the axial vessel mentioned above, incorporating the defect, outlining the appropriate donor tissue and placing the expected incisions within the relaxed skin tension lines where possible. The area thus outlined was incised deep to adipose tissue with a #15 scalpel blade. The skin margins were undermined to an appropriate distance in all directions around the primary defect and laterally outward around the island pedicle utilizing iris scissors. There was minimal undermining beneath the pedicle flap.
Composite Graft Text: The defect edges were debeveled with a #15 scalpel blade. Given the location of the defect, shape of the defect, the proximity to free margins and the fact the defect was full thickness a composite graft was deemed most appropriate. The defect was outline and then transferred to the donor site. A full thickness graft was then excised from the donor site. The graft was then placed in the primary defect, oriented appropriately and then sutured into place. The secondary defect was then repaired using a primary closure.
Deep Sutures: 5-0 Monocryl
Dorsal Nasal Flap Text: The defect edges were debeveled with a #15 scalpel blade. Given the location of the defect and the proximity to free margins a dorsal nasal flap was deemed most appropriate. Using a sterile surgical marker, an appropriate dorsal nasal flap was drawn around the defect. The area thus outlined was incised deep to adipose tissue with a #15 scalpel blade. The skin margins were undermined to an appropriate distance in all directions utilizing iris scissors.
Dermal Autograft Text: The defect edges were debeveled with a #15 scalpel blade. Given the location of the defect, shape of the defect and the proximity to free margins a dermal autograft was deemed most appropriate. Using a sterile surgical marker, the primary defect shape was transferred to the donor site. The area thus outlined was incised deep to adipose tissue with a #15 scalpel blade. The harvested graft was then trimmed of adipose and epidermal tissue until only dermis was left. The skin graft was then placed in the primary defect and oriented appropriately.
Burow's Advancement Flap Text: The defect edges were debeveled with a #15 scalpel blade. Given the location of the defect and the proximity to free margins a Burow's advancement flap was deemed most appropriate. Using a sterile surgical marker, the appropriate advancement flap was drawn incorporating the defect and placing the expected incisions within the relaxed skin tension lines where possible. The area thus outlined was incised deep to adipose tissue with a #15 scalpel blade. The skin margins were undermined to an appropriate distance in all directions utilizing iris scissors.
Melolabial Transposition Flap Text: The defect edges were debeveled with a #15 scalpel blade. Given the location of the defect and the proximity to free margins a melolabial flap was deemed most appropriate. Using a sterile surgical marker, an appropriate melolabial transposition flap was drawn incorporating the defect. The area thus outlined was incised deep to adipose tissue with a #15 scalpel blade. The skin margins were undermined to an appropriate distance in all directions utilizing iris scissors.
Helical Rim Advancement Flap Text: The defect edges were debeveled with a #15 blade scalpel. Given the location of the defect and the proximity to free margins (helical rim) a double helical rim advancement flap was deemed most appropriate. Using a sterile surgical marker, the appropriate advancement flaps were drawn incorporating the defect and placing the expected incisions between the helical rim and antihelix where possible. The area thus outlined was incised through and through with a #15 scalpel blade. With a skin hook and iris scissors, the flaps were gently and sharply undermined and freed up.
Posterior Auricular Interpolation Flap Text: A decision was made to reconstruct the defect utilizing an interpolation axial flap and a staged reconstruction. A telfa template was made of the defect. This telfa template was then used to outline the posterior auricular interpolation flap. The donor area for the pedicle flap was then injected with anesthesia. The flap was excised through the skin and subcutaneous tissue down to the layer of the underlying musculature. The pedicle flap was carefully excised within this deep plane to maintain its blood supply. The edges of the donor site were undermined. The donor site was closed in a primary fashion. The pedicle was then rotated into position and sutured. Once the tube was sutured into place, adequate blood supply was confirmed with blanching and refill. The pedicle was then wrapped with xeroform gauze and dressed appropriately with a telfa and gauze bandage to ensure continued blood supply and protect the attached pedicle.
Z Plasty Text: The lesion was extirpated to the level of the fat with a #15 scalpel blade. Given the location of the defect, shape of the defect and the proximity to free margins a Z-plasty was deemed most appropriate for repair. Using a sterile surgical marker, the appropriate transposition arms of the Z-plasty were drawn incorporating the defect and placing the expected incisions within the relaxed skin tension lines where possible. The area thus outlined was incised deep to adipose tissue with a #15 scalpel blade. The skin margins were undermined to an appropriate distance in all directions utilizing iris scissors. The opposing transposition arms were then transposed into place in opposite direction and anchored with interrupted buried subcutaneous sutures.
Dressing: xeroform gauze
Cheiloplasty (Less Than 50%) Text: A decision was made to reconstruct the defect with a  cheiloplasty. The defect was undermined extensively. Additional obicularis oris muscle was excised with a 15 blade scalpel. The defect was converted into a full thickness wedge, of less than 50% of the vertical height of the lip, to facilite a better cosmetic result. Small vessels were then tied off with 5-0 monocyrl. The obicularis oris, superficial fascia, adipose and dermis were then reapproximated. After the deeper layers were approximated the epidermis was reapproximated with particular care given to realign the vermilion border.
Modified Advancement Flap Text: The defect edges were debeveled with a #15 scalpel blade. Given the location of the defect, shape of the defect and the proximity to free margins a modified advancement flap was deemed most appropriate. Using a sterile surgical marker, an appropriate advancement flap was drawn incorporating the defect and placing the expected incisions within the relaxed skin tension lines where possible. The area thus outlined was incised deep to adipose tissue with a #15 scalpel blade. The skin margins were undermined to an appropriate distance in all directions utilizing iris scissors.
Graft Type: Split Thickness Skin Graft
Additional Anesthesia Volume In Cc: 6
Trilobed Flap Text: The defect edges were debeveled with a #15 scalpel blade. Given the location of the defect and the proximity to free margins a trilobed flap was deemed most appropriate. Using a sterile surgical marker, an appropriate trilobed flap drawn around the defect. The area thus outlined was incised deep to adipose tissue with a #15 scalpel blade. The skin margins were undermined to an appropriate distance in all directions utilizing iris scissors.
O-T Plasty Text: The defect edges were debeveled with a #15 scalpel blade. Given the location of the defect, shape of the defect and the proximity to free margins an O-T plasty was deemed most appropriate. Using a sterile surgical marker, an appropriate O-T plasty was drawn incorporating the defect and placing the expected incisions within the relaxed skin tension lines where possible. The area thus outlined was incised deep to adipose tissue with a #15 scalpel blade. The skin margins were undermined to an appropriate distance in all directions utilizing iris scissors.
O-Z Plasty Text: The defect edges were debeveled with a #15 scalpel blade. Given the location of the defect, shape of the defect and the proximity to free margins an O-Z plasty (double transposition flap) was deemed most appropriate. Using a sterile surgical marker, the appropriate transposition flaps were drawn incorporating the defect and placing the expected incisions within the relaxed skin tension lines where possible. The area thus outlined was incised deep to adipose tissue with a #15 scalpel blade. The skin margins were undermined to an appropriate distance in all directions utilizing iris scissors. Hemostasis was achieved with electrocautery. The flaps were then transposed into place, one clockwise and the other counterclockwise, and anchored with interrupted buried subcutaneous sutures.
Anesthesia Volume In Cc: 3
Island Pedicle Flap Text: The defect edges were debeveled with a #15 scalpel blade. Given the location of the defect, shape of the defect and the proximity to free margins an island pedicle advancement flap was deemed most appropriate. Using a sterile surgical marker, an appropriate advancement flap was drawn incorporating the defect, outlining the appropriate donor tissue and placing the expected incisions within the relaxed skin tension lines where possible. The area thus outlined was incised deep to adipose tissue with a #15 scalpel blade. The skin margins were undermined to an appropriate distance in all directions around the primary defect and laterally outward around the island pedicle utilizing iris scissors. There was minimal undermining beneath the pedicle flap.
Purse String (Simple) Text: Given the location of the defect and the characteristics of the surrounding skin a purse string closure was deemed most appropriate. Undermining was performed circumfirentially around the surgical defect. A purse string suture was then placed and tightened.
Bilateral Helical Rim Advancement Flap Text: The defect edges were debeveled with a #15 blade scalpel. Given the location of the defect and the proximity to free margins (helical rim) a bilateral helical rim advancement flap was deemed most appropriate. Using a sterile surgical marker, the appropriate advancement flaps were drawn incorporating the defect and placing the expected incisions between the helical rim and antihelix where possible. The area thus outlined was incised through and through with a #15 scalpel blade. With a skin hook and iris scissors, the flaps were gently and sharply undermined and freed up.
Localized Dermabrasion Text: The patient was draped in routine manner. Localized dermabrasion using 3 x 17 mm wire brush was performed in routine manner to papillary dermis. This spot dermabrasion is being performed to complete skin cancer reconstruction. It also will eliminate the other sun damaged precancerous cells that are known to be part of the regional effect of a lifetime's worth of sun exposure. This localized dermabrasion is therapeutic and should not be considered cosmetic in any regard.
Muscle Hinge Flap Text: The defect edges were debeveled with a #15 scalpel blade. Given the size, depth and location of the defect and the proximity to free margins a muscle hinge flap was deemed most appropriate. Using a sterile surgical marker, an appropriate hinge flap was drawn incorporating the defect. The area thus outlined was incised with a #15 scalpel blade. The skin margins were undermined to an appropriate distance in all directions utilizing iris scissors.
Bilobed Transposition Flap Text: The defect edges were debeveled with a #15 scalpel blade. Given the location of the defect and the proximity to free margins a bilobed transposition flap was deemed most appropriate. Using a sterile surgical marker, an appropriate bilobe flap drawn around the defect. The area thus outlined was incised deep to adipose tissue with a #15 scalpel blade. The skin margins were undermined to an appropriate distance in all directions utilizing iris scissors.
Suture Removal: 7 days
V-Y Flap Text: The defect edges were debeveled with a #15 scalpel blade. Given the location of the defect, shape of the defect and the proximity to free margins a V-Y flap was deemed most appropriate. Using a sterile surgical marker, an appropriate advancement flap was drawn incorporating the defect and placing the expected incisions within the relaxed skin tension lines where possible. The area thus outlined was incised deep to adipose tissue with a #15 scalpel blade. The skin margins were undermined to an appropriate distance in all directions utilizing iris scissors.
Tarsorrhaphy Text: A tarsorrhaphy was performed using Frost sutures.
Closure 2 Information: This tab is for additional flaps and grafts, including complex repair and grafts and complex repair and flaps. You can also specify a different location for the additional defect, if the location is the same you do not need to select a new one. We will insert the automated text for the repair you select below just as we do for solitary flaps and grafts. Please note that at this time if you select a location with a different insurance zone you will need to override the ICD10 and CPT if appropriate.
Referred To Mid-Level For Closure Text (Leave Blank If You Do Not Want): After obtaining clear surgical margins the patient was sent to a mid-level provider for surgical repair. The patient understands they will receive post-surgical care and follow-up from the mid-level provider.
Closure 4 Information: This tab is for additional flaps and grafts above and beyond our usual structured repairs.  Please note if you enter information here it will not currently bill and you will need to add the billing information manually.
Split-Thickness Skin Graft Text: The defect edges were debeveled with a #15 scalpel blade. Given the location of the defect, shape of the defect and the proximity to free margins a split thickness skin graft was deemed most appropriate. Using a sterile surgical marker, the primary defect shape was transferred to the donor site. The split thickness graft was then harvested. The skin graft was then placed in the primary defect and oriented appropriately.

## 2018-04-10 ENCOUNTER — APPOINTMENT (RX ONLY)
Dept: URBAN - METROPOLITAN AREA CLINIC 121 | Facility: CLINIC | Age: 68
Setting detail: DERMATOLOGY
End: 2018-04-10

## 2018-04-10 DIAGNOSIS — Z48.817 ENCOUNTER FOR SURGICAL AFTERCARE FOLLOWING SURGERY ON THE SKIN AND SUBCUTANEOUS TISSUE: ICD-10-CM

## 2018-04-10 PROCEDURE — 99024 POSTOP FOLLOW-UP VISIT: CPT

## 2018-04-10 PROCEDURE — ? POST-OP WOUND CHECK

## 2018-04-10 ASSESSMENT — LOCATION DETAILED DESCRIPTION DERM: LOCATION DETAILED: LEFT CAVUM CONCHA

## 2018-04-10 ASSESSMENT — LOCATION ZONE DERM: LOCATION ZONE: EAR

## 2018-04-10 ASSESSMENT — LOCATION SIMPLE DESCRIPTION DERM: LOCATION SIMPLE: LEFT EAR

## 2018-04-10 NOTE — PROCEDURE: POST-OP WOUND CHECK
Add 38489 Cpt? (Important Note: In 2017 The Use Of 93711 Is Being Tracked By Cms To Determine Future Global Period Reimbursement For Global Periods): yes
Wound Evaluated By: Dr. Mariam Ramírez
Detail Level: Detailed

## 2018-04-24 ENCOUNTER — APPOINTMENT (RX ONLY)
Dept: URBAN - METROPOLITAN AREA CLINIC 121 | Facility: CLINIC | Age: 68
Setting detail: DERMATOLOGY
End: 2018-04-24

## 2018-04-24 DIAGNOSIS — Z48.817 ENCOUNTER FOR SURGICAL AFTERCARE FOLLOWING SURGERY ON THE SKIN AND SUBCUTANEOUS TISSUE: ICD-10-CM

## 2018-04-24 PROCEDURE — ? POST-OP WOUND CHECK

## 2018-04-24 ASSESSMENT — LOCATION DETAILED DESCRIPTION DERM: LOCATION DETAILED: LEFT CRUS OF HELIX

## 2018-04-24 ASSESSMENT — LOCATION ZONE DERM: LOCATION ZONE: EAR

## 2018-04-24 ASSESSMENT — LOCATION SIMPLE DESCRIPTION DERM: LOCATION SIMPLE: LEFT EAR

## 2018-04-24 NOTE — PROCEDURE: POST-OP WOUND CHECK
Add 78114 Cpt? (Important Note: In 2017 The Use Of 93506 Is Being Tracked By Cms To Determine Future Global Period Reimbursement For Global Periods): no
Detail Level: Detailed

## 2018-08-31 ENCOUNTER — APPOINTMENT (OUTPATIENT)
Dept: URGENT CARE | Facility: PHYSICIAN GROUP | Age: 68
End: 2018-08-31

## 2019-08-27 ENCOUNTER — APPOINTMENT (RX ONLY)
Dept: URBAN - METROPOLITAN AREA CLINIC 121 | Facility: CLINIC | Age: 69
Setting detail: DERMATOLOGY
End: 2019-08-27

## 2019-08-27 DIAGNOSIS — L57.0 ACTINIC KERATOSIS: ICD-10-CM

## 2019-08-27 DIAGNOSIS — D485 NEOPLASM OF UNCERTAIN BEHAVIOR OF SKIN: ICD-10-CM

## 2019-08-27 PROBLEM — D48.5 NEOPLASM OF UNCERTAIN BEHAVIOR OF SKIN: Status: ACTIVE | Noted: 2019-08-27

## 2019-08-27 PROCEDURE — ? BIOPSY BY SHAVE METHOD

## 2019-08-27 PROCEDURE — 11102 TANGNTL BX SKIN SINGLE LES: CPT

## 2019-08-27 PROCEDURE — 17003 DESTRUCT PREMALG LES 2-14: CPT

## 2019-08-27 PROCEDURE — ? LIQUID NITROGEN

## 2019-08-27 PROCEDURE — 17000 DESTRUCT PREMALG LESION: CPT | Mod: 59

## 2019-08-27 ASSESSMENT — LOCATION DETAILED DESCRIPTION DERM
LOCATION DETAILED: LEFT LATERAL MANDIBULAR CHEEK
LOCATION DETAILED: LEFT NASAL ROOT
LOCATION DETAILED: LEFT SUPERIOR LATERAL NECK
LOCATION DETAILED: LEFT CENTRAL LATERAL NECK

## 2019-08-27 ASSESSMENT — LOCATION SIMPLE DESCRIPTION DERM
LOCATION SIMPLE: LEFT CHEEK
LOCATION SIMPLE: NECK
LOCATION SIMPLE: NOSE

## 2019-08-27 ASSESSMENT — LOCATION ZONE DERM
LOCATION ZONE: FACE
LOCATION ZONE: NOSE
LOCATION ZONE: NECK

## 2019-08-27 NOTE — PROCEDURE: BIOPSY BY SHAVE METHOD
Dressing: bandage
Anesthesia Volume In Cc (Will Not Render If 0): 1.5
Consent: Written consent was obtained and risks were reviewed including but not limited to scarring, infection, bleeding, scabbing, incomplete removal, nerve damage and allergy to anesthesia.
Type Of Destruction Used: Curettage
Electrodesiccation And Curettage Text: The wound bed was treated with electrodesiccation and curettage after the biopsy was performed.
Hemostasis: Aluminum Chloride and Electrocautery
Render In Bullet Format When Appropriate: No
Was A Bandage Applied: Yes
Detail Level: Detailed
Silver Nitrate Text: The wound bed was treated with silver nitrate after the biopsy was performed.
Biopsy Method: Personna blade
Anesthesia Type: 1% lidocaine without epinephrine
Cryotherapy Text: The wound bed was treated with cryotherapy after the biopsy was performed.
Post-Care Instructions: I reviewed with the patient in detail post-care instructions. Patient is to keep the biopsy site dry overnight, and then apply bacitracin twice daily until healed. Patient may apply hydrogen peroxide soaks to remove any crusting.
Lab Facility: [address]
Lab: [facility]
Size Of Lesion In Cm: 0
Billing Type: [employer]
Notification Instructions: Patient will be notified of biopsy results. However, patient instructed to call the office if not contacted within 2 weeks.
Electrodesiccation Text: The wound bed was treated with electrodesiccation after the biopsy was performed.
Biopsy Type: H and E
Wound Care: Vaseline
Depth Of Biopsy: dermis

## 2019-09-06 ENCOUNTER — APPOINTMENT (RX ONLY)
Dept: URBAN - METROPOLITAN AREA CLINIC 121 | Facility: CLINIC | Age: 69
Setting detail: DERMATOLOGY
End: 2019-09-06

## 2019-09-06 DIAGNOSIS — Z01.818 ENCOUNTER FOR OTHER PREPROCEDURAL EXAMINATION: ICD-10-CM

## 2019-09-06 PROCEDURE — ? COUNSELING

## 2019-09-11 ENCOUNTER — APPOINTMENT (RX ONLY)
Dept: URBAN - METROPOLITAN AREA CLINIC 116 | Facility: CLINIC | Age: 69
Setting detail: DERMATOLOGY
End: 2019-09-11

## 2019-09-11 VITALS — HEART RATE: 72 BPM | SYSTOLIC BLOOD PRESSURE: 147 MMHG | DIASTOLIC BLOOD PRESSURE: 75 MMHG

## 2019-09-11 PROBLEM — C44.311 BASAL CELL CARCINOMA OF SKIN OF NOSE: Status: ACTIVE | Noted: 2019-09-11

## 2019-09-11 PROCEDURE — ? REPAIR NOTE

## 2019-09-11 PROCEDURE — 17312 MOHS ADDL STAGE: CPT

## 2019-09-11 PROCEDURE — 15120 SPLT AGRFT F/S/N/H/F/G/M 1ST: CPT

## 2019-09-11 PROCEDURE — 17311 MOHS 1 STAGE H/N/HF/G: CPT

## 2019-09-11 PROCEDURE — ? MOHS SURGERY

## 2019-09-11 NOTE — PROCEDURE: MIPS QUALITY
Additional Notes: 0 pain level
Detail Level: Generalized
Quality 265: Biopsy Follow-Up: Biopsy results reviewed, communicated, tracked, and documented
Quality 130: Documentation Of Current Medications In The Medical Record: Current Medications Documented
Quality 131: Pain Assessment And Follow-Up: No documentation of pain assessment, reason not given

## 2019-09-11 NOTE — PROCEDURE: REPAIR NOTE
Double Island Pedicle Flap Text: The defect edges were debeveled with a #15 scalpel blade. Given the location of the defect, shape of the defect and the proximity to free margins a double island pedicle advancement flap was deemed most appropriate. Using a sterile surgical marker, an appropriate advancement flap was drawn incorporating the defect, outlining the appropriate donor tissue and placing the expected incisions within the relaxed skin tension lines where possible. The area thus outlined was incised deep to adipose tissue with a #15 scalpel blade. The skin margins were undermined to an appropriate distance in all directions around the primary defect and laterally outward around the island pedicle utilizing iris scissors. There was minimal undermining beneath the pedicle flap.
Posterior Auricular Interpolation Flap Text: A decision was made to reconstruct the defect utilizing an interpolation axial flap and a staged reconstruction. A telfa template was made of the defect. This telfa template was then used to outline the posterior auricular interpolation flap. The donor area for the pedicle flap was then injected with anesthesia. The flap was excised through the skin and subcutaneous tissue down to the layer of the underlying musculature. The pedicle flap was carefully excised within this deep plane to maintain its blood supply. The edges of the donor site were undermined. The donor site was closed in a primary fashion. The pedicle was then rotated into position and sutured. Once the tube was sutured into place, adequate blood supply was confirmed with blanching and refill. The pedicle was then wrapped with xeroform gauze and dressed appropriately with a telfa and gauze bandage to ensure continued blood supply and protect the attached pedicle.
Rotation Flap Text: The defect edges were debeveled with a #15 scalpel blade. Given the location of the defect, shape of the defect and the proximity to free margins a rotation flap was deemed most appropriate. Using a sterile surgical marker, an appropriate rotation flap was drawn incorporating the defect and placing the expected incisions within the relaxed skin tension lines where possible. The area thus outlined was incised deep to adipose tissue with a #15 scalpel blade. The skin margins were undermined to an appropriate distance in all directions utilizing iris scissors.
Suturegard Body: The suture ends were repeatedly re-tightened and re-clamped to achieve the desired tissue expansion.
Lazy S Complex Repair Preamble Text (Leave Blank If You Do Not Want): Extensive wide undermining was performed.
Graft Donor Site Will Heal By Secondary Intention: No
Repair Performed By Another Provider Text (Leave Blank If You Do Not Want): After obtaining clear surgical margins the defect was repaired by another provider.
Suturegard Intro: Intraoperative tissue expansion was performed, utilizing the SUTUREGARD device, in order to reduce wound tension.
Xenograft Text: The defect edges were debeveled with a #15 scalpel blade. Given the location of the defect, shape of the defect and the proximity to free margins a xenograft was deemed most appropriate. The graft was then trimmed to fit the size of the defect. The graft was then placed in the primary defect and oriented appropriately.
Spiral Flap Text: The defect edges were debeveled with a #15 scalpel blade. Given the location of the defect, shape of the defect and the proximity to free margins a spiral flap was deemed most appropriate. Using a sterile surgical marker, an appropriate rotation flap was drawn incorporating the defect and placing the expected incisions within the relaxed skin tension lines where possible. The area thus outlined was incised deep to adipose tissue with a #15 scalpel blade. The skin margins were undermined to an appropriate distance in all directions utilizing iris scissors.
Island Pedicle Flap-Requiring Vessel Identification Text: The defect edges were debeveled with a #15 scalpel blade. Given the location of the defect, shape of the defect and the proximity to free margins an island pedicle advancement flap was deemed most appropriate. Using a sterile surgical marker, an appropriate advancement flap was drawn, based on the axial vessel mentioned above, incorporating the defect, outlining the appropriate donor tissue and placing the expected incisions within the relaxed skin tension lines where possible. The area thus outlined was incised deep to adipose tissue with a #15 scalpel blade. The skin margins were undermined to an appropriate distance in all directions around the primary defect and laterally outward around the island pedicle utilizing iris scissors. There was minimal undermining beneath the pedicle flap.
Validate Note Data (See Information Below): Yes
Advancement Flap (Single) Text: The defect edges were debeveled with a #15 scalpel blade. Given the location of the defect and the proximity to free margins a single advancement flap was deemed most appropriate. Using a sterile surgical marker, an appropriate advancement flap was drawn incorporating the defect and placing the expected incisions within the relaxed skin tension lines where possible. The area thus outlined was incised deep to adipose tissue with a #15 scalpel blade. The skin margins were undermined to an appropriate distance in all directions utilizing iris scissors.
Skin Substitute: EpiFix Micronized
Cheek Interpolation Flap Division And Inset Text: Division and inset of the cheek interpolation flap was performed to achieve optimal aesthetic result, restore normal anatomic appearance and avoid distortion of normal anatomy, expedite and facilitate wound healing, achieve optimal functional result and because linear closure either not possible or would produce suboptimal result. The patient was prepped and draped in the usual manner. The pedicle was infiltrated with local anesthesia. The pedicle was sectioned with a #15 blade. The pedicle was de-bulked and trimmed to match the shape of the defect. Hemostasis was achieved. The flap donor site and free margin of the flap were secured with deep buried sutures and the wound edges were re-approximated.
Paramedian Forehead Flap Text: A decision was made to reconstruct the defect utilizing an interpolation axial flap and a staged reconstruction. A telfa template was made of the defect. This telfa template was then used to outline the paramedian forehead pedicle flap. The donor area for the pedicle flap was then injected with anesthesia. The flap was excised through the skin and subcutaneous tissue down to the layer of the underlying musculature. The pedicle flap was carefully excised within this deep plane to maintain its blood supply. The edges of the donor site were undermined. The donor site was closed in a primary fashion. The pedicle was then rotated into position and sutured. Once the tube was sutured into place, adequate blood supply was confirmed with blanching and refill. The pedicle was then wrapped with xeroform gauze and dressed appropriately with a telfa and gauze bandage to ensure continued blood supply and protect the attached pedicle.
Purse String (Simple) Text: Given the location of the defect and the characteristics of the surrounding skin a purse string closure was deemed most appropriate. Undermining was performed circumfirentially around the surgical defect. A purse string suture was then placed and tightened.
Keystone Flap Text: The defect edges were debeveled with a #15 scalpel blade. Given the location of the defect, shape of the defect a keystone flap was deemed most appropriate. Using a sterile surgical marker, an appropriate keystone flap was drawn incorporating the defect, outlining the appropriate donor tissue and placing the expected incisions within the relaxed skin tension lines where possible. The area thus outlined was incised deep to adipose tissue with a #15 scalpel blade. The skin margins were undermined to an appropriate distance in all directions around the primary defect and laterally outward around the flap utilizing iris scissors.
Cheek To Nose Interpolation Flap Division And Inset Text: Division and inset of the cheek to nose interpolation flap was performed to achieve optimal aesthetic result, restore normal anatomic appearance and avoid distortion of normal anatomy, expedite and facilitate wound healing, achieve optimal functional result and because linear closure either not possible or would produce suboptimal result. The patient was prepped and draped in the usual manner. The pedicle was infiltrated with local anesthesia. The pedicle was sectioned with a #15 blade. The pedicle was de-bulked and trimmed to match the shape of the defect. Hemostasis was achieved. The flap donor site and free margin of the flap were secured with deep buried sutures and the wound edges were re-approximated.
Suture Removal: 7 days
Length To Time In Minutes Device Was In Place: 10
Epidermal Closure: none-secondary intention
Advancement Flap (Double) Text: The defect edges were debeveled with a #15 scalpel blade. Given the location of the defect and the proximity to free margins a double advancement flap was deemed most appropriate. Using a sterile surgical marker, the appropriate advancement flaps were drawn incorporating the defect and placing the expected incisions within the relaxed skin tension lines where possible. The area thus outlined was incised deep to adipose tissue with a #15 scalpel blade. The flaps were then raised and mobilized until then could be advanced towards each other. The skin margins were undermined to an appropriate distance in all directions utilizing iris scissors.
Cheiloplasty (Less Than 50%) Text: A decision was made to reconstruct the defect with a  cheiloplasty. The defect was undermined extensively. Additional obicularis oris muscle was excised with a 15 blade scalpel. The defect was converted into a full thickness wedge, of less than 50% of the vertical height of the lip, to facilite a better cosmetic result. Small vessels were then tied off with 5-0 monocyrl. The obicularis oris, superficial fascia, adipose and dermis were then reapproximated. After the deeper layers were approximated the epidermis was reapproximated with particular care given to realign the vermilion border.
Purse String (Intermediate) Text: Given the location of the defect and the characteristics of the surrounding skin a purse string intermediate closure was deemed most appropriate. Undermining was performed circumfirentially around the surgical defect. A purse string suture was then placed and tightened.
Skin Substitute Units (Required For Skin Substitute Paste Or Injection): 0
O-T Plasty Text: The defect edges were debeveled with a #15 scalpel blade. Given the location of the defect, shape of the defect and the proximity to free margins an O-T plasty was deemed most appropriate. Using a sterile surgical marker, an appropriate O-T plasty was drawn incorporating the defect and placing the expected incisions within the relaxed skin tension lines where possible. The area thus outlined was incised deep to adipose tissue with a #15 scalpel blade. The skin margins were undermined to an appropriate distance in all directions utilizing iris scissors.
Cheiloplasty (Complex) Text: A decision was made to reconstruct the defect with a  cheiloplasty. The defect was undermined extensively. Additional obicularis oris muscle was excised with a 15 blade scalpel. The defect was converted into a full thickness wedge to facilite a better cosmetic result. Small vessels were then tied off with 5-0 monocyrl. The obicularis oris, superficial fascia, adipose and dermis were then reapproximated. After the deeper layers were approximated the epidermis was reapproximated with particular care given to realign the vermilion border.
Transposition Flap Text: The defect edges were debeveled with a #15 scalpel blade. Given the location of the defect and the proximity to free margins a transposition flap was deemed most appropriate. Using a sterile surgical marker, an appropriate transposition flap was drawn incorporating the defect. The area thus outlined was incised deep to adipose tissue with a #15 scalpel blade. The skin margins were undermined to an appropriate distance in all directions utilizing iris scissors.
Partial Purse String (Simple) Text: Given the location of the defect and the characteristics of the surrounding skin a simple purse string closure was deemed most appropriate. Undermining was performed circumfirentially around the surgical defect. A purse string suture was then placed and tightened. Wound tension only allowed a partial closure of the circular defect.
Crescentic Intermediate Repair Preamble Text (Leave Blank If You Do Not Want): Undermining was performed with blunt dissection.
Melolabial Interpolation Flap Division And Inset Text: Division and inset of the melolabial interpolation flap was performed to achieve optimal aesthetic result, restore normal anatomic appearance and avoid distortion of normal anatomy, expedite and facilitate wound healing, achieve optimal functional result and because linear closure either not possible or would produce suboptimal result. The patient was prepped and draped in the usual manner. The pedicle was infiltrated with local anesthesia. The pedicle was sectioned with a #15 blade. The pedicle was de-bulked and trimmed to match the shape of the defect. Hemostasis was achieved. The flap donor site and free margin of the flap were secured with deep buried sutures and the wound edges were re-approximated.
Burow's Advancement Flap Text: The defect edges were debeveled with a #15 scalpel blade. Given the location of the defect and the proximity to free margins a Burow's advancement flap was deemed most appropriate. Using a sterile surgical marker, the appropriate advancement flap was drawn incorporating the defect and placing the expected incisions within the relaxed skin tension lines where possible. The area thus outlined was incised deep to adipose tissue with a #15 scalpel blade. The skin margins were undermined to an appropriate distance in all directions utilizing iris scissors.
Star Wedge Flap Text: The defect edges were debeveled with a #15 scalpel blade. Given the location of the defect, shape of the defect and the proximity to free margins a star wedge flap was deemed most appropriate. Using a sterile surgical marker, an appropriate rotation flap was drawn incorporating the defect and placing the expected incisions within the relaxed skin tension lines where possible. The area thus outlined was incised deep to adipose tissue with a #15 scalpel blade. The skin margins were undermined to an appropriate distance in all directions utilizing iris scissors.
Muscle Hinge Flap Text: The defect edges were debeveled with a #15 scalpel blade. Given the size, depth and location of the defect and the proximity to free margins a muscle hinge flap was deemed most appropriate. Using a sterile surgical marker, an appropriate hinge flap was drawn incorporating the defect. The area thus outlined was incised with a #15 scalpel blade. The skin margins were undermined to an appropriate distance in all directions utilizing iris scissors.
O-Z Plasty Text: The defect edges were debeveled with a #15 scalpel blade. Given the location of the defect, shape of the defect and the proximity to free margins an O-Z plasty (double transposition flap) was deemed most appropriate. Using a sterile surgical marker, the appropriate transposition flaps were drawn incorporating the defect and placing the expected incisions within the relaxed skin tension lines where possible. The area thus outlined was incised deep to adipose tissue with a #15 scalpel blade. The skin margins were undermined to an appropriate distance in all directions utilizing iris scissors. Hemostasis was achieved with electrocautery. The flaps were then transposed into place, one clockwise and the other counterclockwise, and anchored with interrupted buried subcutaneous sutures.
Mastoid Interpolation Flap Division And Inset Text: Division and inset of the mastoid interpolation flap was performed to achieve optimal aesthetic result, restore normal anatomic appearance and avoid distortion of normal anatomy, expedite and facilitate wound healing, achieve optimal functional result and because linear closure either not possible or would produce suboptimal result. The patient was prepped and draped in the usual manner. The pedicle was infiltrated with local anesthesia. The pedicle was sectioned with a #15 blade. The pedicle was de-bulked and trimmed to match the shape of the defect. Hemostasis was achieved. The flap donor site and free margin of the flap were secured with deep buried sutures and the wound edges were re-approximated.
Chonodrocutaneous Helical Advancement Flap Text: The defect edges were debeveled with a #15 scalpel blade. Given the location of the defect and the proximity to free margins a chondrocutaneous helical advancement flap was deemed most appropriate. Using a sterile surgical marker, the appropriate advancement flap was drawn incorporating the defect and placing the expected incisions within the relaxed skin tension lines where possible. The area thus outlined was incised deep to adipose tissue with a #15 scalpel blade. The skin margins were undermined to an appropriate distance in all directions utilizing iris scissors.
Ear Wedge Repair Text: A wedge excision was completed by carrying down an excision through the full thickness of the ear and cartilage with an inward facing Burow's triangle. The wound was then closed in a layered fashion.
Partial Purse String (Intermediate) Text: Given the location of the defect and the characteristics of the surrounding skin an intermediate purse string closure was deemed most appropriate. Undermining was performed circumfirentially around the surgical defect. A purse string suture was then placed and tightened. Wound tension only allowed a partial closure of the circular defect.
Body Location Override (Optional - Billing Will Still Be Based On Selected Body Map Location If Applicable): left nasal root
Melolabial Transposition Flap Text: The defect edges were debeveled with a #15 scalpel blade. Given the location of the defect and the proximity to free margins a melolabial flap was deemed most appropriate. Using a sterile surgical marker, an appropriate melolabial transposition flap was drawn incorporating the defect. The area thus outlined was incised deep to adipose tissue with a #15 scalpel blade. The flap was raised from distal to proximal and mobilized until it could be inset into the defect without tension. The skin margins were undermined to an appropriate distance in all directions utilizing iris scissors.
Double O-Z Plasty Text: The defect edges were debeveled with a #15 scalpel blade. Given the location of the defect, shape of the defect and the proximity to free margins a Double O-Z plasty (double transposition flap) was deemed most appropriate. Using a sterile surgical marker, the appropriate transposition flaps were drawn incorporating the defect and placing the expected incisions within the relaxed skin tension lines where possible. The area thus outlined was incised deep to adipose tissue with a #15 scalpel blade. The skin margins were undermined to an appropriate distance in all directions utilizing iris scissors. Hemostasis was achieved with electrocautery. The flaps were then transposed into place, one clockwise and the other counterclockwise, and anchored with interrupted buried subcutaneous sutures.
Full Thickness Lip Wedge Repair (Flap) Text: Given the location of the defect and the proximity to free margins a full thickness wedge repair was deemed most appropriate. Using a sterile surgical marker, the appropriate repair was drawn incorporating the defect and placing the expected incisions perpendicular to the vermilion border. The vermilion border was also meticulously outlined to ensure appropriate reapproximation during the repair. The area thus outlined was incised through and through with a #15 scalpel blade. The muscularis and dermis were reaproximated with deep sutures following hemostasis. Care was taken to realign the vermilion border before proceeding with the superficial closure. Once the vermilion was realigned the superfical and mucosal closure was finished.
Anesthesia Volume In Cc: 1.5
Closure 3 Information: This tab is for additional flaps and grafts above and beyond our usual structured repairs. Please note if you enter information here it will not currently bill and you will need to add the billing information manually.
Anesthesia Type: 1% lidocaine with epinephrine
Wound Care: Petrolatum
Graft Donor Site Dermal Sutures (Optional): 4-0 Monocryl
Paramedian Forehead Flap Division And Inset Text: Division and inset of the paramedian forehead flap was performed to achieve optimal aesthetic result, restore normal anatomic appearance and avoid distortion of normal anatomy, expedite and facilitate wound healing, achieve optimal functional result and because linear closure either not possible or would produce suboptimal result. The patient was prepped and draped in the usual manner. The pedicle was infiltrated with local anesthesia. The pedicle was sectioned with a #15 blade. The pedicle was de-bulked and trimmed to match the shape of the defect. Hemostasis was achieved. The flap donor site and free margin of the flap were secured with deep buried sutures and the wound edges were re-approximated.
Localized Dermabrasion Text: The patient was draped in routine manner. Localized dermabrasion using 3 x 17 mm wire brush was performed in routine manner to papillary dermis. This spot dermabrasion is being performed to complete skin cancer reconstruction. It also will eliminate the other sun damaged precancerous cells that are known to be part of the regional effect of a lifetime's worth of sun exposure. This localized dermabrasion is therapeutic and should not be considered cosmetic in any regard.
Crescentic Advancement Flap Text: The defect edges were debeveled with a #15 scalpel blade. Given the location of the defect and the proximity to free margins a crescentic advancement flap was deemed most appropriate. Using a sterile surgical marker, the appropriate advancement flap was drawn incorporating the defect and placing the expected incisions within the relaxed skin tension lines where possible. The area thus outlined was incised deep to adipose tissue with a #15 scalpel blade. The skin margins were undermined to an appropriate distance in all directions utilizing iris scissors.
Rhombic Flap Text: The defect edges were debeveled with a #15 scalpel blade. Given the location of the defect and the proximity to free margins a rhombic flap was deemed most appropriate. Using a sterile surgical marker, an appropriate rhombic flap was drawn incorporating the defect. The area thus outlined was incised deep to adipose tissue with a #15 scalpel blade. The skin margins were undermined to an appropriate distance in all directions utilizing iris scissors.
Posterior Auricular Interpolation Flap Division And Inset Text: Division and inset of the posterior auricular interpolation flap was performed to achieve optimal aesthetic result, restore normal anatomic appearance and avoid distortion of normal anatomy, expedite and facilitate wound healing, achieve optimal functional result and because linear closure either not possible or would produce suboptimal result. The patient was prepped and draped in the usual manner. The pedicle was infiltrated with local anesthesia. The pedicle was sectioned with a #15 blade. The pedicle was de-bulked and trimmed to match the shape of the defect. Hemostasis was achieved. The flap donor site and free margin of the flap were secured with deep buried sutures and the wound edges were re-approximated.
Tarsorrhaphy Text: A tarsorrhaphy was performed using Frost sutures.
A-T Advancement Flap Text: The defect edges were debeveled with a #15 scalpel blade. Given the location of the defect, shape of the defect and the proximity to free margins an A-T advancement flap was deemed most appropriate. Using a sterile surgical marker, an appropriate advancement flap was drawn incorporating the defect and placing the expected incisions within the relaxed skin tension lines where possible. The area thus outlined was incised deep to adipose tissue with a #15 scalpel blade. The skin margins were undermined to an appropriate distance in all directions utilizing iris scissors.
S Plasty Text: Given the location and shape of the defect, and the orientation of relaxed skin tension lines, an S-plasty was deemed most appropriate for repair. Using a sterile surgical marker, the appropriate outline of the S-plasty was drawn, incorporating the defect and placing the expected incisions within the relaxed skin tension lines where possible. The area thus outlined was incised deep to adipose tissue with a #15 scalpel blade. The skin margins were undermined to an appropriate distance in all directions utilizing iris scissors. The skin flaps were advanced over the defect. The opposing margins were then approximated with interrupted buried subcutaneous sutures.
Ftsg Text: The defect edges were debeveled with a #15 scalpel blade. Given the location of the defect, shape of the defect and the proximity to free margins a full thickness skin graft was deemed most appropriate. Using a sterile surgical marker, the primary defect shape was transferred to the donor site. The area thus outlined was incised deep to adipose tissue with a #15 scalpel blade. The harvested graft was then trimmed of adipose tissue until only dermis and epidermis was left. The skin margins of the secondary defect were undermined to an appropriate distance in all directions utilizing iris scissors. The secondary defect was closed with interrupted buried subcutaneous sutures. The skin edges were then re-apposed with running  sutures. The skin graft was then placed in the primary defect and oriented appropriately.
Rhomboid Transposition Flap Text: The defect edges were debeveled with a #15 scalpel blade. Given the location of the defect and the proximity to free margins a rhomboid transposition flap was deemed most appropriate. Using a sterile surgical marker, an appropriate rhomboid flap was drawn incorporating the defect. The area thus outlined was incised deep to adipose tissue with a #15 scalpel blade. The skin margins were undermined to an appropriate distance in all directions utilizing iris scissors.
O-L Flap Text: The defect edges were debeveled with a #15 scalpel blade. Given the location of the defect, shape of the defect and the proximity to free margins an O-L flap was deemed most appropriate. Using a sterile surgical marker, an appropriate advancement flap was drawn incorporating the defect and placing the expected incisions within the relaxed skin tension lines where possible. The area thus outlined was incised deep to adipose tissue with a #15 scalpel blade. The skin margins were undermined to an appropriate distance in all directions utilizing iris scissors.
V-Y Plasty Text: The defect edges were debeveled with a #15 scalpel blade. Given the location of the defect, shape of the defect and the proximity to free margins an V-Y advancement flap was deemed most appropriate. Using a sterile surgical marker, an appropriate advancement flap was drawn incorporating the defect and placing the expected incisions within the relaxed skin tension lines where possible. The area thus outlined was incised deep to adipose tissue with a #15 scalpel blade. The skin margins were undermined to an appropriate distance in all directions utilizing iris scissors.
Split-Thickness Skin Graft Text: The defect edges were debeveled with a #15 scalpel blade. Given the location of the defect, shape of the defect and the proximity to free margins a split thickness skin graft was deemed most appropriate. Using a sterile surgical marker, the primary defect shape was transferred to the donor site. The split thickness graft was then harvested. The skin graft was then placed in the primary defect and oriented appropriately.
Repair Type: Graft
Non-Graft Cartilage Fenestration Text: The cartilage was fenestrated with a 2mm punch biopsy to help facilitate healing.
Consent: The rationale for the repair was explained to the patient and consent was obtained. The risks, benefits and alternatives to therapy were discussed in detail. Specifically, the risks of infection, scarring, bleeding, prolonged wound healing, incomplete removal, allergy to anesthesia, nerve injury and recurrence were addressed. Prior to the procedure, the treatment site was clearly identified and confirmed by the patient. All components of Universal Protocol/PAUSE Rule completed.
O-T Advancement Flap Text: The defect edges were debeveled with a #15 scalpel blade. Given the location of the defect, shape of the defect and the proximity to free margins an O-T advancement flap was deemed most appropriate. Using a sterile surgical marker, an appropriate advancement flap was drawn incorporating the defect and placing the expected incisions within the relaxed skin tension lines where possible. The area thus outlined was incised deep to adipose tissue with a #15 scalpel blade. The skin margins were undermined to an appropriate distance in all directions utilizing iris scissors. The flaps were incised medial and lateral to the defect and raised and advanced. They were then advanced towards each other and sutured in place. Burows triangles were removed laterally, and a triangle of redundant tissue was removed with a 15 blade.
Manual Repair Warning Statement: We plan on removing the manually selected variable below in favor of our much easier automatic structured text blocks found in the previous tab. We decided to do this to help make the flow better and give you the full power of structured data. Manual selection is never going to be ideal in our platform and I would encourage you to avoid using manual selection from this point on, especially since I will be sunsetting this feature. It is important that you do one of two things with the customized text below. First, you can save all of the text in a word file so you can have it for future reference. Second, transfer the text to the appropriate area in the Library tab. Lastly, if there is a flap or graft type which we do not have you need to let us know right away so I can add it in before the variable is hidden. No need to panic, we plan to give you roughly 6 months to make the change.
Complex Repair And Flap Additional Text (Will Appearing After The Standard Complex Repair Text): The complex repair was not sufficient to completely close the primary defect. The remaining additional defect was repaired with the flap mentioned below.
Bi-Rhombic Flap Text: The defect edges were debeveled with a #15 scalpel blade. Given the location of the defect and the proximity to free margins a bi-rhombic flap was deemed most appropriate. Using a sterile surgical marker, an appropriate rhombic flap was drawn incorporating the defect. The area thus outlined was incised deep to adipose tissue with a #15 scalpel blade. The skin margins were undermined to an appropriate distance in all directions utilizing iris scissors.
Post-Care Instructions: I reviewed with the patient in detail post-care instructions. Patient is not to engage in any heavy lifting, exercise, or swimming for the next 14 days. Should the patient develop any fevers, chills, bleeding, severe pain patient will contact the office immediately.
Graft Cartilage Fenestration Text: The cartilage was fenestrated with a 2mm punch biopsy to help facilitate graft survival and healing.
Complex Repair And Graft Additional Text (Will Appearing After The Standard Complex Repair Text): The complex repair was not sufficient to completely close the primary defect. The remaining additional defect was repaired with the graft mentioned below.
H Plasty Text: Given the location of the defect, shape of the defect and the proximity to free margins a H-plasty was deemed most appropriate for repair. Using a sterile surgical marker, the appropriate advancement arms of the H-plasty were drawn incorporating the defect and placing the expected incisions within the relaxed skin tension lines where possible. The area thus outlined was incised deep to adipose tissue with a #15 scalpel blade. The skin margins were undermined to an appropriate distance in all directions utilizing iris scissors. The opposing advancement arms were then advanced into place in opposite direction and anchored with interrupted buried subcutaneous sutures.
Cartilage Graft Text: The defect edges were debeveled with a #15 scalpel blade. Given the location of the defect, shape of the defect, the fact the defect involved a full thickness cartilage defect a cartilage graft was deemed most appropriate. An appropriate donor site was identified, cleansed, and anesthetized. The cartilage graft was then harvested and transferred to the recipient site, oriented appropriately and then sutured into place. The secondary defect was then repaired using a primary closure.
W Plasty Text: The lesion was extirpated to the level of the fat with a #15 scalpel blade. Given the location of the defect, shape of the defect and the proximity to free margins a W-plasty was deemed most appropriate for repair. Using a sterile surgical marker, the appropriate transposition arms of the W-plasty were drawn incorporating the defect and placing the expected incisions within the relaxed skin tension lines where possible. The area thus outlined was incised deep to adipose tissue with a #15 scalpel blade. The skin margins were undermined to an appropriate distance in all directions utilizing iris scissors. The opposing transposition arms were then transposed into place in opposite direction and anchored with interrupted buried subcutaneous sutures.
Secondary Intention Text (Leave Blank If You Do Not Want): The defect will heal with secondary intention.
Helical Rim Advancement Flap Text: The defect edges were debeveled with a #15 blade scalpel. Given the location of the defect and the proximity to free margins (helical rim) a double helical rim advancement flap was deemed most appropriate. Using a sterile surgical marker, the appropriate advancement flaps were drawn incorporating the defect and placing the expected incisions between the helical rim and antihelix where possible. The area thus outlined was incised through and through with a #15 scalpel blade. With a skin hook and iris scissors, the flaps were gently and sharply undermined and freed up.
Graft Type: Split Thickness Skin Graft
Closure 4 Information: This tab is for additional flaps and grafts above and beyond our usual structured repairs.  Please note if you enter information here it will not currently bill and you will need to add the billing information manually.
O-Z Flap Text: The defect edges were debeveled with a #15 scalpel blade. Given the location of the defect, shape of the defect and the proximity to free margins an O-Z flap was deemed most appropriate. Using a sterile surgical marker, an appropriate transposition flap was drawn incorporating the defect and placing the expected incisions within the relaxed skin tension lines where possible. The area thus outlined was incised deep to adipose tissue with a #15 scalpel blade. The skin margins were undermined to an appropriate distance in all directions utilizing iris scissors.
Epidermal Closure Graft Donor Site (Optional): subcuticular
Composite Graft Text: The defect edges were debeveled with a #15 scalpel blade. Given the location of the defect, shape of the defect, the proximity to free margins and the fact the defect was full thickness a composite graft was deemed most appropriate. The defect was outline and then transferred to the donor site. A full thickness graft was then excised from the donor site. The graft was then placed in the primary defect, oriented appropriately and then sutured into place. The secondary defect was then repaired using a primary closure.
Graft Donor Site Bandage (Optional-Leave Blank If You Don't Want In Note): Steri-strips and a pressure bandage were applied to the donor site.
Bilateral Helical Rim Advancement Flap Text: The defect edges were debeveled with a #15 blade scalpel. Given the location of the defect and the proximity to free margins (helical rim) a bilateral helical rim advancement flap was deemed most appropriate. Using a sterile surgical marker, the appropriate advancement flaps were drawn incorporating the defect and placing the expected incisions between the helical rim and antihelix where possible. The area thus outlined was incised through and through with a #15 scalpel blade. With a skin hook and iris scissors, the flaps were gently and sharply undermined and freed up.
V-Y Flap Text: The defect edges were debeveled with a #15 scalpel blade. Given the location of the defect, shape of the defect and the proximity to free margins a V-Y flap was deemed most appropriate. Using a sterile surgical marker, an appropriate advancement flap was drawn incorporating the defect and placing the expected incisions within the relaxed skin tension lines where possible. The area thus outlined was incised deep to adipose tissue with a #15 scalpel blade. The skin margins were undermined to an appropriate distance in all directions utilizing iris scissors.
Z Plasty Text: The lesion was extirpated to the level of the fat with a #15 scalpel blade. Given the location of the defect, shape of the defect and the proximity to free margins a Z-plasty was deemed most appropriate for repair. Using a sterile surgical marker, the appropriate transposition arms of the Z-plasty were drawn incorporating the defect and placing the expected incisions within the relaxed skin tension lines where possible. The area thus outlined was incised deep to adipose tissue with a #15 scalpel blade. The skin margins were undermined to an appropriate distance in all directions utilizing iris scissors. The opposing transposition arms were then transposed into place in opposite direction and anchored with interrupted buried subcutaneous sutures.
Epidermal Autograft Text: The defect edges were debeveled with a #15 scalpel blade. Given the location of the defect, shape of the defect and the proximity to free margins an epidermal autograft was deemed most appropriate. Using a sterile surgical marker, the primary defect shape was transferred to the donor site. The epidermal graft was then harvested. The skin graft was then placed in the primary defect and oriented appropriately.
Suturegard Retention Suture: 2-0 Nylon
Graft Donor Site: Left pre-auricular area
No Repair - Repaired With Adjacent Surgical Defect Text (Leave Blank If You Do Not Want): After obtaining clear surgical margins the defect was repaired concurrently with another surgical defect which was in close approximation.
Double O-Z Flap Text: The defect edges were debeveled with a #15 scalpel blade. Given the location of the defect, shape of the defect and the proximity to free margins a Double O-Z flap was deemed most appropriate. Using a sterile surgical marker, an appropriate transposition flap was drawn incorporating the defect and placing the expected incisions within the relaxed skin tension lines where possible. The area thus outlined was incised deep to adipose tissue with a #15 scalpel blade. The skin margins were undermined to an appropriate distance in all directions utilizing iris scissors.
Hemostasis: Electrocautery
Estimated Blood Loss (Cc): minimal
Primary Defect Length (In Cm): 1.6
Ear Star Wedge Flap Text: The defect edges were debeveled with a #15 blade scalpel. Given the location of the defect and the proximity to free margins (helical rim) an ear star wedge flap was deemed most appropriate. Using a sterile surgical marker, the appropriate flap was drawn incorporating the defect and placing the expected incisions between the helical rim and antihelix where possible. The area thus outlined was incised through and through with a #15 scalpel blade.
Dressing: pressure dressing with telfa
Trilobed Flap Text: The defect edges were debeveled with a #15 scalpel blade. Given the location of the defect and the proximity to free margins a trilobed flap was deemed most appropriate. Using a sterile surgical marker, an appropriate trilobed flap drawn around the defect. The area thus outlined was incised deep to adipose tissue with a #15 scalpel blade. The skin margins were undermined to an appropriate distance in all directions utilizing iris scissors.
Referred To Oculoplastics For Closure Text (Leave Blank If You Do Not Want): After obtaining clear surgical margins the patient was sent to oculoplastics for surgical repair. The patient understands they will receive post-surgical care and follow-up from the referring physician's office.
Cheek Interpolation Flap Text: A decision was made to reconstruct the defect utilizing an interpolation axial flap and a staged reconstruction. A telfa template was made of the defect. This telfa template was then used to outline the Cheek Interpolation flap. The donor area for the pedicle flap was then injected with anesthesia. The flap was excised through the skin and subcutaneous tissue down to the layer of the underlying musculature. The interpolation flap was carefully excised within this deep plane to maintain its blood supply. The edges of the donor site were undermined. The donor site was closed in a primary fashion. The pedicle was then rotated into position and sutured. Once the tube was sutured into place, adequate blood supply was confirmed with blanching and refill. The pedicle was then wrapped with xeroform gauze and dressed appropriately with a telfa and gauze bandage to ensure continued blood supply and protect the attached pedicle.
Dermal Autograft Text: The defect edges were debeveled with a #15 scalpel blade. Given the location of the defect, shape of the defect and the proximity to free margins a dermal autograft was deemed most appropriate. Using a sterile surgical marker, the primary defect shape was transferred to the donor site. The area thus outlined was incised deep to adipose tissue with a #15 scalpel blade. The harvested graft was then trimmed of adipose and epidermal tissue until only dermis was left. The skin graft was then placed in the primary defect and oriented appropriately.
Mercedes Flap Text: The defect edges were debeveled with a #15 scalpel blade. Given the location of the defect, shape of the defect and the proximity to free margins a Mercedes flap was deemed most appropriate. Using a sterile surgical marker, an appropriate advancement flap was drawn incorporating the defect and placing the expected incisions within the relaxed skin tension lines where possible. The area thus outlined was incised deep to adipose tissue with a #15 scalpel blade. The skin margins were undermined to an appropriate distance in all directions utilizing iris scissors.
Banner Transposition Flap Text: The defect edges were debeveled with a #15 scalpel blade. Given the location of the defect and the proximity to free margins a Banner transposition flap was deemed most appropriate. Using a sterile surgical marker, an appropriate flap drawn around the defect. The area thus outlined was incised deep to adipose tissue with a #15 scalpel blade. The skin margins were undermined to an appropriate distance in all directions utilizing iris scissors.
Cheek-To-Nose Interpolation Flap Text: A decision was made to reconstruct the defect utilizing an interpolation axial flap and a staged reconstruction. A telfa template was made of the defect. This telfa template was then used to outline the Cheek-To-Nose Interpolation flap. The donor area for the pedicle flap was then injected with anesthesia. The flap was excised through the skin and subcutaneous tissue down to the layer of the underlying musculature. The interpolation flap was carefully excised within this deep plane to maintain its blood supply. The edges of the donor site were undermined. The donor site was closed in a primary fashion. The pedicle was then rotated into position and sutured. Once the tube was sutured into place, adequate blood supply was confirmed with blanching and refill. The pedicle was then wrapped with xeroform gauze and dressed appropriately with a telfa and gauze bandage to ensure continued blood supply and protect the attached pedicle.
Dorsal Nasal Flap Text: The defect edges were debeveled with a #15 scalpel blade. Given the location of the defect and the proximity to free margins a dorsal nasal flap was deemed most appropriate. Using a sterile surgical marker, an appropriate dorsal nasal flap was drawn around the defect. The area thus outlined was incised deep to adipose tissue with a #15 scalpel blade. The flap raised and mobilized until it could be rotated into the defect. The skin margins were undermined to an appropriate distance in all directions utilizing iris scissors.
Closure 2 Information: This tab is for additional flaps and grafts, including complex repair and grafts and complex repair and flaps. You can also specify a different location for the additional defect, if the location is the same you do not need to select a new one. We will insert the automated text for the repair you select below just as we do for solitary flaps and grafts. Please note that at this time if you select a location with a different insurance zone you will need to override the ICD10 and CPT if appropriate.
Skin Substitute Text: The defect edges were debeveled with a #15 scalpel blade. Given the location of the defect, shape of the defect and the proximity to free margins a skin substitute graft was deemed most appropriate. The graft material was trimmed to fit the size of the defect. The graft was then placed in the primary defect and oriented appropriately.
Epidermal Sutures: 5-0 Plain Gut
Referred To Otolaryngology For Closure Text (Leave Blank If You Do Not Want): After obtaining clear surgical margins the patient was sent to otolaryngology for surgical repair. The patient understands they will receive post-surgical care and follow-up from the referring physician's office.
Advancement-Rotation Flap Text: The defect edges were debeveled with a #15 scalpel blade. Given the location of the defect, shape of the defect and the proximity to free margins an advancement-rotation flap was deemed most appropriate. Using a sterile surgical marker, an appropriate flap was drawn incorporating the defect and placing the expected incisions within the relaxed skin tension lines where possible. The area thus outlined was incised deep to adipose tissue with a #15 scalpel blade. The skin margins were undermined to an appropriate distance in all directions utilizing iris scissors.
Bilobed Flap Text: The defect edges were debeveled with a #15 scalpel blade. Given the location of the defect and the proximity to free margins a bilobe flap was deemed most appropriate. Using a sterile surgical marker, an appropriate bilobe flap drawn around the defect. The area thus outlined was incised deep to adipose tissue with a #15 scalpel blade. The flaps were raised from distal to proximal and mobilized until the flaps could be rotated into the defect. The skin margins were undermined to an appropriate distance in all directions utilizing iris scissors.
Island Pedicle Flap Text: The defect edges were debeveled with a #15 scalpel blade. Given the location of the defect, shape of the defect and the proximity to free margins an island pedicle advancement flap was deemed most appropriate. Using a sterile surgical marker, an appropriate advancement flap was drawn incorporating the defect, outlining the appropriate donor tissue and placing the expected incisions within the relaxed skin tension lines where possible. The area thus outlined was incised deep to adipose tissue with a #15 scalpel blade. The skin margins were undermined to an appropriate distance in all directions around the primary defect and laterally outward around the island pedicle utilizing iris scissors. There was minimal undermining beneath the pedicle flap.
Referred To Plastics For Closure Text (Leave Blank If You Do Not Want): After obtaining clear surgical margins the patient was sent to plastics for surgical repair. The patient understands they will receive post-surgical care and follow-up from the referring physician's office.
Interpolation Flap Text: A decision was made to reconstruct the defect utilizing an interpolation axial flap and a staged reconstruction. A telfa template was made of the defect. This telfa template was then used to outline the interpolation flap. The donor area for the pedicle flap was then injected with anesthesia. The flap was excised through the skin and subcutaneous tissue down to the layer of the underlying musculature. The interpolation flap was carefully excised within this deep plane to maintain its blood supply. The edges of the donor site were undermined. The donor site was closed in a primary fashion. The pedicle was then rotated into position and sutured. Once the tube was sutured into place, adequate blood supply was confirmed with blanching and refill. The pedicle was then wrapped with xeroform gauze and dressed appropriately with a telfa and gauze bandage to ensure continued blood supply and protect the attached pedicle.
Skin Substitute Paste Text: The defect edges were debeveled with a #15 scalpel blade. Given the location of the defect, shape of the defect and the proximity to free margins a skin substitute micronized graft was deemed most appropriate. The entire vial contents were admixed with 0.5ccs of sterile saline, formed into a paste and then evenly spread over the entire wound bed.
Retention Suture Bite Size: 3 mm
Mucosal Advancement Flap Text: Given the location of the defect, shape of the defect and the proximity to free margins a mucosal advancement flap was deemed most appropriate. Incisions were made with a 15 blade scalpel in the appropriate fashion along the cutaneous vermilion border and the mucosal lip. The remaining actinically damaged mucosal tissue was excised. The mucosal advancement flap was then elevated to the gingival sulcus with care taken to preserve the neurovascular structures and advanced into the primary defect. Care was taken to ensure that precise realignment of the vermilion border was achieved.
Island Pedicle Flap With Canthal Suspension Text: The defect edges were debeveled with a #15 scalpel blade. Given the location of the defect, shape of the defect and the proximity to free margins an island pedicle advancement flap was deemed most appropriate. Using a sterile surgical marker, an appropriate advancement flap was drawn incorporating the defect, outlining the appropriate donor tissue and placing the expected incisions within the relaxed skin tension lines where possible. The area thus outlined was incised deep to adipose tissue with a #15 scalpel blade. The skin margins were undermined to an appropriate distance in all directions around the primary defect and laterally outward around the island pedicle utilizing iris scissors. There was minimal undermining beneath the pedicle flap. A suspension suture was placed in the canthal tendon to prevent tension and prevent ectropion.
Melolabial Interpolation Flap Text: A decision was made to reconstruct the defect utilizing an interpolation axial flap and a staged reconstruction. A telfa template was made of the defect. This telfa template was then used to outline the melolabial interpolation flap. The donor area for the pedicle flap was then injected with anesthesia. The flap was excised through the skin and subcutaneous tissue down to the layer of the underlying musculature. The pedicle flap was carefully excised within this deep plane to maintain its blood supply. The edges of the donor site were undermined. The donor site was closed in a primary fashion. The pedicle was then rotated into position and sutured. Once the tube was sutured into place, adequate blood supply was confirmed with blanching and refill. The pedicle was then wrapped with xeroform gauze and dressed appropriately with a telfa and gauze bandage to ensure continued blood supply and protect the attached pedicle.
Skin Substitute Injection Text: The defect edges were debeveled with a #15 scalpel blade. Given the location of the defect, shape of the defect and the proximity to free margins a skin substitute micronized graft was deemed most appropriate. The entire vial contents were admixed with 3.0ccs of sterile saline and then injected subcutaneously throughout the entire wound bed.
Bilobed Transposition Flap Text: The defect edges were debeveled with a #15 scalpel blade. Given the location of the defect and the proximity to free margins a bilobed transposition flap was deemed most appropriate. Using a sterile surgical marker, an appropriate bilobe flap drawn around the defect. The area thus outlined was incised deep to adipose tissue with a #15 scalpel blade. The skin margins were undermined to an appropriate distance in all directions utilizing iris scissors. The flaps were raised from distal to proximal and mobilized until the flaps could be rotated into the defect.
Referred To Asc For Closure Text (Leave Blank If You Do Not Want): After obtaining clear surgical margins the patient was sent to an [facility] for surgical repair. The patient understands they will receive post-surgical care and follow-up from the [facility] physician.
Information: Selecting Yes will display possible errors in your note based on the variables you have selected. This validation is only offered as a suggestion for you. PLEASE NOTE THAT THE VALIDATION TEXT WILL BE REMOVED WHEN YOU FINALIZE YOUR NOTE. IF YOU WANT TO FAX A PRELIMINARY NOTE YOU WILL NEED TO TOGGLE THIS TO 'NO' IF YOU DO NOT WANT IT IN YOUR FAXED NOTE.
Modified Advancement Flap Text: The defect edges were debeveled with a #15 scalpel blade. Given the location of the defect, shape of the defect and the proximity to free margins a modified advancement flap was deemed most appropriate. Using a sterile surgical marker, an appropriate advancement flap was drawn incorporating the defect and placing the expected incisions within the relaxed skin tension lines where possible. The area thus outlined was incised deep to adipose tissue with a #15 scalpel blade. The skin margins were undermined to an appropriate distance in all directions utilizing iris scissors.
Hatchet Flap Text: The defect edges were debeveled with a #15 scalpel blade. Given the location of the defect, shape of the defect and the proximity to free margins a hatchet flap was deemed most appropriate. Using a sterile surgical marker, an appropriate hatchet flap was drawn incorporating the defect and placing the expected incisions within the relaxed skin tension lines where possible. The area thus outlined was incised deep to adipose tissue with a #15 scalpel blade. The skin margins were undermined to an appropriate distance in all directions utilizing iris scissors.
Detail Level: Simple
Referred To Mid-Level For Closure Text (Leave Blank If You Do Not Want): After obtaining clear surgical margins the patient was sent to a mid-level provider for surgical repair. The patient understands they will receive post-surgical care and follow-up from the mid-level provider.
Mastoid Interpolation Flap Text: A decision was made to reconstruct the defect utilizing an interpolation axial flap and a staged reconstruction. A telfa template was made of the defect. This telfa template was then used to outline the mastoid interpolation flap. The donor area for the pedicle flap was then injected with anesthesia. The flap was excised through the skin and subcutaneous tissue down to the layer of the underlying musculature. The pedicle flap was carefully excised within this deep plane to maintain its blood supply. The edges of the donor site were undermined. The donor site was closed in a primary fashion. The pedicle was then rotated into position and sutured. Once the tube was sutured into place, adequate blood supply was confirmed with blanching and refill. The pedicle was then wrapped with xeroform gauze and dressed appropriately with a telfa and gauze bandage to ensure continued blood supply and protect the attached pedicle.
Alar Island Pedicle Flap Text: The defect edges were debeveled with a #15 scalpel blade. Given the location of the defect, shape of the defect and the proximity to the alar rim an island pedicle advancement flap was deemed most appropriate. Using a sterile surgical marker, an appropriate advancement flap was drawn incorporating the defect, outlining the appropriate donor tissue and placing the expected incisions within the nasal ala running parallel to the alar rim. The area thus outlined was incised with a #15 scalpel blade. The skin margins were undermined minimally to an appropriate distance in all directions around the primary defect and laterally outward around the island pedicle utilizing iris scissors. There was minimal undermining beneath the pedicle flap.
Tissue Cultured Epidermal Autograft Text: The defect edges were debeveled with a #15 scalpel blade. Given the location of the defect, shape of the defect and the proximity to free margins a tissue cultured epidermal autograft was deemed most appropriate. The graft was then trimmed to fit the size of the defect. The graft was then placed in the primary defect and oriented appropriately.

## 2019-09-11 NOTE — PROCEDURE: MOHS SURGERY
Bcc  Nodular Histology Text: There are uniform nodular masses of basaloid neoplastic cells with scanty cytoplasm and peripheral palisading with a loose fibrous stroma. The appearance is typical for a nodular basal cell carcinoma.
Asc Procedure Text (D): After obtaining clear surgical margins the patient was sent to an [facility] for surgical repair. The patient understands they will receive post-surgical care and follow-up from the [facility] physician.
Quadrants Reporting?: 0
Quadrant Reporting?: no
Crescentic Complex Repair Preamble Text (Leave Blank If You Do Not Want): Extensive wide undermining was performed.
Full Thickness Lip Wedge Repair (Flap) Text: Given the location of the defect and the proximity to free margins a full thickness wedge repair was deemed most appropriate. Using a sterile surgical marker, the appropriate repair was drawn incorporating the defect and placing the expected incisions perpendicular to the vermilion border. The vermilion border was also meticulously outlined to ensure appropriate reapproximation during the repair. The area thus outlined was incised through and through with a #15 scalpel blade. The muscularis and dermis were reaproximated with deep sutures following hemostasis. Care was taken to realign the vermilion border before proceeding with the superficial closure. Once the vermilion was realigned the superfical and mucosal closure was finished.
Oculoplastic Surgeon Procedure Text (D): After obtaining clear surgical margins the patient was sent to oculoplastics for surgical repair. The patient understands they will receive post-surgical care and follow-up from the referring physician's office.
Tarsorrhaphy Text: A tarsorrhaphy was performed using Frost sutures.
Repair Type: Referred to plastics for closure
Island Pedicle Flap Text: The defect edges were debeveled with a #15 scalpel blade. Given the location of the defect, shape of the defect and the proximity to free margins an island pedicle advancement flap was deemed most appropriate. Using a sterile surgical marker, an appropriate advancement flap was drawn incorporating the defect, outlining the appropriate donor tissue and placing the expected incisions within the relaxed skin tension lines where possible. The area thus outlined was incised deep to adipose tissue with a #15 scalpel blade. The skin margins were undermined to an appropriate distance in all directions around the primary defect and laterally outward around the island pedicle utilizing iris scissors. There was minimal undermining beneath the pedicle flap.
Date Of Previous Biopsy (Optional): 8/27/19
Mid-Level Procedure Text (E): After obtaining clear surgical margins the patient was sent to a mid-level provider for surgical repair. The patient understands they will receive post-surgical care and follow-up from the mid-level provider.
Show Specific Providers When Referring To Others For Closure?: Yes
Area M Indication Text: Tumors in this location are included in Area M (cheek, forehead, scalp, neck, jawline and pretibial skin). Mohs surgery is indicated for tumors in these anatomic locations.
Same Histology In Subsequent Stages Text: The pattern and morphology of the tumor is as described in the first stage.
Double O-Z Plasty Text: The defect edges were debeveled with a #15 scalpel blade. Given the location of the defect, shape of the defect and the proximity to free margins a Double O-Z plasty (double transposition flap) was deemed most appropriate. Using a sterile surgical marker, the appropriate transposition flaps were drawn incorporating the defect and placing the expected incisions within the relaxed skin tension lines where possible. The area thus outlined was incised deep to adipose tissue with a #15 scalpel blade. The skin margins were undermined to an appropriate distance in all directions utilizing iris scissors. Hemostasis was achieved with electrocautery. The flaps were then transposed into place, one clockwise and the other counterclockwise, and anchored with interrupted buried subcutaneous sutures.
Afx Histology Text: there is a poorly differentiated spindled cell neoplasm composed of fascicles of atypical spindled and epithelioid cells, infiltrating and replacing papillary and reticular dermis. Mitotic activity is brisk, including atypical forms. The lesion is present on a sun-damaged skin.  This pattern supports the diagnosis of atypical fibrous xanthoma
Ear Star Wedge Flap Text: The defect edges were debeveled with a #15 blade scalpel. Given the location of the defect and the proximity to free margins (helical rim) an ear star wedge flap was deemed most appropriate. Using a sterile surgical marker, the appropriate flap was drawn incorporating the defect and placing the expected incisions between the helical rim and antihelix where possible. The area thus outlined was incised through and through with a #15 scalpel blade.
Surgeon: Sanjuana Pennington MD
Consent (Temporal Branch)/Introductory Paragraph: The rationale for Mohs was explained to the patient and consent was obtained. The risks, benefits and alternatives to therapy were discussed in detail. Specifically, the risks of damage to the temporal branch of the facial nerve, infection, scarring, bleeding, prolonged wound healing, incomplete removal, allergy to anesthesia, and recurrence were addressed. Prior to the procedure, the treatment site was clearly identified and confirmed by the patient. All components of Universal Protocol/PAUSE Rule completed.
Bcc Histology Text: Beneath an irregular epidermis, there are small nodular masses of basaloid neoplastic cells with nuclear pleomorphism attached to the basal layer and surrounded by a loose fibrous stroma and mild inflammation in the dermis. The findings are typical for a basal cell carcinoma.
Consent (Lip)/Introductory Paragraph: The rationale for Mohs was explained to the patient and consent was obtained. The risks, benefits and alternatives to therapy were discussed in detail. Specifically, the risks of lip deformity, changes in the oral aperture, infection, scarring, bleeding, prolonged wound healing, incomplete removal, allergy to anesthesia, nerve injury and recurrence were addressed. Prior to the procedure, the treatment site was clearly identified and confirmed by the patient. All components of Universal Protocol/PAUSE Rule completed.
Modified Advancement Flap Text: The defect edges were debeveled with a #15 scalpel blade. Given the location of the defect, shape of the defect and the proximity to free margins a modified advancement flap was deemed most appropriate. Using a sterile surgical marker, an appropriate advancement flap was drawn incorporating the defect and placing the expected incisions within the relaxed skin tension lines where possible. The area thus outlined was incised deep to adipose tissue with a #15 scalpel blade. The skin margins were undermined to an appropriate distance in all directions utilizing iris scissors.
Dressing: dry sterile dressing
O-L Flap Text: The defect edges were debeveled with a #15 scalpel blade. Given the location of the defect, shape of the defect and the proximity to free margins an O-L flap was deemed most appropriate. Using a sterile surgical marker, an appropriate advancement flap was drawn incorporating the defect and placing the expected incisions within the relaxed skin tension lines where possible. The area thus outlined was incised deep to adipose tissue with a #15 scalpel blade. The skin margins were undermined to an appropriate distance in all directions utilizing iris scissors.
Advancement Flap (Double) Text: The defect edges were debeveled with a #15 scalpel blade. Given the location of the defect and the proximity to free margins a double advancement flap was deemed most appropriate. Using a sterile surgical marker, the appropriate advancement flaps were drawn incorporating the defect and placing the expected incisions within the relaxed skin tension lines where possible. The area thus outlined was incised deep to adipose tissue with a #15 scalpel blade. The skin margins were undermined to an appropriate distance in all directions utilizing iris scissors.
Referring Physician (Optional): Wali Hidalgo
Suturegard Intro: Intraoperative tissue expansion was performed, utilizing the SUTUREGARD device, in order to reduce wound tension.
Otolaryngologist Procedure Text (C): After obtaining clear surgical margins the patient was sent to otolaryngology for surgical repair. The patient understands they will receive post-surgical care and follow-up from the referring physician's office.
Posterior Auricular Interpolation Flap Text: A decision was made to reconstruct the defect utilizing an interpolation axial flap and a staged reconstruction. A telfa template was made of the defect. This telfa template was then used to outline the posterior auricular interpolation flap. The donor area for the pedicle flap was then injected with anesthesia. The flap was excised through the skin and subcutaneous tissue down to the layer of the underlying musculature. The pedicle flap was carefully excised within this deep plane to maintain its blood supply. The edges of the donor site were undermined. The donor site was closed in a primary fashion. The pedicle was then rotated into position and sutured. Once the tube was sutured into place, adequate blood supply was confirmed with blanching and refill. The pedicle was then wrapped with xeroform gauze and dressed appropriately with a telfa and gauze bandage to ensure continued blood supply and protect the attached pedicle.
Melolabial Transposition Flap Text: The defect edges were debeveled with a #15 scalpel blade. Given the location of the defect and the proximity to free margins a melolabial flap was deemed most appropriate. Using a sterile surgical marker, an appropriate melolabial transposition flap was drawn incorporating the defect. The area thus outlined was incised deep to adipose tissue with a #15 scalpel blade. The skin margins were undermined to an appropriate distance in all directions utilizing iris scissors.
Manual Repair Warning Statement: We plan on removing the manually selected variable below in favor of our much easier automatic structured text blocks found in the previous tab. We decided to do this to help make the flow better and give you the full power of structured data. Manual selection is never going to be ideal in our platform and I would encourage you to avoid using manual selection from this point on, especially since I will be sunsetting this feature. It is important that you do one of two things with the customized text below. First, you can save all of the text in a word file so you can have it for future reference. Second, transfer the text to the appropriate area in the Library tab. Lastly, if there is a flap or graft type which we do not have you need to let us know right away so I can add it in before the variable is hidden. No need to panic, we plan to give you roughly 6 months to make the change.
Xenograft Text: The defect edges were debeveled with a #15 scalpel blade. Given the location of the defect, shape of the defect and the proximity to free margins a xenograft was deemed most appropriate. The graft was then trimmed to fit the size of the defect. The graft was then placed in the primary defect and oriented appropriately.
Lazy S Intermediate Repair Preamble Text (Leave Blank If You Do Not Want): Undermining was performed with blunt dissection.
Stage 15: Additional Anesthesia Type: 1% lidocaine with epinephrine
Consent (Marginal Mandibular)/Introductory Paragraph: The rationale for Mohs was explained to the patient and consent was obtained. The risks, benefits and alternatives to therapy were discussed in detail. Specifically, the risks of damage to the marginal mandibular branch of the facial nerve, infection, scarring, bleeding, prolonged wound healing, incomplete removal, allergy to anesthesia, and recurrence were addressed. Prior to the procedure, the treatment site was clearly identified and confirmed by the patient. All components of Universal Protocol/PAUSE Rule completed.
Consent (Scalp)/Introductory Paragraph: The rationale for Mohs was explained to the patient and consent was obtained. The risks, benefits and alternatives to therapy were discussed in detail. Specifically, the risks of changes in hair growth pattern secondary to repair, infection, scarring, bleeding, prolonged wound healing, incomplete removal, allergy to anesthesia, nerve injury and recurrence were addressed. Prior to the procedure, the treatment site was clearly identified and confirmed by the patient. All components of Universal Protocol/PAUSE Rule completed.
Mucosal Advancement Flap Text: Given the location of the defect, shape of the defect and the proximity to free margins a mucosal advancement flap was deemed most appropriate. Incisions were made with a 15 blade scalpel in the appropriate fashion along the cutaneous vermilion border and the mucosal lip. The remaining actinically damaged mucosal tissue was excised. The mucosal advancement flap was then elevated to the gingival sulcus with care taken to preserve the neurovascular structures and advanced into the primary defect. Care was taken to ensure that precise realignment of the vermilion border was achieved.
Perineural Invasion (For Histology - Be Specific If Possible): absent
Bcc Infiltrative Histology Text: There were numerous aggregates of basaloid cells demonstrating an infiltrative pattern
Banner Transposition Flap Text: The defect edges were debeveled with a #15 scalpel blade. Given the location of the defect and the proximity to free margins a Banner transposition flap was deemed most appropriate. Using a sterile surgical marker, an appropriate flap drawn around the defect. The area thus outlined was incised deep to adipose tissue with a #15 scalpel blade. The skin margins were undermined to an appropriate distance in all directions utilizing iris scissors.
Burow's Advancement Flap Text: The defect edges were debeveled with a #15 scalpel blade. Given the location of the defect and the proximity to free margins a Burow's advancement flap was deemed most appropriate. Using a sterile surgical marker, the appropriate advancement flap was drawn incorporating the defect and placing the expected incisions within the relaxed skin tension lines where possible. The area thus outlined was incised deep to adipose tissue with a #15 scalpel blade. The skin margins were undermined to an appropriate distance in all directions utilizing iris scissors.
Suturegard Body: The suture ends were repeatedly re-tightened and re-clamped to achieve the desired tissue expansion.
Plastic Surgeon Procedure Text (C): After obtaining clear surgical margins the patient was sent to plastics for surgical repair. The patient understands they will receive post-surgical care and follow-up from the referring physician's office.
Mid-Level Procedure Text (A): After obtaining clear surgical margins the patient was sent to a mid-level provider for surgical repair.  The patient understands they will receive post-surgical care and follow-up from the mid-level provider.
Length To Time In Minutes Device Was In Place: 10
O-Z Flap Text: The defect edges were debeveled with a #15 scalpel blade. Given the location of the defect, shape of the defect and the proximity to free margins an O-Z flap was deemed most appropriate. Using a sterile surgical marker, an appropriate transposition flap was drawn incorporating the defect and placing the expected incisions within the relaxed skin tension lines where possible. The area thus outlined was incised deep to adipose tissue with a #15 scalpel blade. The skin margins were undermined to an appropriate distance in all directions utilizing iris scissors.
Paramedian Forehead Flap Text: A decision was made to reconstruct the defect utilizing an interpolation axial flap and a staged reconstruction. A telfa template was made of the defect. This telfa template was then used to outline the paramedian forehead pedicle flap. The donor area for the pedicle flap was then injected with anesthesia. The flap was excised through the skin and subcutaneous tissue down to the layer of the underlying musculature. The pedicle flap was carefully excised within this deep plane to maintain its blood supply. The edges of the donor site were undermined. The donor site was closed in a primary fashion. The pedicle was then rotated into position and sutured. Once the tube was sutured into place, adequate blood supply was confirmed with blanching and refill. The pedicle was then wrapped with xeroform gauze and dressed appropriately with a telfa and gauze bandage to ensure continued blood supply and protect the attached pedicle.
Rhombic Flap Text: The defect edges were debeveled with a #15 scalpel blade. Given the location of the defect and the proximity to free margins a rhombic flap was deemed most appropriate. Using a sterile surgical marker, an appropriate rhombic flap was drawn incorporating the defect. The area thus outlined was incised deep to adipose tissue with a #15 scalpel blade. The skin margins were undermined to an appropriate distance in all directions utilizing iris scissors.
Medical Necessity Statement: Based on my medical judgement; after reviewing the pertinent pathology report, physical exam findings and the various treatment options for skin cancer treatment; standard excision and/or destruction technique methods are not clinically sufficient treatment options. To prevent the risk of compromising surgical cure and reconstruction; prompt microscopic examination of the surgical margins is necessary. Based on the given indication(s), maximum conservation of healthy tissue, and high cure rate, Mohs surgery is the most appropriate treatment for this cancer.
Dermal Autograft Text: The defect edges were debeveled with a #15 scalpel blade. Given the location of the defect, shape of the defect and the proximity to free margins a dermal autograft was deemed most appropriate. Using a sterile surgical marker, the primary defect shape was transferred to the donor site. The area thus outlined was incised deep to adipose tissue with a #15 scalpel blade. The harvested graft was then trimmed of adipose and epidermal tissue until only dermis was left. The skin graft was then placed in the primary defect and oriented appropriately.
O-T Plasty Text: The defect edges were debeveled with a #15 scalpel blade. Given the location of the defect, shape of the defect and the proximity to free margins an O-T plasty was deemed most appropriate. Using a sterile surgical marker, an appropriate O-T plasty was drawn incorporating the defect and placing the expected incisions within the relaxed skin tension lines where possible. The area thus outlined was incised deep to adipose tissue with a #15 scalpel blade. The skin margins were undermined to an appropriate distance in all directions utilizing iris scissors.
Home Suture Removal Text: Patient was provided instructions on removing sutures and will remove their sutures at home. If they have any questions or difficulties they will call the office.
Referred To Plastics For Closure Text (Leave Blank If You Do Not Want): After obtaining clear surgical margins the patient was sent to plastics for surgical repair.  The patient understands they will receive post-surgical care and follow-up from the referring physician's office.
Epidermal Sutures: 6-0 Ethilon
Stage 1: Number Of Blocks?: 1
Provider Procedure Text (E): After obtaining clear surgical margins the defect was repaired by another provider.
Cheek Interpolation Flap Text: A decision was made to reconstruct the defect utilizing an interpolation axial flap and a staged reconstruction. A telfa template was made of the defect. This telfa template was then used to outline the Cheek Interpolation flap. The donor area for the pedicle flap was then injected with anesthesia. The flap was excised through the skin and subcutaneous tissue down to the layer of the underlying musculature. The interpolation flap was carefully excised within this deep plane to maintain its blood supply. The edges of the donor site were undermined. The donor site was closed in a primary fashion. The pedicle was then rotated into position and sutured. Once the tube was sutured into place, adequate blood supply was confirmed with blanching and refill. The pedicle was then wrapped with xeroform gauze and dressed appropriately with a telfa and gauze bandage to ensure continued blood supply and protect the attached pedicle.
Rotation Flap Text: The defect edges were debeveled with a #15 scalpel blade. Given the location of the defect, shape of the defect and the proximity to free margins a rotation flap was deemed most appropriate. Using a sterile surgical marker, an appropriate rotation flap was drawn incorporating the defect and placing the expected incisions within the relaxed skin tension lines where possible. The area thus outlined was incised deep to adipose tissue with a #15 scalpel blade. The skin margins were undermined to an appropriate distance in all directions utilizing iris scissors.
Scc In Situ Histology Text: There is hyperkeratosis, acanthosis, and cytologic atypia of keratinocytes with hyperchromatic and pleomorphic nuclei throughout the full thickness of the epidermis. No dermal invasion is seen. Chronic inflammation is present in the dermis.
Double O-Z Flap Text: The defect edges were debeveled with a #15 scalpel blade. Given the location of the defect, shape of the defect and the proximity to free margins a Double O-Z flap was deemed most appropriate. Using a sterile surgical marker, an appropriate transposition flap was drawn incorporating the defect and placing the expected incisions within the relaxed skin tension lines where possible. The area thus outlined was incised deep to adipose tissue with a #15 scalpel blade. The skin margins were undermined to an appropriate distance in all directions utilizing iris scissors.
Previous Accession (Optional): [id number]
Alternatives Discussed Intro (Do Not Add Period): I discussed alternative treatments to Mohs surgery and specifically discussed the risks and benefits of
Partial Purse String (Simple) Text: Given the location of the defect and the characteristics of the surrounding skin a simple purse string closure was deemed most appropriate. Undermining was performed circumfirentially around the surgical defect. A purse string suture was then placed and tightened. Wound tension only allowed a partial closure of the circular defect.
Asc Procedure Text (E): After obtaining clear surgical margins the patient was sent to an [facility] for surgical repair.  The patient understands they will receive post-surgical care and follow-up from the ASC physician.
Skin Substitute Text: The defect edges were debeveled with a #15 scalpel blade. Given the location of the defect, shape of the defect and the proximity to free margins a skin substitute graft was deemed most appropriate. The graft material was trimmed to fit the size of the defect. The graft was then placed in the primary defect and oriented appropriately.
O-Z Plasty Text: The defect edges were debeveled with a #15 scalpel blade. Given the location of the defect, shape of the defect and the proximity to free margins an O-Z plasty (double transposition flap) was deemed most appropriate. Using a sterile surgical marker, the appropriate transposition flaps were drawn incorporating the defect and placing the expected incisions within the relaxed skin tension lines where possible. The area thus outlined was incised deep to adipose tissue with a #15 scalpel blade. The skin margins were undermined to an appropriate distance in all directions utilizing iris scissors. Hemostasis was achieved with electrocautery. The flaps were then transposed into place, one clockwise and the other counterclockwise, and anchored with interrupted buried subcutaneous sutures.
Post-Care Instructions: It was determined that the best option for surgical repair of the [facility] defect was to refer the patient to plastics. The patient was referred to plastics for closure. See repair operative note for details of repair performed. I reviewed with the patient in detail post-care instructions. Patient is not to engage in any heavy lifting, exercise, or swimming for the next 14 days. Should the patient develop any fevers, chills, bleeding, severe pain patient will contact the office immediately.
Cheek-To-Nose Interpolation Flap Text: A decision was made to reconstruct the defect utilizing an interpolation axial flap and a staged reconstruction. A telfa template was made of the defect. This telfa template was then used to outline the Cheek-To-Nose Interpolation flap. The donor area for the pedicle flap was then injected with anesthesia. The flap was excised through the skin and subcutaneous tissue down to the layer of the underlying musculature. The interpolation flap was carefully excised within this deep plane to maintain its blood supply. The edges of the donor site were undermined. The donor site was closed in a primary fashion. The pedicle was then rotated into position and sutured. Once the tube was sutured into place, adequate blood supply was confirmed with blanching and refill. The pedicle was then wrapped with xeroform gauze and dressed appropriately with a telfa and gauze bandage to ensure continued blood supply and protect the attached pedicle.
Oculoplastic Surgeon Procedure Text (E): After obtaining clear surgical margins the patient was sent to oculoplastics for surgical repair.  The patient understands they will receive post-surgical care and follow-up from the referring physician's office.
Scc Ka Subtype Histology Text: there is a cup-shaped exoendophytic epithelial neoplasm characterized by proliferations of keratinocytes with abundant eosinophilic glossy cytoplasm and nuclei with open chromatin in the papillary and upper reticular dermis. Multiple inclusions containing elastic material are seen within the cytoplasm. The features are of squamous cell carcinoma, keratoacanthoma type.
Spiral Flap Text: The defect edges were debeveled with a #15 scalpel blade. Given the location of the defect, shape of the defect and the proximity to free margins a spiral flap was deemed most appropriate. Using a sterile surgical marker, an appropriate rotation flap was drawn incorporating the defect and placing the expected incisions within the relaxed skin tension lines where possible. The area thus outlined was incised deep to adipose tissue with a #15 scalpel blade. The skin margins were undermined to an appropriate distance in all directions utilizing iris scissors.
Island Pedicle Flap With Canthal Suspension Text: The defect edges were debeveled with a #15 scalpel blade. Given the location of the defect, shape of the defect and the proximity to free margins an island pedicle advancement flap was deemed most appropriate. Using a sterile surgical marker, an appropriate advancement flap was drawn incorporating the defect, outlining the appropriate donor tissue and placing the expected incisions within the relaxed skin tension lines where possible. The area thus outlined was incised deep to adipose tissue with a #15 scalpel blade. The skin margins were undermined to an appropriate distance in all directions around the primary defect and laterally outward around the island pedicle utilizing iris scissors. There was minimal undermining beneath the pedicle flap. A suspension suture was placed in the canthal tendon to prevent tension and prevent ectropion.
Consent Type: Consent 1 (Standard)
Ftsg Text: The defect edges were debeveled with a #15 scalpel blade. Given the location of the defect, shape of the defect and the proximity to free margins a full thickness skin graft was deemed most appropriate. Using a sterile surgical marker, the primary defect shape was transferred to the donor site. The area thus outlined was incised deep to adipose tissue with a #15 scalpel blade. The harvested graft was then trimmed of adipose tissue until only dermis and epidermis was left. The skin margins of the secondary defect were undermined to an appropriate distance in all directions utilizing iris scissors. The secondary defect was closed with interrupted buried subcutaneous sutures. The skin edges were then re-apposed with running  sutures. The skin graft was then placed in the primary defect and oriented appropriately.
Complex Repair And Flap Additional Text (Will Appearing After The Standard Complex Repair Text): The complex repair was not sufficient to completely close the primary defect. The remaining additional defect was repaired with the flap mentioned below.
No Residual Tumor Seen Histology Text: There were no malignant cells seen in the sections examined.
Scc Histology Text: There is hyperkeratosis, acanthosis, and cytologic atypia of keratinocytes with hyperchromatic and pleomorphic nuclei throughout the epidermis. No dermal invasion is seen. Chronic inflammation is present in the dermis.
Suturegard Retention Suture: 2-0 Nylon
Graft Cartilage Fenestration Text: The cartilage was fenestrated with a 2mm punch biopsy to help facilitate graft survival and healing.
S Plasty Text: Given the location and shape of the defect, and the orientation of relaxed skin tension lines, an S-plasty was deemed most appropriate for repair. Using a sterile surgical marker, the appropriate outline of the S-plasty was drawn, incorporating the defect and placing the expected incisions within the relaxed skin tension lines where possible. The area thus outlined was incised deep to adipose tissue with a #15 scalpel blade. The skin margins were undermined to an appropriate distance in all directions utilizing iris scissors. The skin flaps were advanced over the defect. The opposing margins were then approximated with interrupted buried subcutaneous sutures.
Information: Selecting Yes will display possible errors in your note based on the variables you have selected. This validation is only offered as a suggestion for you. PLEASE NOTE THAT THE VALIDATION TEXT WILL BE REMOVED WHEN YOU FINALIZE YOUR NOTE. IF YOU WANT TO FAX A PRELIMINARY NOTE YOU WILL NEED TO TOGGLE THIS TO 'NO' IF YOU DO NOT WANT IT IN YOUR FAXED NOTE.
Otolaryngologist Procedure Text (D): After obtaining clear surgical margins the patient was sent to otolaryngology for surgical repair.  The patient understands they will receive post-surgical care and follow-up from the referring physician's office.
Area L Indication Text: Tumors in this location are included in Area L (trunk and extremities). Mohs surgery is indicated for larger tumors, or tumors with aggressive histologic features, in these anatomic locations.
Star Wedge Flap Text: The defect edges were debeveled with a #15 scalpel blade. Given the location of the defect, shape of the defect and the proximity to free margins a star wedge flap was deemed most appropriate. Using a sterile surgical marker, an appropriate rotation flap was drawn incorporating the defect and placing the expected incisions within the relaxed skin tension lines where possible. The area thus outlined was incised deep to adipose tissue with a #15 scalpel blade. The skin margins were undermined to an appropriate distance in all directions utilizing iris scissors.
Mohs Method Verbiage: An incision at a 45 degree angle following the standard Mohs approach was done and the specimen was harvested as a microscopic controlled layer.
Interpolation Flap Text: A decision was made to reconstruct the defect utilizing an interpolation axial flap and a staged reconstruction. A telfa template was made of the defect. This telfa template was then used to outline the interpolation flap. The donor area for the pedicle flap was then injected with anesthesia. The flap was excised through the skin and subcutaneous tissue down to the layer of the underlying musculature. The interpolation flap was carefully excised within this deep plane to maintain its blood supply. The edges of the donor site were undermined. The donor site was closed in a primary fashion. The pedicle was then rotated into position and sutured. Once the tube was sutured into place, adequate blood supply was confirmed with blanching and refill. The pedicle was then wrapped with xeroform gauze and dressed appropriately with a telfa and gauze bandage to ensure continued blood supply and protect the attached pedicle.
Histology Selection Override (Optional- Will Default To Parent Diagnosis If N/A): Basal Cell Carcinoma
Epidermal Closure: simple interrupted
Split-Thickness Skin Graft Text: The defect edges were debeveled with a #15 scalpel blade. Given the location of the defect, shape of the defect and the proximity to free margins a split thickness skin graft was deemed most appropriate. Using a sterile surgical marker, the primary defect shape was transferred to the donor site. The split thickness graft was then harvested. The skin graft was then placed in the primary defect and oriented appropriately.
Complex Repair And Graft Additional Text (Will Appearing After The Standard Complex Repair Text): The complex repair was not sufficient to completely close the primary defect. The remaining additional defect was repaired with the graft mentioned below.
Alar Island Pedicle Flap Text: The defect edges were debeveled with a #15 scalpel blade. Given the location of the defect, shape of the defect and the proximity to the alar rim an island pedicle advancement flap was deemed most appropriate. Using a sterile surgical marker, an appropriate advancement flap was drawn incorporating the defect, outlining the appropriate donor tissue and placing the expected incisions within the nasal ala running parallel to the alar rim. The area thus outlined was incised with a #15 scalpel blade. The skin margins were undermined minimally to an appropriate distance in all directions around the primary defect and laterally outward around the island pedicle utilizing iris scissors. There was minimal undermining beneath the pedicle flap.
Tumor Debulked?: not debulked
Secondary Intention Text (Leave Blank If You Do Not Want): The defect will heal with secondary intention.
Inflammation Suggestive Of Cancer Camouflage Histology Text: There was a dense lymphocytic infiltrate which prevented adequate histologic evaluation of adjacent structures.
V-Y Plasty Text: The defect edges were debeveled with a #15 scalpel blade. Given the location of the defect, shape of the defect and the proximity to free margins an V-Y advancement flap was deemed most appropriate. Using a sterile surgical marker, an appropriate advancement flap was drawn incorporating the defect and placing the expected incisions within the relaxed skin tension lines where possible. The area thus outlined was incised deep to adipose tissue with a #15 scalpel blade. The skin margins were undermined to an appropriate distance in all directions utilizing iris scissors.
Consent (Spinal Accessory)/Introductory Paragraph: The rationale for Mohs was explained to the patient and consent was obtained. The risks, benefits and alternatives to therapy were discussed in detail. Specifically, the risks of damage to the spinal accessory nerve, infection, scarring, bleeding, prolonged wound healing, incomplete removal, allergy to anesthesia, and recurrence were addressed. Prior to the procedure, the treatment site was clearly identified and confirmed by the patient. All components of Universal Protocol/PAUSE Rule completed.
Closure 4 Information: This tab is for additional flaps and grafts above and beyond our usual structured repairs. Please note if you enter information here it will not currently bill and you will need to add the billing information manually.
Plastic Surgeon (A): Mis Kidd MD
Mixed Nodular And Infiltrative Bcc Histology Text: Irregular nests of pleomorphic, hyperchromatic basaloid cells with peripheral palisading infiltrate the tissue in a diffuse fashion in association with sclerotic stroma
Subsequent Stages Histo Method Verbiage: Using a similar technique to that described above, a thin layer of tissue was removed from all areas where tumor was visible on the previous stage. The tissue was again oriented, mapped, dyed, and processed as above.
Stage 2: Number Of Blocks?: 2
Hemostasis: Electrocautery
Bilobed Flap Text: The defect edges were debeveled with a #15 scalpel blade. Given the location of the defect and the proximity to free margins a bilobe flap was deemed most appropriate. Using a sterile surgical marker, an appropriate bilobe flap drawn around the defect. The area thus outlined was incised deep to adipose tissue with a #15 scalpel blade. The skin margins were undermined to an appropriate distance in all directions utilizing iris scissors.
Body Location Override (Optional - Billing Will Still Be Based On Selected Body Map Location If Applicable): left nasal root
Graft Donor Site Bandage (Optional-Leave Blank If You Don't Want In Note): Steri-strips and a pressure bandage were applied to the donor site.
Donor Site Anesthesia Type: same as repair anesthesia
Rhomboid Transposition Flap Text: The defect edges were debeveled with a #15 scalpel blade. Given the location of the defect and the proximity to free margins a rhomboid transposition flap was deemed most appropriate. Using a sterile surgical marker, an appropriate rhomboid flap was drawn incorporating the defect. The area thus outlined was incised deep to adipose tissue with a #15 scalpel blade. The skin margins were undermined to an appropriate distance in all directions utilizing iris scissors.
Detail Level: Detailed
Anesthesia Volume In Cc: 6
Chonodrocutaneous Helical Advancement Flap Text: The defect edges were debeveled with a #15 scalpel blade. Given the location of the defect and the proximity to free margins a chondrocutaneous helical advancement flap was deemed most appropriate. Using a sterile surgical marker, the appropriate advancement flap was drawn incorporating the defect and placing the expected incisions within the relaxed skin tension lines where possible. The area thus outlined was incised deep to adipose tissue with a #15 scalpel blade. The skin margins were undermined to an appropriate distance in all directions utilizing iris scissors.
H Plasty Text: Given the location of the defect, shape of the defect and the proximity to free margins a H-plasty was deemed most appropriate for repair. Using a sterile surgical marker, the appropriate advancement arms of the H-plasty were drawn incorporating the defect and placing the expected incisions within the relaxed skin tension lines where possible. The area thus outlined was incised deep to adipose tissue with a #15 scalpel blade. The skin margins were undermined to an appropriate distance in all directions utilizing iris scissors. The opposing advancement arms were then advanced into place in opposite direction and anchored with interrupted buried subcutaneous sutures.
Location Indication Override (Is Already Calculated Based On Selected Body Location): Area H
Mid-Level (A): Beau Melo PA-C
Consent (Near Eyelid Margin)/Introductory Paragraph: The rationale for Mohs was explained to the patient and consent was obtained. The risks, benefits and alternatives to therapy were discussed in detail. Specifically, the risks of ectropion or eyelid deformity, infection, scarring, bleeding, prolonged wound healing, incomplete removal, allergy to anesthesia, nerve injury and recurrence were addressed. Prior to the procedure, the treatment site was clearly identified and confirmed by the patient. All components of Universal Protocol/PAUSE Rule completed.
Cheiloplasty (Less Than 50%) Text: A decision was made to reconstruct the defect with a  cheiloplasty. The defect was undermined extensively. Additional obicularis oris muscle was excised with a 15 blade scalpel. The defect was converted into a full thickness wedge, of less than 50% of the vertical height of the lip, to facilite a better cosmetic result. Small vessels were then tied off with 5-0 monocyrl. The obicularis oris, superficial fascia, adipose and dermis were then reapproximated. After the deeper layers were approximated the epidermis was reapproximated with particular care given to realign the vermilion border.
Bilobed Transposition Flap Text: The defect edges were debeveled with a #15 scalpel blade. Given the location of the defect and the proximity to free margins a bilobed transposition flap was deemed most appropriate. Using a sterile surgical marker, an appropriate bilobe flap drawn around the defect. The area thus outlined was incised deep to adipose tissue with a #15 scalpel blade. The skin margins were undermined to an appropriate distance in all directions utilizing iris scissors.
Mohs Rapid Report Verbiage: The area of clinically evident tumor was marked with skin marking ink and appropriately hatched. The initial incision was made following the Mohs approach through the skin. The specimen was taken to the lab, divided into the necessary number of pieces, chromacoded and processed according to the Mohs protocol. This was repeated in successive stages until a tumor free defect was achieved.
Bi-Rhombic Flap Text: The defect edges were debeveled with a #15 scalpel blade. Given the location of the defect and the proximity to free margins a bi-rhombic flap was deemed most appropriate. Using a sterile surgical marker, an appropriate rhombic flap was drawn incorporating the defect. The area thus outlined was incised deep to adipose tissue with a #15 scalpel blade. The skin margins were undermined to an appropriate distance in all directions utilizing iris scissors.
Non-Graft Cartilage Fenestration Text: The cartilage was fenestrated with a 2mm punch biopsy to help facilitate healing.
Crescentic Advancement Flap Text: The defect edges were debeveled with a #15 scalpel blade. Given the location of the defect and the proximity to free margins a crescentic advancement flap was deemed most appropriate. Using a sterile surgical marker, the appropriate advancement flap was drawn incorporating the defect and placing the expected incisions within the relaxed skin tension lines where possible. The area thus outlined was incised deep to adipose tissue with a #15 scalpel blade. The skin margins were undermined to an appropriate distance in all directions utilizing iris scissors.
V-Y Flap Text: The defect edges were debeveled with a #15 scalpel blade. Given the location of the defect, shape of the defect and the proximity to free margins a V-Y flap was deemed most appropriate. Using a sterile surgical marker, an appropriate advancement flap was drawn incorporating the defect and placing the expected incisions within the relaxed skin tension lines where possible. The area thus outlined was incised deep to adipose tissue with a #15 scalpel blade. The skin margins were undermined to an appropriate distance in all directions utilizing iris scissors.
Consent 1/Introductory Paragraph: Various treatment options for skin cancer treatment; including but not limited to no treatment, cryosurgery or cryotherapy, excision, radiation therapy, electrodessication and curettage, topical therapeutic agents and light therapy were discussed in depth with the patient. The rationale for Mohs was explained to the patient and consent was obtained. The risks, benefits and alternatives to therapy were discussed in detail. Specifically, the risks of infection, scarring, bleeding, prolonged wound healing, incomplete removal, allergy to anesthesia, nerve injury and recurrence were addressed. Prior to the procedure, the treatment site was clearly identified and confirmed by the patient and the patient agreed that Mohs surgery is the best treatment option for their lesion. No guarantees were made or implied; close follow-up was stressed out to the patient. All components of Universal Protocol/PAUSE Rule completed. treatment site was clearly identified and confirmed by the patient. All components of Universal Protocol/PAUSE Rule completed.
Surgical Defect Length In Cm (Optional): 1.6
Partial Purse String (Intermediate) Text: Given the location of the defect and the characteristics of the surrounding skin an intermediate purse string closure was deemed most appropriate. Undermining was performed circumfirentially around the surgical defect. A purse string suture was then placed and tightened. Wound tension only allowed a partial closure of the circular defect.
Helical Rim Advancement Flap Text: The defect edges were debeveled with a #15 blade scalpel. Given the location of the defect and the proximity to free margins (helical rim) a double helical rim advancement flap was deemed most appropriate. Using a sterile surgical marker, the appropriate advancement flaps were drawn incorporating the defect and placing the expected incisions between the helical rim and antihelix where possible. The area thus outlined was incised through and through with a #15 scalpel blade. With a skin hook and iris scissors, the flaps were gently and sharply undermined and freed up.
Depth Of Tumor Invasion (For Histology): dermis
Retention Suture Bite Size: 3 mm
Advancement-Rotation Flap Text: The defect edges were debeveled with a #15 scalpel blade. Given the location of the defect, shape of the defect and the proximity to free margins an advancement-rotation flap was deemed most appropriate. Using a sterile surgical marker, an appropriate flap was drawn incorporating the defect and placing the expected incisions within the relaxed skin tension lines where possible. The area thus outlined was incised deep to adipose tissue with a #15 scalpel blade. The skin margins were undermined to an appropriate distance in all directions utilizing iris scissors.
Consent 2/Introductory Paragraph: Mohs surgery was explained to the patient and consent was obtained. The risks, benefits and alternatives to therapy were discussed in detail. Specifically, the risks of infection, scarring, bleeding, prolonged wound healing, incomplete removal, allergy to anesthesia, nerve injury and recurrence were addressed. Prior to the procedure, the treatment site was clearly identified and confirmed by the patient. All components of Universal Protocol/PAUSE Rule completed.
A-T Advancement Flap Text: The defect edges were debeveled with a #15 scalpel blade. Given the location of the defect, shape of the defect and the proximity to free margins an A-T advancement flap was deemed most appropriate. Using a sterile surgical marker, an appropriate advancement flap was drawn incorporating the defect and placing the expected incisions within the relaxed skin tension lines where possible. The area thus outlined was incised deep to adipose tissue with a #15 scalpel blade. The skin margins were undermined to an appropriate distance in all directions utilizing iris scissors.
Suture Removal: 7 days
Surgeon Performing Repair (Optional): Juan José Mcleod MD
Transposition Flap Text: The defect edges were debeveled with a #15 scalpel blade. Given the location of the defect and the proximity to free margins a transposition flap was deemed most appropriate. Using a sterile surgical marker, an appropriate transposition flap was drawn incorporating the defect. The area thus outlined was incised deep to adipose tissue with a #15 scalpel blade. The skin margins were undermined to an appropriate distance in all directions utilizing iris scissors.
Surgeon/Pathologist Verbiage (Will Incorporate Name Of Surgeon From Intro If Not Blank): operated in two distinct and integrated capacities as the surgeon and pathologist.
Melolabial Interpolation Flap Text: A decision was made to reconstruct the defect utilizing an interpolation axial flap and a staged reconstruction. A telfa template was made of the defect. This telfa template was then used to outline the melolabial interpolation flap. The donor area for the pedicle flap was then injected with anesthesia. The flap was excised through the skin and subcutaneous tissue down to the layer of the underlying musculature. The pedicle flap was carefully excised within this deep plane to maintain its blood supply. The edges of the donor site were undermined. The donor site was closed in a primary fashion. The pedicle was then rotated into position and sutured. Once the tube was sutured into place, adequate blood supply was confirmed with blanching and refill. The pedicle was then wrapped with xeroform gauze and dressed appropriately with a telfa and gauze bandage to ensure continued blood supply and protect the attached pedicle.
Cartilage Graft Text: The defect edges were debeveled with a #15 scalpel blade. Given the location of the defect, shape of the defect, the fact the defect involved a full thickness cartilage defect a cartilage graft was deemed most appropriate. An appropriate donor site was identified, cleansed, and anesthetized. The cartilage graft was then harvested and transferred to the recipient site, oriented appropriately and then sutured into place. The secondary defect was then repaired using a primary closure.
Surgical Defect Length In Cm (Optional): 1.1
Double Island Pedicle Flap Text: The defect edges were debeveled with a #15 scalpel blade. Given the location of the defect, shape of the defect and the proximity to free margins a double island pedicle advancement flap was deemed most appropriate. Using a sterile surgical marker, an appropriate advancement flap was drawn incorporating the defect, outlining the appropriate donor tissue and placing the expected incisions within the relaxed skin tension lines where possible. The area thus outlined was incised deep to adipose tissue with a #15 scalpel blade. The skin margins were undermined to an appropriate distance in all directions around the primary defect and laterally outward around the island pedicle utilizing iris scissors. There was minimal undermining beneath the pedicle flap.
Anesthesia Volume In Cc: 4.5
Melanoma In Situ Histology Text: On a sun-damaged skin, there is a broad and asymmetrical proliferation of enlarged and atypical melanocytes present continuously as single cells and in small irregular coalescing nests along the dermoepidermal junction. The melanocytes extend into the superficial portions of epithelial structures of adnexa. Pagetoid spread of melanocytes is appreciated. Occasional mitotic figures and individually necrotic melanocytes are also noted. In the underlying papillary dermis, there is mild lymphocytic inflammatory infiltrate with prominent dermal fibroplasia and melanophages.
No Repair - Repaired With Adjacent Surgical Defect Text (Leave Blank If You Do Not Want): After obtaining clear surgical margins the defect was repaired concurrently with another surgical defect which was in close approximation.
O-T Advancement Flap Text: The defect edges were debeveled with a #15 scalpel blade. Given the location of the defect, shape of the defect and the proximity to free margins an O-T advancement flap was deemed most appropriate. Using a sterile surgical marker, an appropriate advancement flap was drawn incorporating the defect and placing the expected incisions within the relaxed skin tension lines where possible. The area thus outlined was incised deep to adipose tissue with a #15 scalpel blade. The skin margins were undermined to an appropriate distance in all directions utilizing iris scissors.
Deep Sutures: 5-0 Vicryl
Wound Care (No Sutures): Petrolatum
Mohs Case Number: [id number]
Primary Defect Width In Cm (Final Defect Size - Required For Flaps/Grafts): 1.5
Mohs Histo Method Verbiage: Each section was then chromacoded and processed in the Mohs lab using the Mohs protocol and submitted for frozen section.
Mastoid Interpolation Flap Text: A decision was made to reconstruct the defect utilizing an interpolation axial flap and a staged reconstruction. A telfa template was made of the defect. This telfa template was then used to outline the mastoid interpolation flap. The donor area for the pedicle flap was then injected with anesthesia. The flap was excised through the skin and subcutaneous tissue down to the layer of the underlying musculature. The pedicle flap was carefully excised within this deep plane to maintain its blood supply. The edges of the donor site were undermined. The donor site was closed in a primary fashion. The pedicle was then rotated into position and sutured. Once the tube was sutured into place, adequate blood supply was confirmed with blanching and refill. The pedicle was then wrapped with xeroform gauze and dressed appropriately with a telfa and gauze bandage to ensure continued blood supply and protect the attached pedicle.
Muscle Hinge Flap Text: The defect edges were debeveled with a #15 scalpel blade. Given the size, depth and location of the defect and the proximity to free margins a muscle hinge flap was deemed most appropriate. Using a sterile surgical marker, an appropriate hinge flap was drawn incorporating the defect. The area thus outlined was incised with a #15 scalpel blade. The skin margins were undermined to an appropriate distance in all directions utilizing iris scissors.
Advancement Flap (Single) Text: The defect edges were debeveled with a #15 scalpel blade. Given the location of the defect and the proximity to free margins a single advancement flap was deemed most appropriate. Using a sterile surgical marker, an appropriate advancement flap was drawn incorporating the defect and placing the expected incisions within the relaxed skin tension lines where possible. The area thus outlined was incised deep to adipose tissue with a #15 scalpel blade. The skin margins were undermined to an appropriate distance in all directions utilizing iris scissors.
Tissue Cultured Epidermal Autograft Text: The defect edges were debeveled with a #15 scalpel blade. Given the location of the defect, shape of the defect and the proximity to free margins a tissue cultured epidermal autograft was deemed most appropriate. The graft was then trimmed to fit the size of the defect. The graft was then placed in the primary defect and oriented appropriately.
Island Pedicle Flap-Requiring Vessel Identification Text: The defect edges were debeveled with a #15 scalpel blade. Given the location of the defect, shape of the defect and the proximity to free margins an island pedicle advancement flap was deemed most appropriate. Using a sterile surgical marker, an appropriate advancement flap was drawn, based on the axial vessel mentioned above, incorporating the defect, outlining the appropriate donor tissue and placing the expected incisions within the relaxed skin tension lines where possible. The area thus outlined was incised deep to adipose tissue with a #15 scalpel blade. The skin margins were undermined to an appropriate distance in all directions around the primary defect and laterally outward around the island pedicle utilizing iris scissors. There was minimal undermining beneath the pedicle flap.
Composite Graft Text: The defect edges were debeveled with a #15 scalpel blade. Given the location of the defect, shape of the defect, the proximity to free margins and the fact the defect was full thickness a composite graft was deemed most appropriate. The defect was outline and then transferred to the donor site. A full thickness graft was then excised from the donor site. The graft was then placed in the primary defect, oriented appropriately and then sutured into place. The secondary defect was then repaired using a primary closure.
Pain Refusal Text: I offered to prescribe pain medication but the patient refused to take this medication.
W Plasty Text: The lesion was extirpated to the level of the fat with a #15 scalpel blade. Given the location of the defect, shape of the defect and the proximity to free margins a W-plasty was deemed most appropriate for repair. Using a sterile surgical marker, the appropriate transposition arms of the W-plasty were drawn incorporating the defect and placing the expected incisions within the relaxed skin tension lines where possible. The area thus outlined was incised deep to adipose tissue with a #15 scalpel blade. The skin margins were undermined to an appropriate distance in all directions utilizing iris scissors. The opposing transposition arms were then transposed into place in opposite direction and anchored with interrupted buried subcutaneous sutures.
Closure 3 Information: This tab is for additional flaps and grafts above and beyond our usual structured repairs.  Please note if you enter information here it will not currently bill and you will need to add the billing information manually.
Scc Poorly Differentiated Histology Text: there are strands and nests of pleomorphic cells present in the infiltrative pattern. Mitotic activity is brisk. There is vascular proliferation and acute and chronic inflammation in the dermis. The findings are those of a poorly differentiated squamous cell carcinoma.
Estimated Blood Loss (Cc): minimal
Consent (Ear)/Introductory Paragraph: The rationale for Mohs was explained to the patient and consent was obtained. The risks, benefits and alternatives to therapy were discussed in detail. Specifically, the risks of ear deformity, infection, scarring, bleeding, prolonged wound healing, incomplete removal, allergy to anesthesia, nerve injury and recurrence were addressed. Prior to the procedure, the treatment site was clearly identified and confirmed by the patient. All components of Universal Protocol/PAUSE Rule completed.
Trilobed Flap Text: The defect edges were debeveled with a #15 scalpel blade. Given the location of the defect and the proximity to free margins a trilobed flap was deemed most appropriate. Using a sterile surgical marker, an appropriate trilobed flap drawn around the defect. The area thus outlined was incised deep to adipose tissue with a #15 scalpel blade. The skin margins were undermined to an appropriate distance in all directions utilizing iris scissors.
Purse String (Simple) Text: Given the location of the defect and the characteristics of the surrounding skin a pursestring closure was deemed most appropriate. Undermining was performed circumfirentially around the surgical defect. A purstring suture was then placed and tightened.
Cheiloplasty (Complex) Text: A decision was made to reconstruct the defect with a  cheiloplasty. The defect was undermined extensively. Additional obicularis oris muscle was excised with a 15 blade scalpel. The defect was converted into a full thickness wedge to facilite a better cosmetic result. Small vessels were then tied off with 5-0 monocyrl. The obicularis oris, superficial fascia, adipose and dermis were then reapproximated. After the deeper layers were approximated the epidermis was reapproximated with particular care given to realign the vermilion border.
Epidermal Autograft Text: The defect edges were debeveled with a #15 scalpel blade. Given the location of the defect, shape of the defect and the proximity to free margins an epidermal autograft was deemed most appropriate. Using a sterile surgical marker, the primary defect shape was transferred to the donor site. The epidermal graft was then harvested. The skin graft was then placed in the primary defect and oriented appropriately.
Closure 2 Information: This tab is for additional flaps and grafts, including complex repair and grafts and complex repair and flaps. You can also specify a different location for the additional defect, if the location is the same you do not need to select a new one. We will insert the automated text for the repair you select below just as we do for solitary flaps and grafts. Please note that at this time if you select a location with a different insurance zone you will need to override the ICD10 and CPT if appropriate.
Keystone Flap Text: The defect edges were debeveled with a #15 scalpel blade. Given the location of the defect, shape of the defect a keystone flap was deemed most appropriate. Using a sterile surgical marker, an appropriate keystone flap was drawn incorporating the defect, outlining the appropriate donor tissue and placing the expected incisions within the relaxed skin tension lines where possible. The area thus outlined was incised deep to adipose tissue with a #15 scalpel blade. The skin margins were undermined to an appropriate distance in all directions around the primary defect and laterally outward around the flap utilizing iris scissors.
Mauc Instructions: By selecting yes to the question below the [facility] number will be added into the note. This will be calculated automatically based on the diagnosis chosen, the size entered, the body zone selected (H,M,L) and the specific indications you chose. You will also have the option to override the Mohs AUC if you disagree with the automatically calculated number and this option is found in the Case Summary tab.
Z Plasty Text: The lesion was extirpated to the level of the fat with a #15 scalpel blade. Given the location of the defect, shape of the defect and the proximity to free margins a Z-plasty was deemed most appropriate for repair. Using a sterile surgical marker, the appropriate transposition arms of the Z-plasty were drawn incorporating the defect and placing the expected incisions within the relaxed skin tension lines where possible. The area thus outlined was incised deep to adipose tissue with a #15 scalpel blade. The skin margins were undermined to an appropriate distance in all directions utilizing iris scissors. The opposing transposition arms were then transposed into place in opposite direction and anchored with interrupted buried subcutaneous sutures.
Ear Wedge Repair Text: A wedge excision was completed by carrying down an excision through the full thickness of the ear and cartilage with an inward facing Burow's triangle. The wound was then closed in a layered fashion.
Hatchet Flap Text: The defect edges were debeveled with a #15 scalpel blade. Given the location of the defect, shape of the defect and the proximity to free margins a hatchet flap was deemed most appropriate. Using a sterile surgical marker, an appropriate hatchet flap was drawn incorporating the defect and placing the expected incisions within the relaxed skin tension lines where possible. The area thus outlined was incised deep to adipose tissue with a #15 scalpel blade. The skin margins were undermined to an appropriate distance in all directions utilizing iris scissors.
Bcc  Morpheaform/Sclerosing Histology Text: irregular nests of pleomorphic, hyperchromatic basaloid cells with peripheral palisading infiltrate the tissue in a diffuse fashion in association with a mucoid stroma and dense dermal sclerosis.  The tumor infiltrates in thin strands and single cells among the sclerotic collagen fibers in a pattern of morpheaform variant of basal cell carcinoma
Initial Size Of Lesion: 1.2
Purse String (Intermediate) Text: Given the location of the defect and the characteristics of the surrounding skin a pursestring intermediate closure was deemed most appropriate. Undermining was performed circumfirentially around the surgical defect. A purstring suture was then placed and tightened.
Localized Dermabrasion Text: The patient was draped in routine manner. Localized dermabrasion using 3 x 17 mm wire brush was performed in routine manner to papillary dermis. This spot dermabrasion is being performed to complete skin cancer reconstruction. It also will eliminate the other sun damaged precancerous cells that are known to be part of the regional effect of a lifetime's worth of sun exposure. This localized dermabrasion is therapeutic and should not be considered cosmetic in any regard.
Dorsal Nasal Flap Text: The defect edges were debeveled with a #15 scalpel blade. Given the location of the defect and the proximity to free margins a dorsal nasal flap was deemed most appropriate. Using a sterile surgical marker, an appropriate dorsal nasal flap was drawn around the defect. The area thus outlined was incised deep to adipose tissue with a #15 scalpel blade. The skin margins were undermined to an appropriate distance in all directions utilizing iris scissors.
Bilateral Helical Rim Advancement Flap Text: The defect edges were debeveled with a #15 blade scalpel. Given the location of the defect and the proximity to free margins (helical rim) a bilateral helical rim advancement flap was deemed most appropriate. Using a sterile surgical marker, the appropriate advancement flaps were drawn incorporating the defect and placing the expected incisions between the helical rim and antihelix where possible. The area thus outlined was incised through and through with a #15 scalpel blade. With a skin hook and iris scissors, the flaps were gently and sharply undermined and freed up.
Mercedes Flap Text: The defect edges were debeveled with a #15 scalpel blade. Given the location of the defect, shape of the defect and the proximity to free margins a Mercedes flap was deemed most appropriate. Using a sterile surgical marker, an appropriate advancement flap was drawn incorporating the defect and placing the expected incisions within the relaxed skin tension lines where possible. The area thus outlined was incised deep to adipose tissue with a #15 scalpel blade. The skin margins were undermined to an appropriate distance in all directions utilizing iris scissors.
Consent 3/Introductory Paragraph: I gave the patient a chance to ask questions they had about the procedure. Following this I explained the Mohs procedure and consent was obtained. The risks, benefits and alternatives to therapy were discussed in detail. Specifically, the risks of infection, scarring, bleeding, prolonged wound healing, incomplete removal, allergy to anesthesia, nerve injury and recurrence were addressed. Prior to the procedure, the treatment site was clearly identified and confirmed by the patient. All components of Universal Protocol/PAUSE Rule completed.
Consent (Nose)/Introductory Paragraph: The rationale for Mohs was explained to the patient and consent was obtained. The risks, benefits and alternatives to therapy were discussed in detail. Specifically, the risks of nasal deformity, changes in the flow of air through the nose, infection, scarring, bleeding, prolonged wound healing, incomplete removal, allergy to anesthesia, nerve injury and recurrence were addressed. Prior to the procedure, the treatment site was clearly identified and confirmed by the patient. All components of Universal Protocol/PAUSE Rule completed.
Area H Indication Text: Tumors in this location are included in Area H (eyelids, eyebrows, nose, lips, chin, ear, pre-auricular, post-auricular, temple, genitalia, hands, feet, ankles and areola). Tissue conservation is critical in these anatomic locations.
Postop Diagnosis: same
Surgical Defect Width In Cm (Optional): 1.3
Bcc Superficial Histology Text: Beneath an irregular epidermis, there are small nodular masses of basaloid neoplastic cells with nuclear pleomorphism attached to the basal layer and surrounded by a loose fibrous stroma and mild inflammation in the superficial dermis. The findings are typical for a superficial type of basal cell carcinoma.

## 2019-09-25 ENCOUNTER — APPOINTMENT (RX ONLY)
Dept: URBAN - METROPOLITAN AREA CLINIC 116 | Facility: CLINIC | Age: 69
Setting detail: DERMATOLOGY
End: 2019-09-25

## 2019-09-25 DIAGNOSIS — Z48.817 ENCOUNTER FOR SURGICAL AFTERCARE FOLLOWING SURGERY ON THE SKIN AND SUBCUTANEOUS TISSUE: ICD-10-CM

## 2019-09-25 PROCEDURE — 99024 POSTOP FOLLOW-UP VISIT: CPT

## 2019-09-25 PROCEDURE — ? POST-OP WOUND CHECK

## 2019-09-25 ASSESSMENT — LOCATION DETAILED DESCRIPTION DERM: LOCATION DETAILED: NASAL ROOT

## 2019-09-25 ASSESSMENT — LOCATION SIMPLE DESCRIPTION DERM: LOCATION SIMPLE: NOSE

## 2019-09-25 ASSESSMENT — LOCATION ZONE DERM: LOCATION ZONE: NOSE

## 2019-09-25 NOTE — PROCEDURE: POST-OP WOUND CHECK
Detail Level: Detailed
Add 91226 Cpt? (Important Note: In 2017 The Use Of 38827 Is Being Tracked By Cms To Determine Future Global Period Reimbursement For Global Periods): no
Body Location Override (Optional - Billing Will Still Be Based On Selected Body Map Location If Applicable): lt nasal root
Wound Evaluated By: Dr. Mahi Olivas

## 2021-01-15 DIAGNOSIS — Z23 NEED FOR VACCINATION: ICD-10-CM

## 2021-01-19 ENCOUNTER — APPOINTMENT (RX ONLY)
Dept: URBAN - METROPOLITAN AREA CLINIC 121 | Facility: CLINIC | Age: 71
Setting detail: DERMATOLOGY
End: 2021-01-19

## 2021-01-19 DIAGNOSIS — L57.0 ACTINIC KERATOSIS: ICD-10-CM

## 2021-01-19 DIAGNOSIS — L21.8 OTHER SEBORRHEIC DERMATITIS: ICD-10-CM | Status: INADEQUATELY CONTROLLED

## 2021-01-19 DIAGNOSIS — Z85.820 PERSONAL HISTORY OF MALIGNANT MELANOMA OF SKIN: ICD-10-CM

## 2021-01-19 DIAGNOSIS — L82.1 OTHER SEBORRHEIC KERATOSIS: ICD-10-CM

## 2021-01-19 DIAGNOSIS — D485 NEOPLASM OF UNCERTAIN BEHAVIOR OF SKIN: ICD-10-CM

## 2021-01-19 PROBLEM — D48.5 NEOPLASM OF UNCERTAIN BEHAVIOR OF SKIN: Status: ACTIVE | Noted: 2021-01-19

## 2021-01-19 PROCEDURE — 17003 DESTRUCT PREMALG LES 2-14: CPT

## 2021-01-19 PROCEDURE — ? LIQUID NITROGEN

## 2021-01-19 PROCEDURE — ? BIOPSY BY SHAVE METHOD

## 2021-01-19 PROCEDURE — ? PRESCRIPTION

## 2021-01-19 PROCEDURE — 11102 TANGNTL BX SKIN SINGLE LES: CPT

## 2021-01-19 PROCEDURE — 99214 OFFICE O/P EST MOD 30 MIN: CPT | Mod: 25

## 2021-01-19 PROCEDURE — 17000 DESTRUCT PREMALG LESION: CPT | Mod: 59

## 2021-01-19 RX ORDER — KETOCONAZOLE 20 MG/G
CREAM TOPICAL BID
Qty: 1 | Refills: 1 | Status: ERX | COMMUNITY
Start: 2021-01-19

## 2021-01-19 RX ADMIN — KETOCONAZOLE: 20 CREAM TOPICAL at 00:00

## 2021-01-19 ASSESSMENT — LOCATION ZONE DERM
LOCATION ZONE: FACE
LOCATION ZONE: ARM
LOCATION ZONE: NECK
LOCATION ZONE: TRUNK

## 2021-01-19 ASSESSMENT — LOCATION DETAILED DESCRIPTION DERM
LOCATION DETAILED: LEFT CLAVICULAR SKIN
LOCATION DETAILED: RIGHT SUPERIOR MEDIAL FOREHEAD
LOCATION DETAILED: RIGHT SUPERIOR UPPER BACK
LOCATION DETAILED: LEFT DISTAL DORSAL FOREARM
LOCATION DETAILED: LEFT CLAVICULAR NECK
LOCATION DETAILED: LEFT SUPERIOR CENTRAL MALAR CHEEK
LOCATION DETAILED: RIGHT MEDIAL UPPER BACK

## 2021-01-19 ASSESSMENT — LOCATION SIMPLE DESCRIPTION DERM
LOCATION SIMPLE: LEFT FOREARM
LOCATION SIMPLE: LEFT CLAVICULAR SKIN
LOCATION SIMPLE: LEFT ANTERIOR NECK
LOCATION SIMPLE: LEFT CHEEK
LOCATION SIMPLE: RIGHT UPPER BACK
LOCATION SIMPLE: RIGHT FOREHEAD

## 2021-01-19 NOTE — PROCEDURE: LIQUID NITROGEN
Detail Level: Simple
Render Post-Care Instructions In Note?: no
Post-Care Instructions: I reviewed with the patient in detail post-care instructions. Patient is to wear sunprotection, and avoid picking at any of the treated lesions. Pt may apply Vaseline to crusted or scabbing areas.
Number Of Freeze-Thaw Cycles: 2 freeze-thaw cycles
Consent: The patient's consent was obtained including but not limited to risks of crusting, scabbing, blistering, scarring, darker or lighter pigmentary change, recurrence, incomplete removal and infection.
Duration Of Freeze Thaw-Cycle (Seconds): 10

## 2021-01-19 NOTE — PROCEDURE: BIOPSY BY SHAVE METHOD
Detail Level: Detailed
Depth Of Biopsy: dermis
Was A Bandage Applied: Yes
Size Of Lesion In Cm: 0
Biopsy Type: H and E
Biopsy Method: Dermablade
Anesthesia Type: 1% lidocaine without epinephrine
Hemostasis: Drysol
Wound Care: Bacitracin
Dressing: bandage
Destruction After The Procedure: No
Type Of Destruction Used: Curettage
Cryotherapy Text: The wound bed was treated with cryotherapy after the biopsy was performed.
Electrodesiccation Text: The wound bed was treated with electrodesiccation after the biopsy was performed.
Electrodesiccation And Curettage Text: The wound bed was treated with electrodesiccation and curettage after the biopsy was performed.
Silver Nitrate Text: The wound bed was treated with silver nitrate after the biopsy was performed.
Lab: [facility]
Lab Facility: [id number]
Consent: Written consent was obtained and risks were reviewed including but not limited to scarring, infection, bleeding, scabbing, incomplete removal, nerve damage and allergy to anesthesia.
Post-Care Instructions: I reviewed with the patient in detail post-care instructions. Patient is to keep the biopsy site dry overnight, and then apply bacitracin twice daily until healed. Patient may apply hydrogen peroxide soaks to remove any crusting.
Notification Instructions: Patient will be notified of biopsy results. However, patient instructed to call the office if not contacted within 2 weeks.
Billing Type: [employer]
Information: Selecting Yes will display possible errors in your note based on the variables you have selected. This validation is only offered as a suggestion for you. PLEASE NOTE THAT THE VALIDATION TEXT WILL BE REMOVED WHEN YOU FINALIZE YOUR NOTE. IF YOU WANT TO FAX A PRELIMINARY NOTE YOU WILL NEED TO TOGGLE THIS TO 'NO' IF YOU DO NOT WANT IT IN YOUR FAXED NOTE.

## 2021-04-27 RX ORDER — KETOCONAZOLE 20 MG/G
1 CREAM TOPICAL BID
Qty: 1 | Refills: 2 | Status: ERX

## 2021-05-04 RX ORDER — KETOCONAZOLE 20 MG/G
CREAM TOPICAL BID
Qty: 1 | Refills: 2 | Status: CANCELLED
Stop reason: CLARIF

## 2021-10-01 ENCOUNTER — OFFICE VISIT (OUTPATIENT)
Dept: URBAN - METROPOLITAN AREA CLINIC 121 | Facility: CLINIC | Age: 71
End: 2021-10-01

## 2021-12-30 ENCOUNTER — OFFICE VISIT (OUTPATIENT)
Dept: URBAN - METROPOLITAN AREA CLINIC 63 | Facility: CLINIC | Age: 71
End: 2021-12-30

## 2022-01-18 ENCOUNTER — TELEPHONE (OUTPATIENT)
Dept: HEALTH INFORMATION MANAGEMENT | Facility: OTHER | Age: 72
End: 2022-01-18

## 2022-01-19 PROBLEM — Z77.098 AGENT ORANGE EXPOSURE: Status: ACTIVE | Noted: 2022-01-19

## 2022-01-19 PROBLEM — E78.5 DYSLIPIDEMIA: Status: ACTIVE | Noted: 2022-01-19

## 2022-01-19 PROBLEM — E66.3 OVERWEIGHT WITH BODY MASS INDEX (BMI) OF 27 TO 27.9 IN ADULT: Status: ACTIVE | Noted: 2022-01-19

## 2022-01-19 PROBLEM — R73.03 PREDIABETES: Status: ACTIVE | Noted: 2022-01-19

## 2022-02-21 ENCOUNTER — OFFICE VISIT (OUTPATIENT)
Dept: URBAN - METROPOLITAN AREA CLINIC 63 | Facility: CLINIC | Age: 72
End: 2022-02-21

## 2022-03-23 ENCOUNTER — OFFICE VISIT (OUTPATIENT)
Dept: URBAN - METROPOLITAN AREA CLINIC 60 | Facility: CLINIC | Age: 72
End: 2022-03-23

## 2022-05-03 ENCOUNTER — OFFICE VISIT (OUTPATIENT)
Dept: URBAN - METROPOLITAN AREA CLINIC 63 | Facility: CLINIC | Age: 72
End: 2022-05-03

## 2022-05-10 ENCOUNTER — HOSPITAL ENCOUNTER (OUTPATIENT)
Dept: RADIOLOGY | Facility: MEDICAL CENTER | Age: 72
End: 2022-05-10
Attending: ORTHOPAEDIC SURGERY
Payer: MEDICARE

## 2022-05-10 DIAGNOSIS — M17.0 BILATERAL PRIMARY OSTEOARTHRITIS OF KNEE: ICD-10-CM

## 2022-05-10 PROCEDURE — 73700 CT LOWER EXTREMITY W/O DYE: CPT | Mod: RT,MH

## 2022-07-09 ENCOUNTER — TELEPHONE ENCOUNTER (OUTPATIENT)
Dept: URBAN - METROPOLITAN AREA CLINIC 121 | Facility: CLINIC | Age: 72
End: 2022-07-09

## 2022-07-09 RX ORDER — ROPINIROLE 0.25 MG/1
TABLET, FILM COATED ORAL THREE TIMES A DAY
Refills: 0 | OUTPATIENT
Start: 2016-07-15 | End: 2022-05-03

## 2022-07-09 RX ORDER — ROSUVASTATIN CALCIUM 20 MG
TABLET ORAL ONCE A DAY
Refills: 0 | OUTPATIENT
Start: 2016-07-15 | End: 2022-05-03

## 2022-07-09 RX ORDER — METFORMIN HYDROCHLORIDE 850 MG/1
TABLET ORAL ONCE A DAY
Refills: 0 | OUTPATIENT
Start: 2016-07-15 | End: 2022-05-03

## 2022-07-09 RX ORDER — TAMSULOSIN HYDROCHLORIDE 0.4 MG/1
CAPSULE ORAL TWICE A DAY
Refills: 0 | OUTPATIENT
Start: 2016-07-15 | End: 2022-05-03

## 2022-07-09 RX ORDER — AMLODIPINE BESYLATE 5 MG/1
TABLET ORAL TWICE A DAY
Refills: 0 | OUTPATIENT
Start: 2016-07-15 | End: 2022-05-03

## 2022-07-09 RX ORDER — CELECOXIB 400 MG
CAPSULE ORAL ONCE A DAY
Refills: 0 | OUTPATIENT
Start: 2016-07-15 | End: 2022-05-03

## 2022-07-09 RX ORDER — ROPINIROLE 0.25 MG/1
TABLET, FILM COATED ORAL ONCE A DAY
Refills: 0 | OUTPATIENT
Start: 2016-07-15 | End: 2016-07-15

## 2022-07-10 ENCOUNTER — TELEPHONE ENCOUNTER (OUTPATIENT)
Dept: URBAN - METROPOLITAN AREA CLINIC 121 | Facility: CLINIC | Age: 72
End: 2022-07-10

## 2022-07-10 RX ORDER — CLOPIDOGREL 75 MG/1
TABLET ORAL ONCE A DAY
Refills: 0 | Status: ACTIVE | COMMUNITY
Start: 2022-03-24

## 2022-07-10 RX ORDER — TAMSULOSIN HYDROCHLORIDE 0.4 MG/1
CAPSULE ORAL ONCE A DAY
Refills: 0 | Status: ACTIVE | COMMUNITY
Start: 2022-02-08

## 2022-07-10 RX ORDER — PANTOPRAZOLE SODIUM 40 MG/1
TABLET, DELAYED RELEASE ORAL ONCE A DAY
Refills: 0 | Status: ACTIVE | COMMUNITY
Start: 2022-02-03

## 2022-07-10 RX ORDER — FLUTICASONE FUROATE, UMECLIDINIUM BROMIDE AND VILANTEROL TRIFENATATE 100; 62.5; 25 UG/1; UG/1; UG/1
POWDER RESPIRATORY (INHALATION) ONCE A DAY
Refills: 0 | Status: ACTIVE | COMMUNITY
Start: 2022-03-26

## 2022-07-10 RX ORDER — CHLORHEXIDINE GLUCONATE 4 %
LIQUID (ML) TOPICAL ONCE A DAY
Refills: 0 | Status: ACTIVE | COMMUNITY
Start: 2022-05-03

## 2022-07-10 RX ORDER — LATANOPROST/PF 0.005 %
DROPS OPHTHALMIC (EYE) TAKE AS DIRECTED
Refills: 0 | Status: ACTIVE | COMMUNITY
Start: 2022-04-26

## 2022-07-10 RX ORDER — FUROSEMIDE 20 MG/1
TABLET ORAL ONCE A DAY
Refills: 0 | Status: ACTIVE | COMMUNITY
Start: 2022-02-03

## 2022-07-10 RX ORDER — ROSUVASTATIN CALCIUM 20 MG/1
TABLET, FILM COATED ORAL ONCE A DAY
Refills: 0 | Status: ACTIVE | COMMUNITY
Start: 2022-02-03

## 2022-07-10 RX ORDER — METOPROLOL TARTRATE 25 MG/1
TABLET, FILM COATED ORAL ONCE A DAY
Refills: 0 | Status: ACTIVE | COMMUNITY
Start: 2022-01-07

## 2022-07-10 RX ORDER — INSULIN GLARGINE 300 U/ML
INJECTION, SOLUTION SUBCUTANEOUS ONCE A DAY
Refills: 0 | Status: ACTIVE | COMMUNITY
Start: 2022-03-26

## 2022-07-10 RX ORDER — ISOSORBIDE MONONITRATE 30 MG/1
TABLET, EXTENDED RELEASE ORAL ONCE A DAY
Refills: 0 | Status: ACTIVE | COMMUNITY
Start: 2022-02-03

## 2022-08-22 ENCOUNTER — PRE-ADMISSION TESTING (OUTPATIENT)
Dept: ADMISSIONS | Facility: MEDICAL CENTER | Age: 72
End: 2022-08-22
Attending: ORTHOPAEDIC SURGERY
Payer: MEDICARE

## 2022-08-22 DIAGNOSIS — Z01.810 PRE-OPERATIVE CARDIOVASCULAR EXAMINATION: ICD-10-CM

## 2022-08-22 DIAGNOSIS — Z01.812 PRE-OPERATIVE LABORATORY EXAMINATION: ICD-10-CM

## 2022-08-22 LAB
ALBUMIN SERPL BCP-MCNC: 3.8 G/DL (ref 3.2–4.9)
ALBUMIN/GLOB SERPL: 1.6 G/DL
ALP SERPL-CCNC: 72 U/L (ref 30–99)
ALT SERPL-CCNC: 11 U/L (ref 2–50)
ANION GAP SERPL CALC-SCNC: 10 MMOL/L (ref 7–16)
AST SERPL-CCNC: 16 U/L (ref 12–45)
BILIRUB SERPL-MCNC: 0.5 MG/DL (ref 0.1–1.5)
BUN SERPL-MCNC: 17 MG/DL (ref 8–22)
CALCIUM SERPL-MCNC: 9.1 MG/DL (ref 8.4–10.2)
CHLORIDE SERPL-SCNC: 107 MMOL/L (ref 96–112)
CO2 SERPL-SCNC: 25 MMOL/L (ref 20–33)
CREAT SERPL-MCNC: 0.74 MG/DL (ref 0.5–1.4)
EKG IMPRESSION: NORMAL
ERYTHROCYTE [DISTWIDTH] IN BLOOD BY AUTOMATED COUNT: 45 FL (ref 35.9–50)
GFR SERPLBLD CREATININE-BSD FMLA CKD-EPI: 96 ML/MIN/1.73 M 2
GLOBULIN SER CALC-MCNC: 2.4 G/DL (ref 1.9–3.5)
GLUCOSE SERPL-MCNC: 96 MG/DL (ref 65–99)
HCT VFR BLD AUTO: 46.4 % (ref 42–52)
HGB BLD-MCNC: 15.2 G/DL (ref 14–18)
MCH RBC QN AUTO: 31.4 PG (ref 27–33)
MCHC RBC AUTO-ENTMCNC: 32.8 G/DL (ref 33.7–35.3)
MCV RBC AUTO: 95.9 FL (ref 81.4–97.8)
PLATELET # BLD AUTO: 205 K/UL (ref 164–446)
PMV BLD AUTO: 11.9 FL (ref 9–12.9)
POTASSIUM SERPL-SCNC: 4.3 MMOL/L (ref 3.6–5.5)
PROT SERPL-MCNC: 6.2 G/DL (ref 6–8.2)
RBC # BLD AUTO: 4.84 M/UL (ref 4.7–6.1)
SODIUM SERPL-SCNC: 142 MMOL/L (ref 135–145)
WBC # BLD AUTO: 5.3 K/UL (ref 4.8–10.8)

## 2022-08-22 PROCEDURE — 93005 ELECTROCARDIOGRAM TRACING: CPT

## 2022-08-22 PROCEDURE — 87641 MR-STAPH DNA AMP PROBE: CPT

## 2022-08-22 PROCEDURE — 36415 COLL VENOUS BLD VENIPUNCTURE: CPT

## 2022-08-22 PROCEDURE — 80053 COMPREHEN METABOLIC PANEL: CPT

## 2022-08-22 PROCEDURE — 87640 STAPH A DNA AMP PROBE: CPT | Mod: XU

## 2022-08-22 PROCEDURE — 93010 ELECTROCARDIOGRAM REPORT: CPT | Performed by: INTERNAL MEDICINE

## 2022-08-22 PROCEDURE — 85027 COMPLETE CBC AUTOMATED: CPT

## 2022-08-22 RX ORDER — VITAMIN B COMPLEX
5000 TABLET ORAL DAILY
COMMUNITY
End: 2023-02-22

## 2022-08-22 RX ORDER — HYDROCODONE BITARTRATE AND ACETAMINOPHEN 7.5; 325 MG/1; MG/1
1 TABLET ORAL EVERY 6 HOURS PRN
Status: ON HOLD | COMMUNITY
End: 2022-09-07

## 2022-08-22 RX ORDER — ACETAMINOPHEN 500 MG
1000 TABLET ORAL EVERY 6 HOURS PRN
COMMUNITY

## 2022-08-22 RX ORDER — VITAMIN E 268 MG
400 CAPSULE ORAL DAILY
COMMUNITY

## 2022-08-22 RX ORDER — FERROUS SULFATE 325(65) MG
325 TABLET ORAL DAILY
COMMUNITY
End: 2023-02-22

## 2022-08-22 NOTE — PREPROCEDURE INSTRUCTIONS
Per anesthesia protocol instructed to take the following medications DOS: Tylenol, Norco as needed, Zocor.

## 2022-08-23 LAB
SCCMEC + MECA PNL NOSE NAA+PROBE: NEGATIVE
SCCMEC + MECA PNL NOSE NAA+PROBE: POSITIVE

## 2022-08-23 NOTE — DISCHARGE PLANNING
DISCHARGE PLANNING NOTE - TOTAL JOINT    Procedure: Procedure(s):  RIGHT KNEE TOTAL ARTHROPLASTY  Procedure Date: 9/7/2022  Insurance: Payor: [facility] SENIOR CARE PLUS / Plan: [facility] KALEB 21  / Product Type: Medicare Advantage /    Equipment currently available at home?  shower chair and polar ice.  Steps into the home? 2  Steps within the home? 0  Toilet height?  comfort  Type of shower? walk-in shower  Who will be with you during your recovery? spouse  Is Outpatient Physical Therapy set up after surgery? Yes  Did you take the Total Joint Class and where? Yes  Planning same day discharge?Yes     This writer met with pt during his preadmission appt and pt will need a fww. Choice obtained and scanned in to [employer]. Home safety checklist reviewed and copy given to pt. Pt educated to dc criteria. All questions answered and verbalizes understanding of all instructions. No dc needs identified at this time. Anticipate dc to home without barriers.

## 2022-09-06 ENCOUNTER — ANESTHESIA EVENT (OUTPATIENT)
Dept: SURGERY | Facility: MEDICAL CENTER | Age: 72
End: 2022-09-06
Payer: MEDICARE

## 2022-09-07 ENCOUNTER — APPOINTMENT (OUTPATIENT)
Dept: RADIOLOGY | Facility: MEDICAL CENTER | Age: 72
End: 2022-09-07
Attending: ORTHOPAEDIC SURGERY
Payer: MEDICARE

## 2022-09-07 ENCOUNTER — HOSPITAL ENCOUNTER (OUTPATIENT)
Facility: MEDICAL CENTER | Age: 72
End: 2022-09-07
Attending: ORTHOPAEDIC SURGERY | Admitting: ORTHOPAEDIC SURGERY
Payer: MEDICARE

## 2022-09-07 ENCOUNTER — ANESTHESIA (OUTPATIENT)
Dept: SURGERY | Facility: MEDICAL CENTER | Age: 72
End: 2022-09-07
Payer: MEDICARE

## 2022-09-07 VITALS
OXYGEN SATURATION: 95 % | DIASTOLIC BLOOD PRESSURE: 67 MMHG | RESPIRATION RATE: 16 BRPM | HEART RATE: 91 BPM | TEMPERATURE: 97.3 F | SYSTOLIC BLOOD PRESSURE: 121 MMHG | BODY MASS INDEX: 25.38 KG/M2 | WEIGHT: 187.39 LBS | HEIGHT: 72 IN

## 2022-09-07 PROBLEM — M17.10 KNEE ARTHROPATHY: Status: ACTIVE | Noted: 2022-09-07

## 2022-09-07 PROCEDURE — 160048 HCHG OR STATISTICAL LEVEL 1-5: Performed by: ORTHOPAEDIC SURGERY

## 2022-09-07 PROCEDURE — 700105 HCHG RX REV CODE 258: Performed by: ORTHOPAEDIC SURGERY

## 2022-09-07 PROCEDURE — 700101 HCHG RX REV CODE 250: Performed by: ANESTHESIOLOGY

## 2022-09-07 PROCEDURE — 160046 HCHG PACU - 1ST 60 MINS PHASE II: Performed by: ORTHOPAEDIC SURGERY

## 2022-09-07 PROCEDURE — 700101 HCHG RX REV CODE 250: Performed by: ORTHOPAEDIC SURGERY

## 2022-09-07 PROCEDURE — 700111 HCHG RX REV CODE 636 W/ 250 OVERRIDE (IP): Performed by: ORTHOPAEDIC SURGERY

## 2022-09-07 PROCEDURE — 73560 X-RAY EXAM OF KNEE 1 OR 2: CPT | Mod: RT

## 2022-09-07 PROCEDURE — A9270 NON-COVERED ITEM OR SERVICE: HCPCS | Performed by: ANESTHESIOLOGY

## 2022-09-07 PROCEDURE — 160029 HCHG SURGERY MINUTES - 1ST 30 MINS LEVEL 4: Performed by: ORTHOPAEDIC SURGERY

## 2022-09-07 PROCEDURE — C1776 JOINT DEVICE (IMPLANTABLE): HCPCS | Performed by: ORTHOPAEDIC SURGERY

## 2022-09-07 PROCEDURE — 76942 ECHO GUIDE FOR BIOPSY: CPT | Mod: 26 | Performed by: ANESTHESIOLOGY

## 2022-09-07 PROCEDURE — 160025 RECOVERY II MINUTES (STATS): Performed by: ORTHOPAEDIC SURGERY

## 2022-09-07 PROCEDURE — 700102 HCHG RX REV CODE 250 W/ 637 OVERRIDE(OP): Performed by: ANESTHESIOLOGY

## 2022-09-07 PROCEDURE — C1713 ANCHOR/SCREW BN/BN,TIS/BN: HCPCS | Performed by: ORTHOPAEDIC SURGERY

## 2022-09-07 PROCEDURE — 160002 HCHG RECOVERY MINUTES (STAT): Performed by: ORTHOPAEDIC SURGERY

## 2022-09-07 PROCEDURE — 64447 NJX AA&/STRD FEMORAL NRV IMG: CPT | Mod: 59,RT | Performed by: ANESTHESIOLOGY

## 2022-09-07 PROCEDURE — 700111 HCHG RX REV CODE 636 W/ 250 OVERRIDE (IP): Performed by: ANESTHESIOLOGY

## 2022-09-07 PROCEDURE — 502000 HCHG MISC OR IMPLANTS RC 0278: Performed by: ORTHOPAEDIC SURGERY

## 2022-09-07 PROCEDURE — 160035 HCHG PACU - 1ST 60 MINS PHASE I: Performed by: ORTHOPAEDIC SURGERY

## 2022-09-07 PROCEDURE — 99100 ANES PT EXTEME AGE<1 YR&>70: CPT | Performed by: ANESTHESIOLOGY

## 2022-09-07 PROCEDURE — 160047 HCHG PACU  - EA ADDL 30 MINS PHASE II: Performed by: ORTHOPAEDIC SURGERY

## 2022-09-07 PROCEDURE — 01402 ANES OPN/ARTH TOT KNE ARTHRP: CPT | Performed by: ANESTHESIOLOGY

## 2022-09-07 PROCEDURE — 160041 HCHG SURGERY MINUTES - EA ADDL 1 MIN LEVEL 4: Performed by: ORTHOPAEDIC SURGERY

## 2022-09-07 PROCEDURE — 64447 NJX AA&/STRD FEMORAL NRV IMG: CPT | Performed by: ORTHOPAEDIC SURGERY

## 2022-09-07 PROCEDURE — 160009 HCHG ANES TIME/MIN: Performed by: ORTHOPAEDIC SURGERY

## 2022-09-07 PROCEDURE — 160036 HCHG PACU - EA ADDL 30 MINS PHASE I: Performed by: ORTHOPAEDIC SURGERY

## 2022-09-07 PROCEDURE — 97162 PT EVAL MOD COMPLEX 30 MIN: CPT

## 2022-09-07 DEVICE — CEMENT ORTHOPEDIC HV US  (10/PK): Type: IMPLANTABLE DEVICE | Site: KNEE | Status: FUNCTIONAL

## 2022-09-07 DEVICE — IMPLANTABLE DEVICE: Type: IMPLANTABLE DEVICE | Site: KNEE | Status: FUNCTIONAL

## 2022-09-07 RX ORDER — SODIUM CHLORIDE, SODIUM LACTATE, POTASSIUM CHLORIDE, CALCIUM CHLORIDE 600; 310; 30; 20 MG/100ML; MG/100ML; MG/100ML; MG/100ML
INJECTION, SOLUTION INTRAVENOUS CONTINUOUS
Status: DISCONTINUED | OUTPATIENT
Start: 2022-09-07 | End: 2022-09-07 | Stop reason: HOSPADM

## 2022-09-07 RX ORDER — POLYETHYLENE GLYCOL 3350 17 G/17G
1 POWDER, FOR SOLUTION ORAL 2 TIMES DAILY PRN
Status: CANCELLED | OUTPATIENT
Start: 2022-09-07

## 2022-09-07 RX ORDER — HYDROMORPHONE HYDROCHLORIDE 2 MG/ML
0.5 INJECTION, SOLUTION INTRAMUSCULAR; INTRAVENOUS; SUBCUTANEOUS
Status: CANCELLED | OUTPATIENT
Start: 2022-09-07

## 2022-09-07 RX ORDER — HYDROMORPHONE HYDROCHLORIDE 1 MG/ML
0.1 INJECTION, SOLUTION INTRAMUSCULAR; INTRAVENOUS; SUBCUTANEOUS
Status: DISCONTINUED | OUTPATIENT
Start: 2022-09-07 | End: 2022-09-07 | Stop reason: HOSPADM

## 2022-09-07 RX ORDER — HYDROMORPHONE HYDROCHLORIDE 2 MG/ML
INJECTION, SOLUTION INTRAMUSCULAR; INTRAVENOUS; SUBCUTANEOUS PRN
Status: DISCONTINUED | OUTPATIENT
Start: 2022-09-07 | End: 2022-09-07 | Stop reason: SURG

## 2022-09-07 RX ORDER — SODIUM CHLORIDE, SODIUM LACTATE, POTASSIUM CHLORIDE, CALCIUM CHLORIDE 600; 310; 30; 20 MG/100ML; MG/100ML; MG/100ML; MG/100ML
INJECTION, SOLUTION INTRAVENOUS CONTINUOUS
Status: ACTIVE | OUTPATIENT
Start: 2022-09-07 | End: 2022-09-07

## 2022-09-07 RX ORDER — CELECOXIB 200 MG/1
200 CAPSULE ORAL ONCE
Status: COMPLETED | OUTPATIENT
Start: 2022-09-07 | End: 2022-09-07

## 2022-09-07 RX ORDER — CELECOXIB 200 MG/1
200 CAPSULE ORAL 2 TIMES DAILY PRN
Status: CANCELLED | OUTPATIENT
Start: 2022-09-12

## 2022-09-07 RX ORDER — SODIUM CHLORIDE 9 MG/ML
INJECTION, SOLUTION INTRAMUSCULAR; INTRAVENOUS; SUBCUTANEOUS
Status: DISCONTINUED | OUTPATIENT
Start: 2022-09-07 | End: 2022-09-07 | Stop reason: HOSPADM

## 2022-09-07 RX ORDER — ACETAMINOPHEN 500 MG
1000 TABLET ORAL ONCE
Status: COMPLETED | OUTPATIENT
Start: 2022-09-07 | End: 2022-09-07

## 2022-09-07 RX ORDER — DOCUSATE SODIUM 100 MG/1
100 CAPSULE, LIQUID FILLED ORAL 2 TIMES DAILY
Status: CANCELLED | OUTPATIENT
Start: 2022-09-07

## 2022-09-07 RX ORDER — LIDOCAINE HYDROCHLORIDE 20 MG/ML
INJECTION, SOLUTION EPIDURAL; INFILTRATION; INTRACAUDAL; PERINEURAL PRN
Status: DISCONTINUED | OUTPATIENT
Start: 2022-09-07 | End: 2022-09-07 | Stop reason: SURG

## 2022-09-07 RX ORDER — MORPHINE SULFATE 0.5 MG/ML
INJECTION, SOLUTION EPIDURAL; INTRATHECAL; INTRAVENOUS
Status: DISCONTINUED | OUTPATIENT
Start: 2022-09-07 | End: 2022-09-07 | Stop reason: HOSPADM

## 2022-09-07 RX ORDER — HALOPERIDOL 5 MG/ML
1 INJECTION INTRAMUSCULAR EVERY 6 HOURS PRN
Status: CANCELLED | OUTPATIENT
Start: 2022-09-07

## 2022-09-07 RX ORDER — AMOXICILLIN 250 MG
1 CAPSULE ORAL NIGHTLY
Status: CANCELLED | OUTPATIENT
Start: 2022-09-07

## 2022-09-07 RX ORDER — MAGNESIUM SULFATE HEPTAHYDRATE 40 MG/ML
INJECTION, SOLUTION INTRAVENOUS PRN
Status: DISCONTINUED | OUTPATIENT
Start: 2022-09-07 | End: 2022-09-07 | Stop reason: SURG

## 2022-09-07 RX ORDER — ACETAMINOPHEN 500 MG
1000 TABLET ORAL EVERY 6 HOURS
Status: CANCELLED | OUTPATIENT
Start: 2022-09-07 | End: 2022-09-12

## 2022-09-07 RX ORDER — SCOLOPAMINE TRANSDERMAL SYSTEM 1 MG/1
1 PATCH, EXTENDED RELEASE TRANSDERMAL
Status: CANCELLED | OUTPATIENT
Start: 2022-09-07

## 2022-09-07 RX ORDER — ONDANSETRON 2 MG/ML
4 INJECTION INTRAMUSCULAR; INTRAVENOUS EVERY 4 HOURS PRN
Status: CANCELLED | OUTPATIENT
Start: 2022-09-07

## 2022-09-07 RX ORDER — ROPIVACAINE HYDROCHLORIDE 5 MG/ML
INJECTION, SOLUTION EPIDURAL; INFILTRATION; PERINEURAL
Status: DISCONTINUED | OUTPATIENT
Start: 2022-09-07 | End: 2022-09-07 | Stop reason: HOSPADM

## 2022-09-07 RX ORDER — HYDRALAZINE HYDROCHLORIDE 20 MG/ML
INJECTION INTRAMUSCULAR; INTRAVENOUS PRN
Status: DISCONTINUED | OUTPATIENT
Start: 2022-09-07 | End: 2022-09-07 | Stop reason: SURG

## 2022-09-07 RX ORDER — AMOXICILLIN 250 MG
1 CAPSULE ORAL
Status: CANCELLED | OUTPATIENT
Start: 2022-09-07

## 2022-09-07 RX ORDER — KETAMINE HCL 50MG/ML(1)
SYRINGE (ML) INTRAVENOUS PRN
Status: DISCONTINUED | OUTPATIENT
Start: 2022-09-07 | End: 2022-09-07 | Stop reason: SURG

## 2022-09-07 RX ORDER — TRAMADOL HYDROCHLORIDE 50 MG/1
50 TABLET ORAL EVERY 6 HOURS
Status: CANCELLED | OUTPATIENT
Start: 2022-09-07

## 2022-09-07 RX ORDER — ENEMA 19; 7 G/133ML; G/133ML
1 ENEMA RECTAL
Status: CANCELLED | OUTPATIENT
Start: 2022-09-07

## 2022-09-07 RX ORDER — GABAPENTIN 300 MG/1
300 CAPSULE ORAL ONCE
Status: COMPLETED | OUTPATIENT
Start: 2022-09-07 | End: 2022-09-07

## 2022-09-07 RX ORDER — LABETALOL HYDROCHLORIDE 5 MG/ML
5 INJECTION, SOLUTION INTRAVENOUS
Status: DISCONTINUED | OUTPATIENT
Start: 2022-09-07 | End: 2022-09-07 | Stop reason: HOSPADM

## 2022-09-07 RX ORDER — KETOROLAC TROMETHAMINE 30 MG/ML
INJECTION, SOLUTION INTRAMUSCULAR; INTRAVENOUS
Status: DISCONTINUED | OUTPATIENT
Start: 2022-09-07 | End: 2022-09-07 | Stop reason: HOSPADM

## 2022-09-07 RX ORDER — ONDANSETRON 2 MG/ML
4 INJECTION INTRAMUSCULAR; INTRAVENOUS
Status: DISCONTINUED | OUTPATIENT
Start: 2022-09-07 | End: 2022-09-07 | Stop reason: HOSPADM

## 2022-09-07 RX ORDER — OXYCODONE HYDROCHLORIDE 5 MG/1
5 TABLET ORAL
Status: CANCELLED | OUTPATIENT
Start: 2022-09-07

## 2022-09-07 RX ORDER — TRANEXAMIC ACID 100 MG/ML
INJECTION, SOLUTION INTRAVENOUS PRN
Status: DISCONTINUED | OUTPATIENT
Start: 2022-09-07 | End: 2022-09-07 | Stop reason: SURG

## 2022-09-07 RX ORDER — DEXAMETHASONE SODIUM PHOSPHATE 4 MG/ML
4 INJECTION, SOLUTION INTRA-ARTICULAR; INTRALESIONAL; INTRAMUSCULAR; INTRAVENOUS; SOFT TISSUE
Status: CANCELLED | OUTPATIENT
Start: 2022-09-07

## 2022-09-07 RX ORDER — HYDROMORPHONE HYDROCHLORIDE 1 MG/ML
0.4 INJECTION, SOLUTION INTRAMUSCULAR; INTRAVENOUS; SUBCUTANEOUS
Status: DISCONTINUED | OUTPATIENT
Start: 2022-09-07 | End: 2022-09-07 | Stop reason: HOSPADM

## 2022-09-07 RX ORDER — BISACODYL 10 MG
10 SUPPOSITORY, RECTAL RECTAL
Status: CANCELLED | OUTPATIENT
Start: 2022-09-07

## 2022-09-07 RX ORDER — ACETAMINOPHEN 500 MG
1000 TABLET ORAL EVERY 6 HOURS PRN
Status: CANCELLED | OUTPATIENT
Start: 2022-09-12

## 2022-09-07 RX ORDER — CELECOXIB 200 MG/1
200 CAPSULE ORAL 2 TIMES DAILY
Status: CANCELLED | OUTPATIENT
Start: 2022-09-07 | End: 2022-09-12

## 2022-09-07 RX ORDER — HYDRALAZINE HYDROCHLORIDE 20 MG/ML
5 INJECTION INTRAMUSCULAR; INTRAVENOUS
Status: DISCONTINUED | OUTPATIENT
Start: 2022-09-07 | End: 2022-09-07 | Stop reason: HOSPADM

## 2022-09-07 RX ORDER — EPINEPHRINE 1 MG/ML(1)
AMPUL (ML) INJECTION
Status: DISCONTINUED | OUTPATIENT
Start: 2022-09-07 | End: 2022-09-07 | Stop reason: HOSPADM

## 2022-09-07 RX ORDER — DIPHENHYDRAMINE HYDROCHLORIDE 50 MG/ML
25 INJECTION INTRAMUSCULAR; INTRAVENOUS EVERY 6 HOURS PRN
Status: CANCELLED | OUTPATIENT
Start: 2022-09-07

## 2022-09-07 RX ORDER — MIDAZOLAM HYDROCHLORIDE 1 MG/ML
INJECTION INTRAMUSCULAR; INTRAVENOUS PRN
Status: DISCONTINUED | OUTPATIENT
Start: 2022-09-07 | End: 2022-09-07 | Stop reason: SURG

## 2022-09-07 RX ORDER — ALBUTEROL SULFATE 2.5 MG/3ML
2.5 SOLUTION RESPIRATORY (INHALATION)
Status: DISCONTINUED | OUTPATIENT
Start: 2022-09-07 | End: 2022-09-07 | Stop reason: HOSPADM

## 2022-09-07 RX ORDER — BUPIVACAINE HYDROCHLORIDE AND EPINEPHRINE 2.5; 5 MG/ML; UG/ML
INJECTION, SOLUTION EPIDURAL; INFILTRATION; INTRACAUDAL; PERINEURAL PRN
Status: DISCONTINUED | OUTPATIENT
Start: 2022-09-07 | End: 2022-09-07 | Stop reason: SURG

## 2022-09-07 RX ORDER — DIPHENHYDRAMINE HYDROCHLORIDE 50 MG/ML
12.5 INJECTION INTRAMUSCULAR; INTRAVENOUS
Status: DISCONTINUED | OUTPATIENT
Start: 2022-09-07 | End: 2022-09-07 | Stop reason: HOSPADM

## 2022-09-07 RX ORDER — HYDROMORPHONE HYDROCHLORIDE 1 MG/ML
0.2 INJECTION, SOLUTION INTRAMUSCULAR; INTRAVENOUS; SUBCUTANEOUS
Status: DISCONTINUED | OUTPATIENT
Start: 2022-09-07 | End: 2022-09-07 | Stop reason: HOSPADM

## 2022-09-07 RX ORDER — CEFAZOLIN SODIUM 1 G/3ML
INJECTION, POWDER, FOR SOLUTION INTRAMUSCULAR; INTRAVENOUS PRN
Status: DISCONTINUED | OUTPATIENT
Start: 2022-09-07 | End: 2022-09-07 | Stop reason: SURG

## 2022-09-07 RX ORDER — DEXAMETHASONE SODIUM PHOSPHATE 4 MG/ML
INJECTION, SOLUTION INTRA-ARTICULAR; INTRALESIONAL; INTRAMUSCULAR; INTRAVENOUS; SOFT TISSUE PRN
Status: DISCONTINUED | OUTPATIENT
Start: 2022-09-07 | End: 2022-09-07 | Stop reason: SURG

## 2022-09-07 RX ORDER — OXYCODONE HYDROCHLORIDE 10 MG/1
10 TABLET ORAL
Status: CANCELLED | OUTPATIENT
Start: 2022-09-07

## 2022-09-07 RX ORDER — SODIUM CHLORIDE 9 MG/ML
INJECTION, SOLUTION INTRAVENOUS CONTINUOUS
Status: CANCELLED | OUTPATIENT
Start: 2022-09-07 | End: 2022-09-07

## 2022-09-07 RX ORDER — ONDANSETRON 2 MG/ML
INJECTION INTRAMUSCULAR; INTRAVENOUS PRN
Status: DISCONTINUED | OUTPATIENT
Start: 2022-09-07 | End: 2022-09-07 | Stop reason: SURG

## 2022-09-07 RX ADMIN — SODIUM CHLORIDE, POTASSIUM CHLORIDE, SODIUM LACTATE AND CALCIUM CHLORIDE: 600; 310; 30; 20 INJECTION, SOLUTION INTRAVENOUS at 06:45

## 2022-09-07 RX ADMIN — HYDROMORPHONE HYDROCHLORIDE 1 MG: 2 INJECTION INTRAMUSCULAR; INTRAVENOUS; SUBCUTANEOUS at 07:31

## 2022-09-07 RX ADMIN — CELECOXIB 200 MG: 200 CAPSULE ORAL at 06:43

## 2022-09-07 RX ADMIN — MAGNESIUM SULFATE HEPTAHYDRATE 2 G: 40 INJECTION, SOLUTION INTRAVENOUS at 07:32

## 2022-09-07 RX ADMIN — MIDAZOLAM HYDROCHLORIDE 2 MG: 1 INJECTION, SOLUTION INTRAMUSCULAR; INTRAVENOUS at 06:45

## 2022-09-07 RX ADMIN — PROPOFOL 150 MG: 10 INJECTION, EMULSION INTRAVENOUS at 07:17

## 2022-09-07 RX ADMIN — DEXAMETHASONE SODIUM PHOSPHATE 8 MG: 4 INJECTION, SOLUTION INTRA-ARTICULAR; INTRALESIONAL; INTRAMUSCULAR; INTRAVENOUS; SOFT TISSUE at 07:21

## 2022-09-07 RX ADMIN — Medication 50 MG: at 07:29

## 2022-09-07 RX ADMIN — ONDANSETRON 4 MG: 2 INJECTION INTRAMUSCULAR; INTRAVENOUS at 08:22

## 2022-09-07 RX ADMIN — GABAPENTIN 300 MG: 300 CAPSULE ORAL at 06:43

## 2022-09-07 RX ADMIN — ROCURONIUM BROMIDE 50 MG: 10 INJECTION, SOLUTION INTRAVENOUS at 07:17

## 2022-09-07 RX ADMIN — TRANEXAMIC ACID 1000 MG: 100 INJECTION, SOLUTION INTRAVENOUS at 07:21

## 2022-09-07 RX ADMIN — SUGAMMADEX 200 MG: 100 INJECTION, SOLUTION INTRAVENOUS at 08:35

## 2022-09-07 RX ADMIN — CEFAZOLIN 2 G: 330 INJECTION, POWDER, FOR SOLUTION INTRAMUSCULAR; INTRAVENOUS at 07:21

## 2022-09-07 RX ADMIN — TRANEXAMIC ACID 1000 MG: 100 INJECTION, SOLUTION INTRAVENOUS at 08:18

## 2022-09-07 RX ADMIN — BUPIVACAINE HYDROCHLORIDE AND EPINEPHRINE 20 ML: 2.5; 5 INJECTION, SOLUTION EPIDURAL; INFILTRATION; INTRACAUDAL; PERINEURAL at 06:58

## 2022-09-07 RX ADMIN — ACETAMINOPHEN 1000 MG: 500 TABLET, FILM COATED ORAL at 06:42

## 2022-09-07 RX ADMIN — HYDRALAZINE HYDROCHLORIDE 5 MG: 20 INJECTION INTRAMUSCULAR; INTRAVENOUS at 07:40

## 2022-09-07 RX ADMIN — HYDROMORPHONE HYDROCHLORIDE 1 MG: 2 INJECTION INTRAMUSCULAR; INTRAVENOUS; SUBCUTANEOUS at 07:42

## 2022-09-07 RX ADMIN — SODIUM CHLORIDE, POTASSIUM CHLORIDE, SODIUM LACTATE AND CALCIUM CHLORIDE: 600; 310; 30; 20 INJECTION, SOLUTION INTRAVENOUS at 06:36

## 2022-09-07 RX ADMIN — GLYCOPYRROLATE 0.1 MG: 0.2 INJECTION INTRAMUSCULAR; INTRAVENOUS at 07:38

## 2022-09-07 RX ADMIN — LIDOCAINE HYDROCHLORIDE 50 MG: 20 INJECTION, SOLUTION EPIDURAL; INFILTRATION; INTRACAUDAL at 06:58

## 2022-09-07 RX ADMIN — FENTANYL CITRATE 100 MCG: 50 INJECTION, SOLUTION INTRAMUSCULAR; INTRAVENOUS at 07:16

## 2022-09-07 ASSESSMENT — GAIT ASSESSMENTS
ASSISTIVE DEVICE: FRONT WHEEL WALKER
GAIT LEVEL OF ASSIST: SUPERVISED
DEVIATION: STEP TO
DISTANCE (FEET): 100

## 2022-09-07 ASSESSMENT — COGNITIVE AND FUNCTIONAL STATUS - GENERAL
SUGGESTED CMS G CODE MODIFIER MOBILITY: CH
MOBILITY SCORE: 24

## 2022-09-07 ASSESSMENT — PAIN DESCRIPTION - PAIN TYPE: TYPE: SURGICAL PAIN

## 2022-09-07 ASSESSMENT — FIBROSIS 4 INDEX: FIB4 SCORE: 1.72

## 2022-09-07 NOTE — OR NURSING
0840: To PACU post right total knee arthroplasty w/ block. Pt is extubated, breathing is spontaneous and unlabored. Strong DP pulse observed on operative extremity. Dressings are c/d/I.    0848: Pt slightly more awake. Currently denies pain.    0903: Pain is tolerable at 3/10.    0915: Pt given I.S. w/ goal of 3,150 ml. Pt is able to inhale 2,750 w/ good technique.    0929: Pt unable to push knee downward or lift leg from bed.     0933: Pain is 5/10. Pt declines pain medication at this time.    1018: Pt laying w/ eyes closed. Pain remains tolerable. Pt is now able to lift leg from bed and is able to push knee downward. Meets criteria for stage ll.

## 2022-09-07 NOTE — THERAPY
Physical Therapy   Initial Evaluation     Patient Name: Raymundo Jackson  Age:  72 y.o., Sex:  male  Medical Record #: [id number]  Today's Date: 9/7/2022     Precautions  Precautions: (P) Weight Bearing As Tolerated Right Lower Extremity    Assessment  Patient is 72 y.o. male with a diagnosis of R TKR PT lives at home with wife and is active.Pt is safe with bed mob,transfers,ambulation and stairs.He understands HEP and has no equipment needs     Plan    Recommend Physical Therapy for Evaluation only    DC Equipment Recommendations: (P) None  Discharge Recommendations: (P) Recommend outpatient physical therapy services to address higher level deficits       Objective       09/07/22 1400   Charge Group   PT Evaluation PT Evaluation Mod   Total Time Spent   PT Total Time Yes   PT Evaluation Time Spent (Mins) 40   Initial Contact Note    Initial Contact Note Order Received and Verified, Physical Therapy Evaluation in Progress with Full Report to Follow.   Precautions   Precautions Weight Bearing As Tolerated Right Lower Extremity   Prior Living Situation   Prior Services None   Housing / Facility 1 Story House   Steps Into Home 2   Steps In Home 0   Equipment Owned Front-Wheel Walker   Lives with - Patient's Self Care Capacity Spouse   Prior Level of Functional Mobility   Bed Mobility Independent   Transfer Status Independent   Ambulation Independent   Distance Ambulation (Feet)   (community amb)   Assistive Devices Used None   Passive ROM Lower Body   Passive ROM Lower Body X   Rt Knee Flexion Degrees 90   Rt Knee Extension Degrees 0   Active ROM Lower Body    Active ROM Lower Body  X   Strength Lower Body   Lower Body Strength  X   Coordination Lower Body    Coordination Lower Body  WDL   Balance Assessment   Sitting Balance (Static) Good   Sitting Balance (Dynamic) Good   Standing Balance (Static) Good   Standing Balance (Dynamic) Fair +   Weight Shift Sitting Good   Weight Shift Standing Good   Gait Analysis   Gait  Level Of Assist Supervised   Assistive Device Front Wheel Walker   Distance (Feet) 100   # of Times Distance was Traveled 1   Deviation Step To   # of Stairs Climbed 3   Level of Assist with Stairs Standby Assist   Weight Bearing Status wbat R   Functional Mobility   Sit to Stand Supervised   Bed, Chair, Wheelchair Transfer Supervised   Transfer Method Stand Step   How much difficulty does the patient currently have...   Turning over in bed (including adjusting bedclothes, sheets and blankets)? 4   Sitting down on and standing up from a chair with arms (e.g., wheelchair, bedside commode, etc.) 4   Moving from lying on back to sitting on the side of the bed? 4   How much help from another person does the patient currently need...   Moving to and from a bed to a chair (including a wheelchair)? 4   Need to walk in a hospital room? 4   Climbing 3-5 steps with a railing? 4   6 clicks Mobility Score 24   Activity Tolerance   Sitting Edge of Bed 10   Standing 10   Patient / Family Goals    Patient / Family Goal #1 Home   Anticipated Discharge Equipment and Recommendations   DC Equipment Recommendations None   Discharge Recommendations Recommend outpatient physical therapy services to address higher level deficits   Interdisciplinary Plan of Care Collaboration   IDT Collaboration with  Nursing   Session Information   Date / Session Number  9/7   Priority 0

## 2022-09-07 NOTE — ANESTHESIA PREPROCEDURE EVALUATION
" Case: [id number] Date/Time: 09/07/22 0645    Procedure: RIGHT KNEE TOTAL ARTHROPLASTY (Right)    Pre-op diagnosis: OSTEOARTHRITIS OF KNEE    Location:  OR 03 / SURGERY [facility]    Surgeons: JAIRO Sykes H&P:  PAST MEDICAL HISTORY:   72 y.o. male who presents for Procedure(s) (LRB):  RIGHT KNEE TOTAL ARTHROPLASTY (Right).  He has current and past medical problems significant for:    Patient denies history of seizures or strokes  Patient denies history of diabetes or thyroid disease  Patient denies history of GERD or esophageal disease  Patient denies history of RENAY  Patient denies history of previous MI, chest pain or shortness of breath  Patient denies history of renal failure  Patient denies history of upper or lower extremity motor/sensory deficits   Patient denies history of bleeding disorders  Patient denies history of complications with anesthesia    Able to climb 2 flights of stair without dyspnea or angina, > 4 METs     Past Medical History:   Diagnosis Date   • Awareness under anesthesia     \"woke up too soon\"   • Diabetes (HCC)    • Hemorrhagic disorder (HCC)     frequent nose bleeds left nare   • Hepatitis C    • High cholesterol    • Pain     bilateral thumbs       SMOKING/ALCOHOL/RECREATIONAL DRUG USE:  Social History     Tobacco Use   • Smoking status: Never   • Smokeless tobacco: Never   Vaping Use   • Vaping Use: Never used   Substance Use Topics   • Alcohol use: Never   • Drug use: Never     Social History     Substance and Sexual Activity   Drug Use Never       PAST SURGICAL HISTORY:  History reviewed. No pertinent surgical history.    ALLERGIES:   No Known Allergies    MEDICATIONS:  No current facility-administered medications on file prior to encounter.     Current Outpatient Medications on File Prior to Encounter   Medication Sig Dispense Refill   • metFORMIN (GLUCOPHAGE) 500 MG Tab Take 500 mg by mouth every day.     • simvastatin (ZOCOR) 10 MG Tab Take 10 mg by " mouth every evening.         LABS:  Lab Results   Component Value Date/Time    HEMOGLOBIN 15.2 08/22/2022 1500    HEMATOCRIT 46.4 08/22/2022 1500    WBC 5.3 08/22/2022 1500     Lab Results   Component Value Date/Time    SODIUM 142 08/22/2022 1500    POTASSIUM 4.3 08/22/2022 1500    CHLORIDE 107 08/22/2022 1500    CO2 25 08/22/2022 1500    GLUCOSE 96 08/22/2022 1500    BUN 17 08/22/2022 1500    CALCIUM 9.1 08/22/2022 1500       SARS-CoV2 result: Negative      PREVIOUS ANESTHETICS: See EMR  __________________________________________      Relevant Problems   No relevant active problems       Physical Exam    Airway   Mallampati: II  TM distance: >3 FB  Neck ROM: full       Cardiovascular - normal exam  Rhythm: regular  Rate: normal  (-) murmur     Dental - normal exam        Facial Hair   Pulmonary - normal exam  Breath sounds clear to auscultation     Abdominal    Neurological - normal exam                 Anesthesia Plan    ASA 2       Plan - general and peripheral nerve block     Peripheral nerve block will be post-op pain control  Airway plan will be ETT          Induction: intravenous    Postoperative Plan: Postoperative administration of opioids is intended.    Pertinent diagnostic labs and testing reviewed    Informed Consent:    Anesthetic plan and risks discussed with patient.    Use of blood products discussed with: patient whom consented to blood products.

## 2022-09-07 NOTE — OR NURSING
0930 assumed pt care.    0940 pain rating at 6/10; pt states it is at a tolerable level, declines the need for pharmacological intervention, cold pack on right knee.     0956 pt care handed off to TONY Grace.

## 2022-09-07 NOTE — ANESTHESIA PROCEDURE NOTES
Peripheral Block    Date/Time: 9/7/2022 6:58 AM  Performed by: Ben Beck M.D.  Authorized by: Ben Beck M.D.     Patient Location:  Pre-op  Start Time:  9/7/2022 6:58 AM  Reason for Block: at surgeon's request and post-op pain management ONLY    patient identified, IV checked, site marked, risks and benefits discussed, surgical consent, monitors and equipment checked, pre-op evaluation and timeout performed    Patient Position:  Supine  Prep: ChloraPrep    Monitoring:  Heart rate, continuous pulse ox and cardiac monitor  Block Region:  Lower Extremity  Lower Extremity - Block Type:  Selective FEMORAL nerve block at the Adductor Canal    Laterality:  Right  Procedures: ultrasound guided  Image captured, interpreted and electronically stored.  Local Infiltration:  Lidocaine  Strength:  1 %  Dose:  3 ml  Block Type:  Single-shot  Needle Length:  100mm  Needle Gauge:  21 G  Needle Localization:  Ultrasound guidance  Injection Assessment:  Negative aspiration for heme, no paresthesia on injection, incremental injection and local visualized surrounding nerve on ultrasound  Evidence of intravascular injection: No     US Guided Selective Femoral Nerve Block at Adductor Canal:   US probe placed at mid-thigh level on externally rotated leg and femur identified.  Probe directed medially until Sartorius Muscle (SM), Femoral Artery (FA) and Saphenous Nerve (SN) identified in Adductor Canal (AC).  Needle inserted anterolateral to probe in an in plane approach into a subsartorial perivascular perineural position.  After negative aspiration LA injected with ease and visualized spreading within the AC.

## 2022-09-07 NOTE — ANESTHESIA TIME REPORT
Anesthesia Start and Stop Event Times     Date Time Event    9/7/2022 0645 Anesthesia Start     0655 Ready for Procedure     0850 Anesthesia Stop        Responsible Staff  09/07/22    Name Role Begin End    Ben Beck M.D. Anesth 0645 0850        Overtime Reason:  overtime    Comments:

## 2022-09-07 NOTE — OP REPORT
DATE OF SERVICE:  09/07/2022     PREOPERATIVE DIAGNOSIS:  Right knee degenerative joint disease.     POSTOPERATIVE DIAGNOSIS:  Right knee degenerative joint disease.     PROCEDURE:  Right total knee arthroplasty.     SURGEON:  Brodie Del Valle MD     ASSISTANT:  None.     ANESTHESIA:  General endotracheal anesthesia with adductor canal block.     ANESTHESIOLOGIST:  Ben Beck MD     ESTIMATED BLOOD LOSS:  Minimal.     COMPLICATIONS:  None.     INDICATIONS FOR PROCEDURE:  This is a 72-year-old male with severe   degenerative joint disease of his right knee.  He has failed conservative   management including home exercise program, anti-inflammatories,   intra-articular injections.  He presents for operative treatment.  For further   details of H and P, please see chart.     Risks, benefits and alternatives of procedure were discussed with the patient   and include, but are not limited to bleeding, infection, neurovascular injury,   need for further surgery, PE, DVT, blood transfusion with its associated   risks, anesthesia with its associated risks, and death.  All questions were   answered.  No warranties or guarantees were given or implied.  Consent is   signed and is on chart.     DESCRIPTION OF PROCEDURE:  The patient was taken to the operating room and   placed supine on the operating room table.  General endotracheal anesthesia   was induced.  Prior to this, an adductor canal block was placed in   preoperative holding for postoperative pain control.  The patient's right   lower extremity was prepped and draped in normal sterile fashion.  Leg was   exsanguinated and tourniquet inflated to 250 mmHg.  A 25 cm incision was made   over the anterior aspect of the knee.  This was taken sharply through the skin   and subcutaneous tissue.  Bovie cautery was used to obtain hemostasis.    Dissection was carried down to the level of the patellar retinaculum.  Flaps   were elevated in medial and lateral  aspects.  Medial parapatellar arthrotomy   was performed.  Retropatellar fat pad was excised.  The patella was everted   and knee flexed to 90 degrees.  Retractors were placed.  Cartilage was removed   off the distal femur.  Distal femoral resection guide was placed and pinned   into position.  Distal resection was performed.  Anterior resection guide was   pinned and the anterior condyles, posterior condyles, and anterior chamfer   were cut.  These were then removed.  Posterior condylar guide was placed and   the posterior chamfers were cut.  Notch guide was placed and the notch was   cut.  This was checked with resection guide and felt to be adequate.    Attention was then placed on the tibia.  All soft tissue was removed.    Cartilage was removed off the proximal tibia.  Tibial resection guide was   placed and tibial resection was performed.  All bone was removed.  Posterior   release was performed.  Guides were placed and felt to be slightly tight;   therefore a +2 resection was performed on the tibia.  All bone was removed.    Trials were placed again showed excellent motion with good stability.  At this   point, a 9.5 mm resection was performed on the patella.  This was sized to a   size 41, which was drilled.  Trial fit quite nicely.  All trials were removed.    Posterior capsule was injected with a combination of Marcaine, Toradol and   Duramorph.  The knee was thoroughly lavaged.  The ConforMIS patient specific   knee was cemented into position with a 6 mm poly and a 41 mm medial dome   patella.  All excess cement was removed.  The knee was thoroughly lavaged with   Betadine.  Then, re-lavaged with saline.  Tourniquet was dropped.  Adequate   hemostasis was achieved.  Parapatellar arthrotomy was closed using #2 Quill.    Subcutaneous tissue was closed with 2-0 Vicryl.  Skin closed using staples.    The patient was placed in a soft sterile dressing.  He was awakened from   anesthesia and returned to recovery  cart in the recovery room in stable   condition.  There were no anam or intraoperative complications noted.        ______________________________  MD GABBIE Yu/LEOBARDO    DD:  09/07/2022 08:44  DT:  09/07/2022 10:38    Job#:  [id number]

## 2022-09-07 NOTE — OR NURSING
1101 report from Ryder JIMENEZ assumed care of patient. PT working with Patient     1135 PT brought patient back from [facility] PT states Patient meets criteria for discharge  he is able to pull 3100 from IS    1140 DC'd to care of family post uneventful stay in PACU 2.

## 2022-09-07 NOTE — OR SURGEON
Immediate Post OP Note    PreOp Diagnosis: rt knee DJD      PostOp Diagnosis: same      Procedure(s):  RIGHT KNEE TOTAL ARTHROPLASTY - Wound Class: Clean    Surgeon(s):  Brodie Del Valle M.D.    Anesthesiologist/Type of Anesthesia:  Anesthesiologist: Ben Beck M.D./General    Surgical Staff:  Circulator: Dory Wells R.N.; Heather C Cogan, R.N.  Limb Danielson: Tadeo Prince  Scrub Person: Judah Prince; Tadeo Hardy    Specimens removed if any:  * No specimens in log *    Estimated Blood Loss: minimal    Findings: severe DJD rt knee    Complications: none        9/7/2022 8:40 AM Brodie Del Valle M.D.

## 2022-09-07 NOTE — ANESTHESIA POSTPROCEDURE EVALUATION
Patient: Raymundo Jackson    Procedure Summary     Date: 09/07/22 Room / Location:  OR  / SURGERY [facility]    Anesthesia Start: 0645 Anesthesia Stop: 0850    Procedure: RIGHT KNEE TOTAL ARTHROPLASTY (Right: Knee) Diagnosis: (OSTEOARTHRITIS OF KNEE)    Surgeons: Brodie Del Valle M.D. Responsible Provider: Ben Beck M.D.    Anesthesia Type: general, peripheral nerve block ASA Status: 2          Final Anesthesia Type: general, peripheral nerve block  Last vitals  BP   Blood Pressure : 128/60    Temp   36.3 °C (97.3 °F)    Pulse   68   Resp   14    SpO2   95 %      Anesthesia Post Evaluation    Patient location during evaluation: PACU  Patient participation: complete - patient participated  Level of consciousness: awake and alert    Airway patency: patent  Anesthetic complications: no  Cardiovascular status: hemodynamically stable  Respiratory status: acceptable  Hydration status: euvolemic    PONV: none          No notable events documented.     Nurse Pain Score: 4 (NPRS)

## 2022-09-07 NOTE — DISCHARGE INSTRUCTIONS
HOME CARE INSTRUCTIONS    ACTIVITY: Rest and take it easy for the first 24 hours.  A responsible adult is recommended to remain with you during that time.  It is normal to feel sleepy.  We encourage you to not do anything that requires balance, judgment or coordination.    FOR 24 HOURS DO NOT:  Drive, operate machinery or run household appliances.  Drink beer or alcoholic beverages.  Make important decisions or sign legal documents.    SPECIAL INSTRUCTIONS: Activity and weight bearing as tolerated. Ice and elevate.    DIET: To avoid nausea, slowly advance diet as tolerated, avoiding spicy or greasy foods for the first day.  Add more substantial food to your diet according to your physician's instructions.    INCREASE FLUIDS AND FIBER TO AVOID CONSTIPATION.    SURGICAL DRESSING/BATHING: Do not remove dressing. May shower in 2 days (Friday) with dressing in place.    MEDICATIONS: Resume taking daily medication.  Take prescribed pain medication with food.  If no medication is prescribed, you may take non-aspirin pain medication if needed.  PAIN MEDICATION CAN BE VERY CONSTIPATING.  Take a stool softener or laxative such as senokot, pericolace, or milk of magnesia if needed.    Prescription given pre-operatively      Last pain medication given at: None    You should CALL YOUR PHYSICIAN if you develop:  Fever greater than 101 degrees F.  Pain not relieved by medication, or persistent nausea or vomiting.  Excessive bleeding (blood soaking through dressing) or unexpected drainage from the wound.  Extreme redness or swelling around the incision site, drainage of pus or foul smelling drainage.  Inability to urinate or empty your bladder within 8 hours.    You should call 911 if you develop problems with breathing or chest pain.    If you are unable to contact your doctor or surgical center, you should go to the nearest emergency room or urgent care center.      Physician's telephone #: Dr. Del Valle [phone]    MILD  FLU-LIKE SYMPTOMS ARE NORMAL.  YOU MAY EXPERIENCE GENERALIZED MUSCLE ACHES, THROAT IRRITATION, HEADACHE AND/OR SOME NAUSEA.    If any questions arise, call your doctor.  If your doctor is not available, please feel free to call the Surgical Center at [phone].  The Center is open Monday through Friday from 7AM to 7PM.      A registered nurse may call you a few days after your surgery to see how you are doing after your procedure.    You may also receive a survey in the mail within the next two weeks and we ask that you take a few moments to complete the survey and return it to us.  Our goal is to provide you with very good care and we value your comments.     Depression / Suicide Risk    As you are discharged from this [facility], it is important to learn how to keep safe from harming yourself.    Recognize the warning signs:  Abrupt changes in personality, positive or negative- including increase in energy   Giving away possessions  Change in eating patterns- significant weight changes-  positive or negative  Change in sleeping patterns- unable to sleep or sleeping all the time   Unwillingness or inability to communicate  Depression  Unusual sadness, discouragement and loneliness  Talk of wanting to die  Neglect of personal appearance   Rebelliousness- reckless behavior  Withdrawal from people/activities they love  Confusion- inability to concentrate     If you or a loved one observes any of these behaviors or has concerns about self-harm, here's what you can do:  Talk about it- your feelings and reasons for harming yourself  Remove any means that you might use to hurt yourself (examples: pills, rope, extension cords, firearm)  Get professional help from the community (Mental Health, Substance Abuse, psychological counseling)  Do not be alone:Call your Safe Contact- someone whom you trust who will be there for you.  Call your local CRISIS HOTLINE 433-9487 or 188-399-6195  Call your local  Children's Mobile Crisis Response Team [place] (501) 032-0145 or www.[employer]  Call the toll free National Suicide Prevention Hotlines   National Suicide Prevention Lifeline 758-474-SIKQ (8273)  [place] [facility] Line Network 800-SUICIDE (981-5957)    I acknowledge receipt and understanding of these Home Care instructions.

## 2022-09-07 NOTE — OR NURSING
1032: Patient arrived to phase II from PACU 1 via gurney. Report received from RN. Respirations are spontaneous and unlabored. Dressing is CDI. VSS on RA.    1045: Family at bedside.     1055: Patient education completed, family denies further questions.     1100: PT at bedside.     1101: Report given to Lexie JIMENEZ.

## 2022-11-16 ENCOUNTER — HOSPITAL ENCOUNTER (OUTPATIENT)
Dept: RADIOLOGY | Facility: MEDICAL CENTER | Age: 72
End: 2022-11-16
Attending: ORTHOPAEDIC SURGERY
Payer: MEDICARE

## 2022-11-16 DIAGNOSIS — M17.0 PRIMARY OSTEOARTHRITIS OF BOTH KNEES: ICD-10-CM

## 2022-11-16 PROCEDURE — 73700 CT LOWER EXTREMITY W/O DYE: CPT | Mod: LT

## 2022-11-22 ENCOUNTER — DOCUMENTATION (OUTPATIENT)
Dept: HEALTH INFORMATION MANAGEMENT | Facility: OTHER | Age: 72
End: 2022-11-22
Payer: MEDICARE

## 2023-01-01 ENCOUNTER — APPOINTMENT (RX ONLY)
Dept: URBAN - METROPOLITAN AREA CLINIC 121 | Facility: CLINIC | Age: 73
Setting detail: DERMATOLOGY
End: 2023-01-01

## 2023-01-01 DIAGNOSIS — L21.8 OTHER SEBORRHEIC DERMATITIS: ICD-10-CM

## 2023-01-01 DIAGNOSIS — L81.4 OTHER MELANIN HYPERPIGMENTATION: ICD-10-CM

## 2023-01-01 DIAGNOSIS — Z71.89 OTHER SPECIFIED COUNSELING: ICD-10-CM

## 2023-01-01 DIAGNOSIS — Z85.820 PERSONAL HISTORY OF MALIGNANT MELANOMA OF SKIN: ICD-10-CM

## 2023-01-01 DIAGNOSIS — L85.3 XEROSIS CUTIS: ICD-10-CM

## 2023-01-01 DIAGNOSIS — D18.0 HEMANGIOMA: ICD-10-CM

## 2023-01-01 DIAGNOSIS — L82.1 OTHER SEBORRHEIC KERATOSIS: ICD-10-CM

## 2023-01-01 PROCEDURE — ? SUNSCREEN RECOMMENDATIONS

## 2023-01-01 PROCEDURE — ? PRESCRIPTION

## 2023-01-01 PROCEDURE — 99213 OFFICE O/P EST LOW 20 MIN: CPT

## 2023-01-01 RX ORDER — KETOCONAZOLE 20 MG/G
CREAM TOPICAL BID
Qty: 60 | Refills: 2 | Status: ERX | COMMUNITY
Start: 2023-01-01

## 2023-01-01 RX ORDER — AMMONIUM LACTATE 12 G/100G
LOTION TOPICAL QD
Qty: 225 | Refills: 1 | Status: ERX | COMMUNITY
Start: 2023-01-01

## 2023-01-01 RX ADMIN — KETOCONAZOLE: 20 CREAM TOPICAL at 00:00

## 2023-01-01 RX ADMIN — AMMONIUM LACTATE: 12 LOTION TOPICAL at 00:00

## 2023-01-01 ASSESSMENT — LOCATION DETAILED DESCRIPTION DERM
LOCATION DETAILED: LEFT SUPERIOR MEDIAL FOREHEAD
LOCATION DETAILED: RIGHT DISTAL RADIAL DORSAL FOREARM
LOCATION DETAILED: LEFT SUPERIOR MEDIAL FOREHEAD
LOCATION DETAILED: RIGHT SUPERIOR UPPER BACK
LOCATION DETAILED: RIGHT INFERIOR CENTRAL MALAR CHEEK
LOCATION DETAILED: LEFT INFERIOR LATERAL MALAR CHEEK
LOCATION DETAILED: LEFT SUPERIOR MEDIAL UPPER BACK
LOCATION DETAILED: LEFT PROXIMAL DORSAL FOREARM
LOCATION DETAILED: LEFT PROXIMAL DORSAL FOREARM
LOCATION DETAILED: LEFT DISTAL DORSAL FOREARM
LOCATION DETAILED: RIGHT MEDIAL UPPER BACK
LOCATION DETAILED: RIGHT INFERIOR CENTRAL MALAR CHEEK
LOCATION DETAILED: PERIUMBILICAL SKIN
LOCATION DETAILED: LEFT INFERIOR LATERAL MALAR CHEEK
LOCATION DETAILED: RIGHT DISTAL DORSAL FOREARM

## 2023-01-01 ASSESSMENT — LOCATION SIMPLE DESCRIPTION DERM
LOCATION SIMPLE: RIGHT FOREARM
LOCATION SIMPLE: LEFT FOREARM
LOCATION SIMPLE: RIGHT CHEEK
LOCATION SIMPLE: RIGHT FOREARM
LOCATION SIMPLE: RIGHT UPPER BACK
LOCATION SIMPLE: LEFT CHEEK
LOCATION SIMPLE: LEFT FOREHEAD
LOCATION SIMPLE: RIGHT CHEEK
LOCATION SIMPLE: LEFT UPPER BACK
LOCATION SIMPLE: LEFT CHEEK
LOCATION SIMPLE: LEFT FOREARM
LOCATION SIMPLE: ABDOMEN
LOCATION SIMPLE: LEFT FOREHEAD

## 2023-01-01 ASSESSMENT — LOCATION ZONE DERM
LOCATION ZONE: FACE
LOCATION ZONE: TRUNK
LOCATION ZONE: ARM
LOCATION ZONE: FACE
LOCATION ZONE: ARM

## 2023-01-19 ENCOUNTER — PRE-ADMISSION TESTING (OUTPATIENT)
Dept: ADMISSIONS | Facility: MEDICAL CENTER | Age: 73
End: 2023-01-19
Attending: ORTHOPAEDIC SURGERY
Payer: MEDICARE

## 2023-01-19 DIAGNOSIS — Z01.812 PRE-OPERATIVE LABORATORY EXAMINATION: ICD-10-CM

## 2023-01-19 LAB
EST. AVERAGE GLUCOSE BLD GHB EST-MCNC: 137 MG/DL
HBA1C MFR BLD: 6.4 % (ref 4–5.6)
SCCMEC + MECA PNL NOSE NAA+PROBE: NEGATIVE
SCCMEC + MECA PNL NOSE NAA+PROBE: NEGATIVE

## 2023-01-19 PROCEDURE — 87641 MR-STAPH DNA AMP PROBE: CPT

## 2023-01-19 PROCEDURE — 83036 HEMOGLOBIN GLYCOSYLATED A1C: CPT

## 2023-01-19 PROCEDURE — 87640 STAPH A DNA AMP PROBE: CPT

## 2023-01-19 PROCEDURE — 36415 COLL VENOUS BLD VENIPUNCTURE: CPT

## 2023-01-19 ASSESSMENT — FIBROSIS 4 INDEX: FIB4 SCORE: 1.72

## 2023-01-19 NOTE — PREPROCEDURE INSTRUCTIONS
Hx and meds reviewed, pre op instructions given, handouts reviewed and emailed, questions answered.  Pt instructed to continue regularly prescribed medications through day before surgery.Per anesthesia protocol pt instructed to take these medications with a sip of water the day of surgery-simvastatin.   Anesthesia fasting guidelines reviewed with pt. Pt aware to continue to wear a mask and report any new flu/cold sx. Pt referred to Mandi JIMENEZ for discharge planning.

## 2023-01-19 NOTE — DISCHARGE PLANNING
DISCHARGE PLANNING NOTE - TOTAL JOINT    Procedure: Procedure(s):  LEFT TOTAL KNEE ARTHROPLASTY  Procedure Date: 2/1/2023  Insurance: Payor: [facility] SENIOR CARE PLUS / Plan: [facility] MICKY MANUEL  / Product Type: Medicare Advantage /    Equipment currently available at home?  front-wheel walker  Steps into the home? 2  Steps within the home? 0  Toilet height? Standard  Type of shower? walk-in shower  Who will be with you during your recovery? spouse  Is Outpatient Physical Therapy set up after surgery? Yes  Did you take the Total Joint Class and where? Yes  Planning same day discharge?Yes     This writer met with pt during his preadmission appt and educated to preop showers, nasal mrsa screen and potential for overnight stay. Home safety checklist reviewed and copy given to pt. Pt educated to dc criteria. All questions answered and verbalizes understanding of all instructions. No dc needs identified at this time. Anticipate dc to home without barriers.

## 2023-01-31 ENCOUNTER — ANESTHESIA EVENT (OUTPATIENT)
Dept: SURGERY | Facility: MEDICAL CENTER | Age: 73
End: 2023-01-31
Payer: MEDICARE

## 2023-02-01 ENCOUNTER — ANESTHESIA (OUTPATIENT)
Dept: SURGERY | Facility: MEDICAL CENTER | Age: 73
End: 2023-02-01
Payer: MEDICARE

## 2023-02-01 ENCOUNTER — APPOINTMENT (OUTPATIENT)
Dept: RADIOLOGY | Facility: MEDICAL CENTER | Age: 73
End: 2023-02-01
Attending: ORTHOPAEDIC SURGERY
Payer: MEDICARE

## 2023-02-01 ENCOUNTER — HOSPITAL ENCOUNTER (OUTPATIENT)
Facility: MEDICAL CENTER | Age: 73
End: 2023-02-01
Attending: ORTHOPAEDIC SURGERY | Admitting: ORTHOPAEDIC SURGERY
Payer: MEDICARE

## 2023-02-01 VITALS
TEMPERATURE: 97.2 F | HEART RATE: 78 BPM | BODY MASS INDEX: 26.4 KG/M2 | OXYGEN SATURATION: 95 % | DIASTOLIC BLOOD PRESSURE: 81 MMHG | WEIGHT: 194.89 LBS | RESPIRATION RATE: 17 BRPM | HEIGHT: 72 IN | SYSTOLIC BLOOD PRESSURE: 147 MMHG

## 2023-02-01 PROCEDURE — 160029 HCHG SURGERY MINUTES - 1ST 30 MINS LEVEL 4: Performed by: ORTHOPAEDIC SURGERY

## 2023-02-01 PROCEDURE — 64447 NJX AA&/STRD FEMORAL NRV IMG: CPT | Performed by: ORTHOPAEDIC SURGERY

## 2023-02-01 PROCEDURE — 700105 HCHG RX REV CODE 258: Performed by: ORTHOPAEDIC SURGERY

## 2023-02-01 PROCEDURE — 700101 HCHG RX REV CODE 250: Performed by: ORTHOPAEDIC SURGERY

## 2023-02-01 PROCEDURE — 700101 HCHG RX REV CODE 250: Performed by: STUDENT IN AN ORGANIZED HEALTH CARE EDUCATION/TRAINING PROGRAM

## 2023-02-01 PROCEDURE — 700111 HCHG RX REV CODE 636 W/ 250 OVERRIDE (IP): Performed by: ORTHOPAEDIC SURGERY

## 2023-02-01 PROCEDURE — 97161 PT EVAL LOW COMPLEX 20 MIN: CPT

## 2023-02-01 PROCEDURE — 160002 HCHG RECOVERY MINUTES (STAT): Performed by: ORTHOPAEDIC SURGERY

## 2023-02-01 PROCEDURE — 99100 ANES PT EXTEME AGE<1 YR&>70: CPT | Performed by: STUDENT IN AN ORGANIZED HEALTH CARE EDUCATION/TRAINING PROGRAM

## 2023-02-01 PROCEDURE — 160046 HCHG PACU - 1ST 60 MINS PHASE II: Performed by: ORTHOPAEDIC SURGERY

## 2023-02-01 PROCEDURE — 73560 X-RAY EXAM OF KNEE 1 OR 2: CPT | Mod: LT

## 2023-02-01 PROCEDURE — 700111 HCHG RX REV CODE 636 W/ 250 OVERRIDE (IP): Performed by: STUDENT IN AN ORGANIZED HEALTH CARE EDUCATION/TRAINING PROGRAM

## 2023-02-01 PROCEDURE — 160048 HCHG OR STATISTICAL LEVEL 1-5: Performed by: ORTHOPAEDIC SURGERY

## 2023-02-01 PROCEDURE — 160047 HCHG PACU  - EA ADDL 30 MINS PHASE II: Performed by: ORTHOPAEDIC SURGERY

## 2023-02-01 PROCEDURE — 160009 HCHG ANES TIME/MIN: Performed by: ORTHOPAEDIC SURGERY

## 2023-02-01 PROCEDURE — 502000 HCHG MISC OR IMPLANTS RC 0278: Performed by: ORTHOPAEDIC SURGERY

## 2023-02-01 PROCEDURE — A9270 NON-COVERED ITEM OR SERVICE: HCPCS | Performed by: STUDENT IN AN ORGANIZED HEALTH CARE EDUCATION/TRAINING PROGRAM

## 2023-02-01 PROCEDURE — 64447 NJX AA&/STRD FEMORAL NRV IMG: CPT | Mod: 59,LT | Performed by: STUDENT IN AN ORGANIZED HEALTH CARE EDUCATION/TRAINING PROGRAM

## 2023-02-01 PROCEDURE — 160041 HCHG SURGERY MINUTES - EA ADDL 1 MIN LEVEL 4: Performed by: ORTHOPAEDIC SURGERY

## 2023-02-01 PROCEDURE — 01402 ANES OPN/ARTH TOT KNE ARTHRP: CPT | Performed by: STUDENT IN AN ORGANIZED HEALTH CARE EDUCATION/TRAINING PROGRAM

## 2023-02-01 PROCEDURE — 502240 HCHG MISC OR SUPPLY RC 0272: Performed by: ORTHOPAEDIC SURGERY

## 2023-02-01 PROCEDURE — 160025 RECOVERY II MINUTES (STATS): Performed by: ORTHOPAEDIC SURGERY

## 2023-02-01 PROCEDURE — C1713 ANCHOR/SCREW BN/BN,TIS/BN: HCPCS | Performed by: ORTHOPAEDIC SURGERY

## 2023-02-01 PROCEDURE — C1776 JOINT DEVICE (IMPLANTABLE): HCPCS | Performed by: ORTHOPAEDIC SURGERY

## 2023-02-01 PROCEDURE — 700102 HCHG RX REV CODE 250 W/ 637 OVERRIDE(OP): Performed by: STUDENT IN AN ORGANIZED HEALTH CARE EDUCATION/TRAINING PROGRAM

## 2023-02-01 PROCEDURE — 97110 THERAPEUTIC EXERCISES: CPT

## 2023-02-01 PROCEDURE — 160035 HCHG PACU - 1ST 60 MINS PHASE I: Performed by: ORTHOPAEDIC SURGERY

## 2023-02-01 DEVICE — CEMENT ORTHOPEDIC HV US  (10/PK): Type: IMPLANTABLE DEVICE | Status: FUNCTIONAL

## 2023-02-01 DEVICE — IMPLANTABLE DEVICE: Type: IMPLANTABLE DEVICE | Status: FUNCTIONAL

## 2023-02-01 RX ORDER — TRANEXAMIC ACID 100 MG/ML
INJECTION, SOLUTION INTRAVENOUS PRN
Status: DISCONTINUED | OUTPATIENT
Start: 2023-02-01 | End: 2023-02-01 | Stop reason: SURG

## 2023-02-01 RX ORDER — HYDROMORPHONE HYDROCHLORIDE 1 MG/ML
0.1 INJECTION, SOLUTION INTRAMUSCULAR; INTRAVENOUS; SUBCUTANEOUS
Status: DISCONTINUED | OUTPATIENT
Start: 2023-02-01 | End: 2023-02-01 | Stop reason: HOSPADM

## 2023-02-01 RX ORDER — ROPIVACAINE HYDROCHLORIDE 5 MG/ML
INJECTION, SOLUTION EPIDURAL; INFILTRATION; PERINEURAL PRN
Status: DISCONTINUED | OUTPATIENT
Start: 2023-02-01 | End: 2023-02-01 | Stop reason: HOSPADM

## 2023-02-01 RX ORDER — ROPIVACAINE HYDROCHLORIDE 5 MG/ML
INJECTION, SOLUTION EPIDURAL; INFILTRATION; PERINEURAL
Status: DISCONTINUED | OUTPATIENT
Start: 2023-02-01 | End: 2023-02-01 | Stop reason: HOSPADM

## 2023-02-01 RX ORDER — KETOROLAC TROMETHAMINE 30 MG/ML
INJECTION, SOLUTION INTRAMUSCULAR; INTRAVENOUS
Status: DISCONTINUED | OUTPATIENT
Start: 2023-02-01 | End: 2023-02-01 | Stop reason: HOSPADM

## 2023-02-01 RX ORDER — SODIUM CHLORIDE, SODIUM LACTATE, POTASSIUM CHLORIDE, CALCIUM CHLORIDE 600; 310; 30; 20 MG/100ML; MG/100ML; MG/100ML; MG/100ML
INJECTION, SOLUTION INTRAVENOUS CONTINUOUS
Status: ACTIVE | OUTPATIENT
Start: 2023-02-01 | End: 2023-02-01

## 2023-02-01 RX ORDER — EPINEPHRINE 1 MG/ML(1)
AMPUL (ML) INJECTION
Status: DISCONTINUED | OUTPATIENT
Start: 2023-02-01 | End: 2023-02-01 | Stop reason: HOSPADM

## 2023-02-01 RX ORDER — LIDOCAINE HYDROCHLORIDE 20 MG/ML
INJECTION, SOLUTION EPIDURAL; INFILTRATION; INTRACAUDAL; PERINEURAL PRN
Status: DISCONTINUED | OUTPATIENT
Start: 2023-02-01 | End: 2023-02-01 | Stop reason: SURG

## 2023-02-01 RX ORDER — ONDANSETRON 2 MG/ML
4 INJECTION INTRAMUSCULAR; INTRAVENOUS
Status: DISCONTINUED | OUTPATIENT
Start: 2023-02-01 | End: 2023-02-01 | Stop reason: HOSPADM

## 2023-02-01 RX ORDER — MAGNESIUM SULFATE HEPTAHYDRATE 40 MG/ML
INJECTION, SOLUTION INTRAVENOUS PRN
Status: DISCONTINUED | OUTPATIENT
Start: 2023-02-01 | End: 2023-02-01 | Stop reason: SURG

## 2023-02-01 RX ORDER — ACETAMINOPHEN 500 MG
1000 TABLET ORAL ONCE
Status: COMPLETED | OUTPATIENT
Start: 2023-02-01 | End: 2023-02-01

## 2023-02-01 RX ORDER — CEFAZOLIN SODIUM 1 G/3ML
2 INJECTION, POWDER, FOR SOLUTION INTRAMUSCULAR; INTRAVENOUS ONCE
Status: COMPLETED | OUTPATIENT
Start: 2023-02-01 | End: 2023-02-01

## 2023-02-01 RX ORDER — SODIUM CHLORIDE, SODIUM LACTATE, POTASSIUM CHLORIDE, CALCIUM CHLORIDE 600; 310; 30; 20 MG/100ML; MG/100ML; MG/100ML; MG/100ML
INJECTION, SOLUTION INTRAVENOUS CONTINUOUS
Status: DISCONTINUED | OUTPATIENT
Start: 2023-02-01 | End: 2023-02-01 | Stop reason: HOSPADM

## 2023-02-01 RX ORDER — TRAMADOL HYDROCHLORIDE 50 MG/1
50 TABLET ORAL EVERY 6 HOURS PRN
COMMUNITY
End: 2023-02-22

## 2023-02-01 RX ORDER — MEPERIDINE HYDROCHLORIDE 25 MG/ML
12.5 INJECTION INTRAMUSCULAR; INTRAVENOUS; SUBCUTANEOUS
Status: DISCONTINUED | OUTPATIENT
Start: 2023-02-01 | End: 2023-02-01 | Stop reason: HOSPADM

## 2023-02-01 RX ORDER — HYDROMORPHONE HYDROCHLORIDE 2 MG/ML
INJECTION, SOLUTION INTRAMUSCULAR; INTRAVENOUS; SUBCUTANEOUS PRN
Status: DISCONTINUED | OUTPATIENT
Start: 2023-02-01 | End: 2023-02-01 | Stop reason: SURG

## 2023-02-01 RX ORDER — MORPHINE SULFATE 0.5 MG/ML
INJECTION, SOLUTION EPIDURAL; INTRATHECAL; INTRAVENOUS
Status: DISCONTINUED | OUTPATIENT
Start: 2023-02-01 | End: 2023-02-01 | Stop reason: HOSPADM

## 2023-02-01 RX ORDER — HYDROCODONE BITARTRATE AND ACETAMINOPHEN 5; 325 MG/1; MG/1
1 TABLET ORAL EVERY 6 HOURS PRN
COMMUNITY

## 2023-02-01 RX ORDER — ONDANSETRON 2 MG/ML
INJECTION INTRAMUSCULAR; INTRAVENOUS PRN
Status: DISCONTINUED | OUTPATIENT
Start: 2023-02-01 | End: 2023-02-01 | Stop reason: HOSPADM

## 2023-02-01 RX ORDER — HYDROMORPHONE HYDROCHLORIDE 1 MG/ML
0.4 INJECTION, SOLUTION INTRAMUSCULAR; INTRAVENOUS; SUBCUTANEOUS
Status: DISCONTINUED | OUTPATIENT
Start: 2023-02-01 | End: 2023-02-01 | Stop reason: HOSPADM

## 2023-02-01 RX ORDER — SODIUM CHLORIDE 9 MG/ML
INJECTION, SOLUTION INTRAMUSCULAR; INTRAVENOUS; SUBCUTANEOUS
Status: DISCONTINUED | OUTPATIENT
Start: 2023-02-01 | End: 2023-02-01 | Stop reason: HOSPADM

## 2023-02-01 RX ORDER — HYDROMORPHONE HYDROCHLORIDE 1 MG/ML
0.2 INJECTION, SOLUTION INTRAMUSCULAR; INTRAVENOUS; SUBCUTANEOUS
Status: DISCONTINUED | OUTPATIENT
Start: 2023-02-01 | End: 2023-02-01 | Stop reason: HOSPADM

## 2023-02-01 RX ORDER — HALOPERIDOL 5 MG/ML
1 INJECTION INTRAMUSCULAR
Status: DISCONTINUED | OUTPATIENT
Start: 2023-02-01 | End: 2023-02-01 | Stop reason: HOSPADM

## 2023-02-01 RX ORDER — DEXAMETHASONE SODIUM PHOSPHATE 4 MG/ML
INJECTION, SOLUTION INTRA-ARTICULAR; INTRALESIONAL; INTRAMUSCULAR; INTRAVENOUS; SOFT TISSUE PRN
Status: DISCONTINUED | OUTPATIENT
Start: 2023-02-01 | End: 2023-02-01 | Stop reason: SURG

## 2023-02-01 RX ADMIN — ROPIVACAINE HYDROCHLORIDE 20 ML: 5 INJECTION, SOLUTION EPIDURAL; INFILTRATION; PERINEURAL at 06:56

## 2023-02-01 RX ADMIN — CEFAZOLIN 2 G: 330 INJECTION, POWDER, FOR SOLUTION INTRAMUSCULAR; INTRAVENOUS at 07:11

## 2023-02-01 RX ADMIN — ACETAMINOPHEN 1000 MG: 500 TABLET ORAL at 06:18

## 2023-02-01 RX ADMIN — FENTANYL CITRATE 50 MCG: 50 INJECTION, SOLUTION INTRAMUSCULAR; INTRAVENOUS at 08:19

## 2023-02-01 RX ADMIN — HYDROMORPHONE HYDROCHLORIDE 0.5 MG: 2 INJECTION INTRAMUSCULAR; INTRAVENOUS; SUBCUTANEOUS at 07:06

## 2023-02-01 RX ADMIN — TRANEXAMIC ACID 1000 MG: 100 INJECTION, SOLUTION INTRAVENOUS at 08:09

## 2023-02-01 RX ADMIN — HYDROMORPHONE HYDROCHLORIDE 0.5 MG: 2 INJECTION INTRAMUSCULAR; INTRAVENOUS; SUBCUTANEOUS at 07:30

## 2023-02-01 RX ADMIN — FENTANYL CITRATE 25 MCG: 50 INJECTION, SOLUTION INTRAMUSCULAR; INTRAVENOUS at 09:05

## 2023-02-01 RX ADMIN — ONDANSETRON 4 MG: 2 INJECTION INTRAMUSCULAR; INTRAVENOUS at 08:14

## 2023-02-01 RX ADMIN — LIDOCAINE HYDROCHLORIDE 30 MG: 20 INJECTION, SOLUTION EPIDURAL; INFILTRATION; INTRACAUDAL at 07:07

## 2023-02-01 RX ADMIN — MAGNESIUM SULFATE HEPTAHYDRATE 2 G: 40 INJECTION, SOLUTION INTRAVENOUS at 07:50

## 2023-02-01 RX ADMIN — DEXAMETHASONE SODIUM PHOSPHATE 1.3 MG: 4 INJECTION, SOLUTION INTRA-ARTICULAR; INTRALESIONAL; INTRAMUSCULAR; INTRAVENOUS; SOFT TISSUE at 06:56

## 2023-02-01 RX ADMIN — PROPOFOL 160 MG: 10 INJECTION, EMULSION INTRAVENOUS at 07:07

## 2023-02-01 RX ADMIN — FENTANYL CITRATE 50 MCG: 50 INJECTION, SOLUTION INTRAMUSCULAR; INTRAVENOUS at 07:06

## 2023-02-01 RX ADMIN — DEXAMETHASONE SODIUM PHOSPHATE 6 MG: 4 INJECTION, SOLUTION INTRA-ARTICULAR; INTRALESIONAL; INTRAMUSCULAR; INTRAVENOUS; SOFT TISSUE at 07:12

## 2023-02-01 RX ADMIN — SODIUM CHLORIDE, POTASSIUM CHLORIDE, SODIUM LACTATE AND CALCIUM CHLORIDE: 600; 310; 30; 20 INJECTION, SOLUTION INTRAVENOUS at 06:18

## 2023-02-01 RX ADMIN — TRANEXAMIC ACID 1000 MG: 100 INJECTION, SOLUTION INTRAVENOUS at 07:12

## 2023-02-01 ASSESSMENT — PAIN DESCRIPTION - PAIN TYPE
TYPE: ACUTE PAIN
TYPE: SURGICAL PAIN

## 2023-02-01 ASSESSMENT — COGNITIVE AND FUNCTIONAL STATUS - GENERAL
MOVING FROM LYING ON BACK TO SITTING ON SIDE OF FLAT BED: A LITTLE
TURNING FROM BACK TO SIDE WHILE IN FLAT BAD: A LITTLE
CLIMB 3 TO 5 STEPS WITH RAILING: A LITTLE
STANDING UP FROM CHAIR USING ARMS: A LITTLE
MOBILITY SCORE: 18
SUGGESTED CMS G CODE MODIFIER MOBILITY: CK
WALKING IN HOSPITAL ROOM: A LITTLE
MOVING TO AND FROM BED TO CHAIR: A LITTLE

## 2023-02-01 ASSESSMENT — FIBROSIS 4 INDEX: FIB4 SCORE: 1.72

## 2023-02-01 ASSESSMENT — GAIT ASSESSMENTS
DEVIATION: DECREASED HEEL STRIKE;DECREASED TOE OFF
ASSISTIVE DEVICE: FRONT WHEEL WALKER
GAIT LEVEL OF ASSIST: SUPERVISED
DISTANCE (FEET): 140

## 2023-02-01 NOTE — ANESTHESIA TIME REPORT
Anesthesia Start and Stop Event Times     Date Time Event    2/1/2023 0703 Ready for Procedure     0703 Anesthesia Start     0827 Anesthesia Stop        Responsible Staff  02/01/23    Name Role Begin End    Gilbert Verma M.D. Anesth 0703 0827        Overtime Reason:  no overtime (within assigned shift)    Comments:

## 2023-02-01 NOTE — OR NURSING
0825- [facility]  L knee dressing CDI; pedal pulse 2+. Cold pack applied to L knee. +block.     Sedation/Resp Status: Non responsive to verbal with no OPA in place. Respirations spontaneous and non-labored.    HR 82SR; VSS on 6L 02 via simple mask.  The patient does not appear to be in pain or nauseous as evidence by sleeping.    0840- Tolerating sips of clears with no nausea. The patient reports 6/10 pain at the surgical site. Medications offered and the patient declined. No change in the dressing.    0855- No change in the patient condition. The dressing remains CDI. The patient is still declining pain meds    0900- xray tech at bedside    0906- Medicated per MAR for 6/10 pain    0913- Left voicemail for family. March and stand test performed and patient passed.    0921- Med hold complete. Report called to Marilyn JIMENEZ. Transferred to phase 2 for DC

## 2023-02-01 NOTE — OR NURSING
0530: Brought patient back to pre-op and assumed care.   0622: Patient allergies and NPO status verified, home medication reconciliation completed and belongings secured. Patient verbalizes understanding of pain scale, expected course of stay and plan of care. Surgical site verified with patient. IV access established. Sequentials placed on legs.

## 2023-02-01 NOTE — ANESTHESIA POSTPROCEDURE EVALUATION
Patient: Raymundo Jackson    Procedure Summary     Date: 02/01/23 Room / Location:  OR 03 / SURGERY [facility]    Anesthesia Start: 0703 Anesthesia Stop: 0827    Procedure: LEFT TOTAL KNEE ARTHROPLASTY (Left: Knee) Diagnosis: (OSTEOARTHRITIS OF KNEE)    Surgeons: Brodie Del Valle M.D. Responsible Provider: Gilbert Verma M.D.    Anesthesia Type: general, peripheral nerve block ASA Status: 2          Final Anesthesia Type: general, peripheral nerve block  Last vitals  BP   Blood Pressure : (!) 147/81    Temp   36.2 °C (97.2 °F)    Pulse   78   Resp   17    SpO2   95 %      Anesthesia Post Evaluation    Patient location during evaluation: PACU  Patient participation: complete - patient participated  Level of consciousness: awake and alert    Airway patency: patent  Anesthetic complications: no  Cardiovascular status: hemodynamically stable  Respiratory status: acceptable  Hydration status: euvolemic    PONV: none          There were no known notable events for this encounter.     Nurse Pain Score: 6 (NPRS)

## 2023-02-01 NOTE — OR SURGEON
Immediate Post OP Note    PreOp Diagnosis: lt knee DJD      PostOp Diagnosis: same      Procedure(s):  LEFT TOTAL KNEE ARTHROPLASTY - Wound Class: Clean    Surgeon(s):  Brodie Del Valle M.D.    Anesthesiologist/Type of Anesthesia:  Anesthesiologist: Gilbert Verma M.D./General    Surgical Staff:  Circulator: Dory Wells R.N.; Jen Parra R.N.  Limb Danielson: Christine Lucio  Relief Scrub: Judah Prinec  Scrub Person: Elgin Jack Assist: Luis E Cast C.N.ARe    Specimens removed if any:  * No specimens in log *    Estimated Blood Loss: minimal    Findings: severe DJD lt knee    Complications: none        2/1/2023 8:25 AM Brodie Del Valle M.D.

## 2023-02-01 NOTE — OP REPORT
DATE OF SERVICE:  02/01/2023     PREOPERATIVE DIAGNOSIS:  Left knee degenerative joint disease.     POSTOPERATIVE DIAGNOSIS:  Left knee degenerative joint disease.     PROCEDURE:  Left total knee arthroplasty.     SURGEON:  Brodie Del Valle MD     ASSISTANT:  Luis E Cast CFA     ANESTHESIA:  General endotracheal anesthesia with adductor canal block.     ANESTHESIOLOGIST:  Gilbert Verma MD     ESTIMATED BLOOD LOSS:  Minimal.     COMPLICATIONS:  None.     TOURNIQUET TIME:  46 minutes.     INDICATIONS FOR PROCEDURE:  This is a 72-year-old male with severe   degenerative joint disease of his left knee.  He has failed conservative   management including home exercise program, anti-inflammatories,   intra-articular injections.  He presents for operative treatment.  For further   details of H and P, please see chart.     Risks, benefits and alternatives of procedure were discussed with the patient   and include but are not limited to bleeding, infection, neurovascular injury,   need for further surgery, PE, DVT, blood transfusion with its associated   risks, anesthesia with its associated risks, and death.  All questions were   answered.  No warranties or guarantees were given or implied.  Consent is   signed and is on chart.     DESCRIPTION OF PROCEDURE:  The patient was taken to the operating room and   placed supine on the operating table.  Prior to this, an adductor canal block   was placed in preoperative holding for postoperative pain control.  General   endotracheal anesthesia was induced.  The patient's left lower extremity was   prepped and draped in normal sterile fashion.  Leg was exsanguinated and   tourniquet inflated to 250 mmHg.  A 25 cm incision was made over the anterior   aspect of the knee.  He was taken sharply through skin and subcutaneous   tissue.  Bovie cautery was used to attain hemostasis.  Dissection was carried   down to the level of patellar retinaculum.  Flaps were elevated in medial and    lateral aspect.  Medial parapatellar arthrotomy was performed.  Medial release   was performed.  Retropatellar fat pad was excised.  Patella was everted, knee   flexed 90 degrees.  Cartilage removed off the distal femur.  Distal femoral   patient-specific resection guide was placed and pinned into position.  Distal   femoral resection was performed.  Anterior resection guide was placed and the   anterior condyle, posterior condyle, and anterior chamfer were cut.  Bony   pieces were removed.  Posterior chamfer guide was placed and posterior   chamfers were cut.  Notch guide was placed and centered and the notch was cut   without difficulty.  This was checked with resection guide and felt to be   adequate.  Attention was then placed on the tibia.  All soft tissue was   removed from the proximal end of the tibia.  Cartilage was removed with   appropriate position.  The patient-specific tibial guide was pinned into   position.  Tibial resection was performed.  All bony prominences were removed   without difficulty.  Posterior release was performed at this time.  Trials   were placed, which showed excellent range of motion with good stability.  At   this point, 9.5 mm resection was performed on the patella.  This was sized to   a size 44, which was drilled.  Trial fit quite nicely.  Trials were then   removed.  Tibial guide was placed and the keel was drilled and then punched.    The knee was then thoroughly lavaged.  Posterior capsule was injected with   combination of Marcaine, Toradol and Duramorph.  The ConforMIS implant knee   was cemented into position with a 7 mm poly and a 44 mm medial dome patella.    All excess cement was removed.  Knee was thoroughly lavaged with Betadine.    Then, relavaged with saline.  Final evaluation showed excellent range of   motion of the knee with good stability.  Tourniquet was dropped.  Adequate   hemostasis was achieved.  Parapatellar arthrotomy was closed using #2 Quill.     Subcutaneous tissue closed using 2-0 Vicryl.  Skin closed using staples.  The   patient was placed in a soft sterile dressing.  He was awakened from   anesthesia and returned to recovery cart in the recovery room in stable   condition.  There were no anam or intraoperative complications noted.        ______________________________  MD GABBIE Yu/LEOBRADO    DD:  02/01/2023 08:29  DT:  02/01/2023 08:54    Job#:  [id number]

## 2023-02-01 NOTE — OR NURSING
0921:Patient to Phase II from PACU. Patient assisted with dressing with help from CNA. Awaiting PT/OT    1000: PT at bedside    1045: Cleared by PT    1050: Discharge education completed and family denies further questions.     1104: Discharged to care of family post uneventful stay in phase II recovery.

## 2023-02-01 NOTE — ANESTHESIA PROCEDURE NOTES
Airway    Date/Time: 2/1/2023 7:08 AM  Performed by: Gilbert Verma M.D.  Authorized by: Gilbert Verma M.D.     Location:  OR  Urgency:  Elective  Indications for Airway Management:  Anesthesia      Spontaneous Ventilation: absent    Sedation Level:  Deep  Preoxygenated: Yes    Final Airway Type:  Supraglottic airway  Final Supraglottic Airway:  Standard LMA    SGA Size:  4  Number of Attempts at Approach:  1

## 2023-02-01 NOTE — ANESTHESIA PREPROCEDURE EVALUATION
Case: [id number] Date/Time: 02/01/23 0645    Procedure: LEFT TOTAL KNEE ARTHROPLASTY    Pre-op diagnosis: OSTEOARTHRITIS OF KNEE    Location: [facility] OR 03 / SURGERY [facility]    Surgeons: Brodie Del Valle M.D.          Relevant Problems   No relevant active problems       Physical Exam    Airway   Mallampati: II  TM distance: >3 FB  Neck ROM: full       Cardiovascular - normal exam  Rhythm: regular  Rate: normal     Dental - normal exam           Pulmonary - normal exam     Abdominal    Neurological - normal exam                 Anesthesia Plan    ASA 2       Plan - general and peripheral nerve block     Peripheral nerve block will be post-op pain control  Airway plan will be LMA          Induction: intravenous    Postoperative Plan: Postoperative administration of opioids is intended.    Pertinent diagnostic labs and testing reviewed    Informed Consent:    Anesthetic plan and risks discussed with patient.    Use of blood products discussed with: patient whom consented to blood products.

## 2023-02-01 NOTE — DISCHARGE INSTRUCTIONS
If any questions arise, call your provider.  If your provider is not available, please feel free to call the Surgical Center at [phone].    MEDICATIONS: Resume taking daily medication.  Take prescribed pain medication with food.  If no medication is prescribed, you may take non-aspirin pain medication if needed.  PAIN MEDICATION CAN BE VERY CONSTIPATING.  Take a stool softener or laxative such as senokot, pericolace, or milk of magnesia if needed.    Last pain medication given at ________________    What to Expect Post Anesthesia    Rest and take it easy for the first 24 hours.  A responsible adult is recommended to remain with you during that time.  It is normal to feel sleepy.  We encourage you to not do anything that requires balance, judgment or coordination.    FOR 24 HOURS DO NOT:  Drive, operate machinery or run household appliances.  Drink beer or alcoholic beverages.  Make important decisions or sign legal documents.    To avoid nausea, slowly advance diet as tolerated, avoiding spicy or greasy foods for the first day.  Add more substantial food to your diet according to your provider's instructions. INCREASE FLUIDS AND FIBER TO AVOID CONSTIPATION.    MILD FLU-LIKE SYMPTOMS ARE NORMAL.  YOU MAY EXPERIENCE GENERALIZED MUSCLE ACHES, THROAT IRRITATION, HEADACHE AND/OR SOME NAUSEA.    Diet  Resume your normal diet as tolerated.  A diet low in cholesterol, fat, and sodium is recommended for good health.     Activity:   The first week after your knee joint replacement is a time to recover from the surgery. We expect light exercise to keep you active and mobile.    LEFT LEG:   WEIGHT BEARING AS TOLERATED    When you stand or walk, place only as much weight as feels comfortable on your injured leg.  Let pain be your guide. If you feel pain, place less weight on the leg.     ASSISTED DEVICES: You are being discharged with the following special equipment:  Not Applicable    Dressing and Wound Care:    Keep the  dressing clean and dry. You may remove the wrap dressing in 3 days to shower. Leave the aquacel dressing in place. This dressing will be removed at your follow up appointment.    Shower / Bathing:    OK to shower, keep waterproof aquacel dressing in place. Pat dressing dry, do not rub the incision.  Swimming pools, hot tubs, or baths are to be avoided until after your follow-up and then only if cleared by your surgeon.      Apply Ice Pack to Knee:   Apply ice packs to your knee (15 minutes on the knee, 15 minutes off the knee) for the first week, as you feel necessary to help with the pain and swelling.    SWELLING/BRUISING  Swelling is an expected response to surgery. To reduce swelling, elevate your surgical limb above heart level multiple times per day and apply ice (see Ice instructions). If your swelling becomes excessive, is limiting your ability to move, or becomes worrisome please contact your doctor's office.    Bruising often does not appear until after you arrive home and can be quite dramatic-appearing purple, black, or green. Bruising is typically not concerning and will subside without any treatment.     FOLLOW-UP  Make sure that you have an appointment 7-14 days following surgery. Your procedure/rehabilitation will be discussed and physical therapy may be prescribed at that time.    Peripheral Nerve Block Discharge Instructions from Same Day Surgery and Inpatient :    What to Expect - Lower Extremity  The block may cause you to experience numbness and weakness in your hip and thigh, thigh and knee, or calf and foot on the same side as your surgery  Numbness, tingling and / or weakness are all normal. For some people, this may be an unpleasant sensation  These issues will be resolved when the local anesthetic wears off   You may experience numbness and tingling in your thigh on the same side as your surgery if the block medicine was injected at your groin area  Numbness will make it difficult to  "walk  You may have problems with balance and walking so be very careful   Follow your surgeon's direction regarding weight bearing on your surgical limb  Be very careful with your numb limb  Precautions  The numbness may affect your balance  Be careful when walking or moving around  Your leg may be weak: be very careful putting weight on it  If your surgeon did not specify a time, you should not bear weight for 24 hours  Be sure to ask for help when you need it  It is better to have help than to fall and hurt yourself  Prevent Injury  Protect the limb like a baby  Beware of exposing your limb to extreme heat or cold or trauma  The limb may be injured without you noticing because it is numb  Keep the limb elevated whenever possible  Do not sleep on the limb  Change the position of the limb regularly  Avoid putting pressure on your surgical limb  Pain Control  The initial block on the day of surgery will make your extremity feel \"numb\"  Any consecutive injection including prior to discharge from the hospital will make your extremity feel \"numb\"  You may feel an aching or burning when the local anesthesia starts to wear off  Take pain pills as prescribed by your surgeon  Call your surgeon or anesthesiologist if you do not have adequate pain control                "

## 2023-02-01 NOTE — ANESTHESIA PROCEDURE NOTES
Peripheral Block    Date/Time: 2/1/2023 6:49 AM  Performed by: Gilbert Verma M.D.  Authorized by: Gilbert Verma M.D.     Patient Location:  Pre-op  Start Time:  2/1/2023 6:49 AM  End Time:  2/1/2023 6:56 AM  Reason for Block: at surgeon's request and post-op pain management ONLY    patient identified, IV checked, site marked, risks and benefits discussed, surgical consent, monitors and equipment checked, pre-op evaluation and timeout performed    Patient Position:  Supine  Prep: ChloraPrep    Monitoring:  Heart rate, continuous pulse ox and cardiac monitor  Block Region:  Lower Extremity  Lower Extremity - Block Type:  Selective FEMORAL nerve block at the Adductor Canal    Laterality:  Left  Procedures: ultrasound guided  Image captured, interpreted and electronically stored.  Local Infiltration:  Lidocaine  Strength:  1 %  Dose:  3 ml  Block Type:  Single-shot  Needle Length:  100mm  Needle Gauge:  21 G  Needle Localization:  Ultrasound guidance  Injection Assessment:  Negative aspiration for heme, no paresthesia on injection, incremental injection and local visualized surrounding nerve on ultrasound  Evidence of intravascular injection: No     US Guided Selective Femoral Nerve Block at Adductor Canal:   US probe placed at mid-thigh level on externally rotated leg and femur identified.  Probe directed medially until Sartorius Muscle (SM), Femoral Artery (FA) and Saphenous Nerve (SN) identified in Adductor Canal (AC).  Needle inserted anterolateral to probe in an in plane approach into a subsartorial perivascular perineural position.  After negative aspiration LA injected with ease and visualized spreading within the AC.

## 2023-02-02 NOTE — THERAPY
Physical Therapy   Initial Evaluation     Patient Name: Raymundo Jackson  Age:  72 y.o., Sex:  male  Medical Record #: [id number]  Today's Date: 2/1/2023     Precautions  Precautions: Weight Bearing As Tolerated Left Lower Extremity    Assessment  Patient is 72 y.o. male s/p left TKA POD #0.  Pt agreeable to therapy evaluation. Pt is able to ambulate safely with FWW, stair training completed. Pt was provided with education on elevation, icing, positioning, and supine/seated therapeutic exercises. HEP handout issued, pt able to return demo all exercises.  Pt has support at home and has no further acute skilled PT needs at this time. Anticipate pt to d/c home once medically clear with recs for FWW use and OP therapy services.    Plan     DC home today, eval only.     DC Equipment Recommendations: None  Discharge Recommendations: Recommend outpatient physical therapy services to address higher level deficits        02/01/23 1028   Prior Living Situation   Housing / Facility 1 Story House   Steps Into Home 2   Steps In Home 0   Equipment Owned Front-Wheel Walker;Raised Toilet Seat Without Arms;Single Point Cane   Lives with - Patient's Self Care Capacity Spouse   Comments supportive spouse   Prior Level of Functional Mobility   Bed Mobility Independent   Transfer Status Independent   Ambulation Independent   Assistive Devices Used None   Stairs Independent   Comments Pt had R TKA 4 mo ago   Cognition    Cognition / Consciousness WDL   Strength Upper Body   Upper Body Strength  WDL   Passive ROM Lower Body   Passive ROM Lower Body X   Comments L knee 0-90 deg   Active ROM Lower Body    Active ROM Lower Body  X   Comments L knee 0-80 deg   Strength Lower Body   Lower Body Strength  X   Comments L knee NT due to pain   Sensation Lower Body   Lower Extremity Sensation   WDL   Balance Assessment   Sitting Balance (Static) Good   Sitting Balance (Dynamic) Fair +   Standing Balance (Static) Fair +   Standing Balance (Dynamic)  Fair   Weight Shift Sitting Good   Weight Shift Standing Fair   Comments stdg with FWW   Bed Mobility    Supine to Sit   (Nt, pt sitting up in recliner)   Gait Analysis   Gait Level Of Assist Supervised   Assistive Device Front Wheel Walker   Distance (Feet) 140   # of Times Distance was Traveled 1   Deviation Decreased Heel Strike;Decreased Toe Off   # of Stairs Climbed 1   Level of Assist with Stairs Contact Guard Assist   Weight Bearing Status WBAT L LE   Functional Mobility   Sit to Stand Supervised   Bed, Chair, Wheelchair Transfer Supervised

## 2023-02-22 ENCOUNTER — APPOINTMENT (OUTPATIENT)
Dept: RADIOLOGY | Facility: MEDICAL CENTER | Age: 73
DRG: 485 | End: 2023-02-22
Attending: EMERGENCY MEDICINE
Payer: MEDICARE

## 2023-02-22 ENCOUNTER — HOSPITAL ENCOUNTER (INPATIENT)
Facility: MEDICAL CENTER | Age: 73
LOS: 7 days | DRG: 485 | End: 2023-03-02
Attending: EMERGENCY MEDICINE | Admitting: INTERNAL MEDICINE
Payer: MEDICARE

## 2023-02-22 ENCOUNTER — ANESTHESIA EVENT (OUTPATIENT)
Dept: SURGERY | Facility: MEDICAL CENTER | Age: 73
DRG: 485 | End: 2023-02-22
Payer: MEDICARE

## 2023-02-22 ENCOUNTER — ANESTHESIA (OUTPATIENT)
Dept: SURGERY | Facility: MEDICAL CENTER | Age: 73
DRG: 485 | End: 2023-02-22
Payer: MEDICARE

## 2023-02-22 DIAGNOSIS — A41.9 SEPSIS, DUE TO UNSPECIFIED ORGANISM, UNSPECIFIED WHETHER ACUTE ORGAN DYSFUNCTION PRESENT (HCC): ICD-10-CM

## 2023-02-22 DIAGNOSIS — T81.40XA POSTOPERATIVE INFECTION, UNSPECIFIED TYPE, INITIAL ENCOUNTER: ICD-10-CM

## 2023-02-22 PROBLEM — Z96.659 INFECTION OF PROSTHETIC KNEE JOINT, INITIAL ENCOUNTER (HCC): Status: ACTIVE | Noted: 2023-02-22

## 2023-02-22 PROBLEM — T84.59XA INFECTION OF PROSTHETIC KNEE JOINT, INITIAL ENCOUNTER (HCC): Status: ACTIVE | Noted: 2023-02-22

## 2023-02-22 PROBLEM — R73.03 PREDIABETES: Status: RESOLVED | Noted: 2022-01-19 | Resolved: 2023-02-22

## 2023-02-22 LAB
ALBUMIN SERPL BCP-MCNC: 3.6 G/DL (ref 3.2–4.9)
ALBUMIN/GLOB SERPL: 0.9 G/DL
ALP SERPL-CCNC: 111 U/L (ref 30–99)
ALT SERPL-CCNC: 22 U/L (ref 2–50)
ANION GAP SERPL CALC-SCNC: 12 MMOL/L (ref 7–16)
APTT PPP: 28.4 SEC (ref 24.7–36)
AST SERPL-CCNC: 21 U/L (ref 12–45)
BASOPHILS # BLD AUTO: 0.2 % (ref 0–1.8)
BASOPHILS # BLD: 0.04 K/UL (ref 0–0.12)
BILIRUB SERPL-MCNC: 1.3 MG/DL (ref 0.1–1.5)
BUN SERPL-MCNC: 21 MG/DL (ref 8–22)
CALCIUM ALBUM COR SERPL-MCNC: 9.6 MG/DL (ref 8.5–10.5)
CALCIUM SERPL-MCNC: 9.3 MG/DL (ref 8.4–10.2)
CHLORIDE SERPL-SCNC: 94 MMOL/L (ref 96–112)
CO2 SERPL-SCNC: 25 MMOL/L (ref 20–33)
CREAT SERPL-MCNC: 1.09 MG/DL (ref 0.5–1.4)
CRP SERPL HS-MCNC: 26.92 MG/DL (ref 0–0.75)
EOSINOPHIL # BLD AUTO: 0.21 K/UL (ref 0–0.51)
EOSINOPHIL NFR BLD: 0.9 % (ref 0–6.9)
ERYTHROCYTE [DISTWIDTH] IN BLOOD BY AUTOMATED COUNT: 45.6 FL (ref 35.9–50)
ERYTHROCYTE [SEDIMENTATION RATE] IN BLOOD BY WESTERGREN METHOD: 94 MM/HOUR (ref 0–20)
GFR SERPLBLD CREATININE-BSD FMLA CKD-EPI: 71 ML/MIN/1.73 M 2
GLOBULIN SER CALC-MCNC: 3.9 G/DL (ref 1.9–3.5)
GLUCOSE SERPL-MCNC: 194 MG/DL (ref 65–99)
HCT VFR BLD AUTO: 42.7 % (ref 42–52)
HGB BLD-MCNC: 14.5 G/DL (ref 14–18)
IMM GRANULOCYTES # BLD AUTO: 0.25 K/UL (ref 0–0.11)
IMM GRANULOCYTES NFR BLD AUTO: 1.1 % (ref 0–0.9)
INR PPP: 1.22 (ref 0.87–1.13)
INR PPP: 1.33 (ref 0.87–1.13)
LACTATE SERPL-SCNC: 1.5 MMOL/L (ref 0.5–2)
LACTATE SERPL-SCNC: 2.3 MMOL/L (ref 0.5–2)
LYMPHOCYTES # BLD AUTO: 0.64 K/UL (ref 1–4.8)
LYMPHOCYTES NFR BLD: 2.7 % (ref 22–41)
MCH RBC QN AUTO: 32.1 PG (ref 27–33)
MCHC RBC AUTO-ENTMCNC: 34 G/DL (ref 33.7–35.3)
MCV RBC AUTO: 94.5 FL (ref 81.4–97.8)
MONOCYTES # BLD AUTO: 1.35 K/UL (ref 0–0.85)
MONOCYTES NFR BLD AUTO: 5.8 % (ref 0–13.4)
NEUTROPHILS # BLD AUTO: 20.79 K/UL (ref 1.82–7.42)
NEUTROPHILS NFR BLD: 89.3 % (ref 44–72)
NRBC # BLD AUTO: 0 K/UL
NRBC BLD-RTO: 0 /100 WBC
PLATELET # BLD AUTO: 313 K/UL (ref 164–446)
PMV BLD AUTO: 10.7 FL (ref 9–12.9)
POTASSIUM SERPL-SCNC: 4.2 MMOL/L (ref 3.6–5.5)
PROCALCITONIN SERPL-MCNC: 3.64 NG/ML
PROT SERPL-MCNC: 7.5 G/DL (ref 6–8.2)
PROTHROMBIN TIME: 15.3 SEC (ref 12–14.6)
PROTHROMBIN TIME: 16.3 SEC (ref 12–14.6)
RBC # BLD AUTO: 4.52 M/UL (ref 4.7–6.1)
SODIUM SERPL-SCNC: 131 MMOL/L (ref 135–145)
WBC # BLD AUTO: 23.3 K/UL (ref 4.8–10.8)

## 2023-02-22 PROCEDURE — 700105 HCHG RX REV CODE 258: Performed by: ORTHOPAEDIC SURGERY

## 2023-02-22 PROCEDURE — 99223 1ST HOSP IP/OBS HIGH 75: CPT | Performed by: INTERNAL MEDICINE

## 2023-02-22 PROCEDURE — 01360 ANES OPEN PX LOWER 1/3 FEMUR: CPT | Performed by: STUDENT IN AN ORGANIZED HEALTH CARE EDUCATION/TRAINING PROGRAM

## 2023-02-22 PROCEDURE — 94760 N-INVAS EAR/PLS OXIMETRY 1: CPT

## 2023-02-22 PROCEDURE — 700111 HCHG RX REV CODE 636 W/ 250 OVERRIDE (IP): Performed by: EMERGENCY MEDICINE

## 2023-02-22 PROCEDURE — 86140 C-REACTIVE PROTEIN: CPT

## 2023-02-22 PROCEDURE — 87075 CULTR BACTERIA EXCEPT BLOOD: CPT | Mod: 91

## 2023-02-22 PROCEDURE — 85610 PROTHROMBIN TIME: CPT

## 2023-02-22 PROCEDURE — 160027 HCHG SURGERY MINUTES - 1ST 30 MINS LEVEL 2: Performed by: ORTHOPAEDIC SURGERY

## 2023-02-22 PROCEDURE — 0QBK0ZZ EXCISION OF LEFT FIBULA, OPEN APPROACH: ICD-10-PCS | Performed by: ORTHOPAEDIC SURGERY

## 2023-02-22 PROCEDURE — 700101 HCHG RX REV CODE 250: Performed by: STUDENT IN AN ORGANIZED HEALTH CARE EDUCATION/TRAINING PROGRAM

## 2023-02-22 PROCEDURE — 87205 SMEAR GRAM STAIN: CPT | Mod: 91

## 2023-02-22 PROCEDURE — 160009 HCHG ANES TIME/MIN: Performed by: ORTHOPAEDIC SURGERY

## 2023-02-22 PROCEDURE — 80053 COMPREHEN METABOLIC PANEL: CPT

## 2023-02-22 PROCEDURE — 700102 HCHG RX REV CODE 250 W/ 637 OVERRIDE(OP): Performed by: STUDENT IN AN ORGANIZED HEALTH CARE EDUCATION/TRAINING PROGRAM

## 2023-02-22 PROCEDURE — 0QBH0ZZ EXCISION OF LEFT TIBIA, OPEN APPROACH: ICD-10-PCS | Performed by: ORTHOPAEDIC SURGERY

## 2023-02-22 PROCEDURE — 160036 HCHG PACU - EA ADDL 30 MINS PHASE I: Performed by: ORTHOPAEDIC SURGERY

## 2023-02-22 PROCEDURE — 700105 HCHG RX REV CODE 258: Performed by: EMERGENCY MEDICINE

## 2023-02-22 PROCEDURE — G0378 HOSPITAL OBSERVATION PER HR: HCPCS

## 2023-02-22 PROCEDURE — 160002 HCHG RECOVERY MINUTES (STAT): Performed by: ORTHOPAEDIC SURGERY

## 2023-02-22 PROCEDURE — 700105 HCHG RX REV CODE 258: Performed by: INTERNAL MEDICINE

## 2023-02-22 PROCEDURE — 700105 HCHG RX REV CODE 258: Performed by: NURSE PRACTITIONER

## 2023-02-22 PROCEDURE — 36415 COLL VENOUS BLD VENIPUNCTURE: CPT

## 2023-02-22 PROCEDURE — 700111 HCHG RX REV CODE 636 W/ 250 OVERRIDE (IP): Performed by: STUDENT IN AN ORGANIZED HEALTH CARE EDUCATION/TRAINING PROGRAM

## 2023-02-22 PROCEDURE — 87070 CULTURE OTHR SPECIMN AEROBIC: CPT | Mod: 91

## 2023-02-22 PROCEDURE — 87015 SPECIMEN INFECT AGNT CONCNTJ: CPT

## 2023-02-22 PROCEDURE — 85652 RBC SED RATE AUTOMATED: CPT

## 2023-02-22 PROCEDURE — 87040 BLOOD CULTURE FOR BACTERIA: CPT

## 2023-02-22 PROCEDURE — 160048 HCHG OR STATISTICAL LEVEL 1-5: Performed by: ORTHOPAEDIC SURGERY

## 2023-02-22 PROCEDURE — 160038 HCHG SURGERY MINUTES - EA ADDL 1 MIN LEVEL 2: Performed by: ORTHOPAEDIC SURGERY

## 2023-02-22 PROCEDURE — 87077 CULTURE AEROBIC IDENTIFY: CPT

## 2023-02-22 PROCEDURE — 160035 HCHG PACU - 1ST 60 MINS PHASE I: Performed by: ORTHOPAEDIC SURGERY

## 2023-02-22 PROCEDURE — 83036 HEMOGLOBIN GLYCOSYLATED A1C: CPT

## 2023-02-22 PROCEDURE — 700101 HCHG RX REV CODE 250: Performed by: EMERGENCY MEDICINE

## 2023-02-22 PROCEDURE — A9270 NON-COVERED ITEM OR SERVICE: HCPCS | Performed by: STUDENT IN AN ORGANIZED HEALTH CARE EDUCATION/TRAINING PROGRAM

## 2023-02-22 PROCEDURE — 87181 SC STD AGAR DILUTION PER AGT: CPT

## 2023-02-22 PROCEDURE — 84145 PROCALCITONIN (PCT): CPT

## 2023-02-22 PROCEDURE — 83605 ASSAY OF LACTIC ACID: CPT

## 2023-02-22 PROCEDURE — 85025 COMPLETE CBC W/AUTO DIFF WBC: CPT

## 2023-02-22 PROCEDURE — 85730 THROMBOPLASTIN TIME PARTIAL: CPT

## 2023-02-22 PROCEDURE — 73560 X-RAY EXAM OF KNEE 1 OR 2: CPT | Mod: LT

## 2023-02-22 PROCEDURE — 99100 ANES PT EXTEME AGE<1 YR&>70: CPT | Performed by: STUDENT IN AN ORGANIZED HEALTH CARE EDUCATION/TRAINING PROGRAM

## 2023-02-22 RX ORDER — OXYCODONE HYDROCHLORIDE AND ACETAMINOPHEN 5; 325 MG/1; MG/1
1 TABLET ORAL EVERY 4 HOURS PRN
Status: DISCONTINUED | OUTPATIENT
Start: 2023-02-22 | End: 2023-02-25

## 2023-02-22 RX ORDER — ENOXAPARIN SODIUM 100 MG/ML
40 INJECTION SUBCUTANEOUS DAILY
Status: DISCONTINUED | OUTPATIENT
Start: 2023-02-22 | End: 2023-02-22

## 2023-02-22 RX ORDER — ONDANSETRON 2 MG/ML
4 INJECTION INTRAMUSCULAR; INTRAVENOUS
Status: DISCONTINUED | OUTPATIENT
Start: 2023-02-22 | End: 2023-02-22 | Stop reason: HOSPADM

## 2023-02-22 RX ORDER — ASPIRIN 325 MG
650 TABLET ORAL EVERY 6 HOURS PRN
COMMUNITY

## 2023-02-22 RX ORDER — HYDROCODONE BITARTRATE AND ACETAMINOPHEN 10; 325 MG/1; MG/1
1 TABLET ORAL ONCE
Status: COMPLETED | OUTPATIENT
Start: 2023-02-22 | End: 2023-02-22

## 2023-02-22 RX ORDER — HYDROMORPHONE HYDROCHLORIDE 1 MG/ML
0.5 INJECTION, SOLUTION INTRAMUSCULAR; INTRAVENOUS; SUBCUTANEOUS
Status: DISCONTINUED | OUTPATIENT
Start: 2023-02-22 | End: 2023-03-02 | Stop reason: HOSPADM

## 2023-02-22 RX ORDER — SODIUM CHLORIDE, SODIUM LACTATE, POTASSIUM CHLORIDE, CALCIUM CHLORIDE 600; 310; 30; 20 MG/100ML; MG/100ML; MG/100ML; MG/100ML
INJECTION, SOLUTION INTRAVENOUS CONTINUOUS
Status: DISCONTINUED | OUTPATIENT
Start: 2023-02-22 | End: 2023-02-22 | Stop reason: HOSPADM

## 2023-02-22 RX ORDER — ASPIRIN 325 MG
650 TABLET ORAL EVERY 6 HOURS PRN
Status: DISCONTINUED | OUTPATIENT
Start: 2023-02-22 | End: 2023-03-02 | Stop reason: HOSPADM

## 2023-02-22 RX ORDER — HYDROMORPHONE HYDROCHLORIDE 1 MG/ML
0.1 INJECTION, SOLUTION INTRAMUSCULAR; INTRAVENOUS; SUBCUTANEOUS
Status: DISCONTINUED | OUTPATIENT
Start: 2023-02-22 | End: 2023-02-22 | Stop reason: HOSPADM

## 2023-02-22 RX ORDER — LIDOCAINE HYDROCHLORIDE 20 MG/ML
INJECTION, SOLUTION EPIDURAL; INFILTRATION; INTRACAUDAL; PERINEURAL PRN
Status: DISCONTINUED | OUTPATIENT
Start: 2023-02-22 | End: 2023-02-22 | Stop reason: SURG

## 2023-02-22 RX ORDER — TRANEXAMIC ACID 100 MG/ML
INJECTION, SOLUTION INTRAVENOUS PRN
Status: DISCONTINUED | OUTPATIENT
Start: 2023-02-22 | End: 2023-02-22 | Stop reason: SURG

## 2023-02-22 RX ORDER — OXYCODONE HCL 5 MG/5 ML
10 SOLUTION, ORAL ORAL
Status: DISCONTINUED | OUTPATIENT
Start: 2023-02-22 | End: 2023-02-22 | Stop reason: HOSPADM

## 2023-02-22 RX ORDER — SODIUM CHLORIDE, SODIUM LACTATE, POTASSIUM CHLORIDE, CALCIUM CHLORIDE 600; 310; 30; 20 MG/100ML; MG/100ML; MG/100ML; MG/100ML
INJECTION, SOLUTION INTRAVENOUS CONTINUOUS
Status: DISCONTINUED | OUTPATIENT
Start: 2023-02-22 | End: 2023-02-22

## 2023-02-22 RX ORDER — LABETALOL HYDROCHLORIDE 5 MG/ML
5 INJECTION, SOLUTION INTRAVENOUS
Status: DISCONTINUED | OUTPATIENT
Start: 2023-02-22 | End: 2023-02-22 | Stop reason: HOSPADM

## 2023-02-22 RX ORDER — SIMVASTATIN 10 MG
10 TABLET ORAL DAILY
Status: DISCONTINUED | OUTPATIENT
Start: 2023-02-23 | End: 2023-03-02 | Stop reason: HOSPADM

## 2023-02-22 RX ORDER — SODIUM CHLORIDE, SODIUM LACTATE, POTASSIUM CHLORIDE, AND CALCIUM CHLORIDE .6; .31; .03; .02 G/100ML; G/100ML; G/100ML; G/100ML
30 INJECTION, SOLUTION INTRAVENOUS ONCE
Status: COMPLETED | OUTPATIENT
Start: 2023-02-22 | End: 2023-02-22

## 2023-02-22 RX ORDER — EPHEDRINE SULFATE 50 MG/ML
INJECTION, SOLUTION INTRAVENOUS PRN
Status: DISCONTINUED | OUTPATIENT
Start: 2023-02-22 | End: 2023-02-22 | Stop reason: SURG

## 2023-02-22 RX ORDER — HYDROMORPHONE HYDROCHLORIDE 1 MG/ML
0.4 INJECTION, SOLUTION INTRAMUSCULAR; INTRAVENOUS; SUBCUTANEOUS
Status: DISCONTINUED | OUTPATIENT
Start: 2023-02-22 | End: 2023-02-22 | Stop reason: HOSPADM

## 2023-02-22 RX ORDER — HYDROMORPHONE HYDROCHLORIDE 1 MG/ML
0.2 INJECTION, SOLUTION INTRAMUSCULAR; INTRAVENOUS; SUBCUTANEOUS
Status: DISCONTINUED | OUTPATIENT
Start: 2023-02-22 | End: 2023-02-22 | Stop reason: HOSPADM

## 2023-02-22 RX ORDER — DIPHENHYDRAMINE HYDROCHLORIDE 50 MG/ML
12.5 INJECTION INTRAMUSCULAR; INTRAVENOUS
Status: DISCONTINUED | OUTPATIENT
Start: 2023-02-22 | End: 2023-02-22 | Stop reason: HOSPADM

## 2023-02-22 RX ORDER — SODIUM CHLORIDE 9 MG/ML
INJECTION, SOLUTION INTRAVENOUS CONTINUOUS
Status: DISCONTINUED | OUTPATIENT
Start: 2023-02-22 | End: 2023-02-26

## 2023-02-22 RX ORDER — HALOPERIDOL 5 MG/ML
1 INJECTION INTRAMUSCULAR
Status: DISCONTINUED | OUTPATIENT
Start: 2023-02-22 | End: 2023-02-22 | Stop reason: HOSPADM

## 2023-02-22 RX ORDER — MORPHINE SULFATE 4 MG/ML
4 INJECTION INTRAVENOUS ONCE
Status: COMPLETED | OUTPATIENT
Start: 2023-02-22 | End: 2023-02-22

## 2023-02-22 RX ORDER — HYDRALAZINE HYDROCHLORIDE 20 MG/ML
5 INJECTION INTRAMUSCULAR; INTRAVENOUS
Status: DISCONTINUED | OUTPATIENT
Start: 2023-02-22 | End: 2023-02-22 | Stop reason: HOSPADM

## 2023-02-22 RX ORDER — ACETAMINOPHEN 500 MG
1000 TABLET ORAL EVERY 6 HOURS PRN
Status: DISCONTINUED | OUTPATIENT
Start: 2023-02-22 | End: 2023-02-23

## 2023-02-22 RX ORDER — KETOROLAC TROMETHAMINE 30 MG/ML
30 INJECTION, SOLUTION INTRAMUSCULAR; INTRAVENOUS ONCE
Status: COMPLETED | OUTPATIENT
Start: 2023-02-22 | End: 2023-02-22

## 2023-02-22 RX ORDER — SODIUM CHLORIDE, SODIUM LACTATE, POTASSIUM CHLORIDE, AND CALCIUM CHLORIDE .6; .31; .03; .02 G/100ML; G/100ML; G/100ML; G/100ML
1000 INJECTION, SOLUTION INTRAVENOUS
Status: DISCONTINUED | OUTPATIENT
Start: 2023-02-22 | End: 2023-03-02 | Stop reason: HOSPADM

## 2023-02-22 RX ORDER — ACETAMINOPHEN 500 MG
1000 TABLET ORAL EVERY 4 HOURS PRN
Status: COMPLETED | OUTPATIENT
Start: 2023-02-22 | End: 2023-02-22

## 2023-02-22 RX ORDER — OXYCODONE HCL 5 MG/5 ML
5 SOLUTION, ORAL ORAL
Status: DISCONTINUED | OUTPATIENT
Start: 2023-02-22 | End: 2023-02-22 | Stop reason: HOSPADM

## 2023-02-22 RX ORDER — SODIUM CHLORIDE 9 MG/ML
INJECTION, SOLUTION INTRAVENOUS CONTINUOUS
Status: DISCONTINUED | OUTPATIENT
Start: 2023-02-22 | End: 2023-02-22

## 2023-02-22 RX ADMIN — SODIUM CHLORIDE: 9 INJECTION, SOLUTION INTRAVENOUS at 22:33

## 2023-02-22 RX ADMIN — FENTANYL CITRATE 50 MCG: 50 INJECTION, SOLUTION INTRAMUSCULAR; INTRAVENOUS at 14:02

## 2023-02-22 RX ADMIN — KETOROLAC TROMETHAMINE 30 MG: 30 INJECTION, SOLUTION INTRAMUSCULAR at 09:56

## 2023-02-22 RX ADMIN — SODIUM CHLORIDE, POTASSIUM CHLORIDE, SODIUM LACTATE AND CALCIUM CHLORIDE: 600; 310; 30; 20 INJECTION, SOLUTION INTRAVENOUS at 14:30

## 2023-02-22 RX ADMIN — SODIUM CHLORIDE, POTASSIUM CHLORIDE, SODIUM LACTATE AND CALCIUM CHLORIDE 2700 ML: 600; 310; 30; 20 INJECTION, SOLUTION INTRAVENOUS at 09:54

## 2023-02-22 RX ADMIN — HYDROCODONE BITARTRATE AND ACETAMINOPHEN 1 TABLET: 10; 325 TABLET ORAL at 16:44

## 2023-02-22 RX ADMIN — EPHEDRINE SULFATE 5 MG: 50 INJECTION, SOLUTION INTRAVENOUS at 14:02

## 2023-02-22 RX ADMIN — PROPOFOL 150 MG: 10 INJECTION, EMULSION INTRAVENOUS at 14:02

## 2023-02-22 RX ADMIN — SODIUM CHLORIDE, POTASSIUM CHLORIDE, SODIUM LACTATE AND CALCIUM CHLORIDE 2700 ML: 600; 310; 30; 20 INJECTION, SOLUTION INTRAVENOUS at 13:25

## 2023-02-22 RX ADMIN — FENTANYL CITRATE 50 MCG: 50 INJECTION, SOLUTION INTRAMUSCULAR; INTRAVENOUS at 14:23

## 2023-02-22 RX ADMIN — SODIUM CHLORIDE, POTASSIUM CHLORIDE, SODIUM LACTATE AND CALCIUM CHLORIDE 2712 ML: 600; 310; 30; 20 INJECTION, SOLUTION INTRAVENOUS at 19:31

## 2023-02-22 RX ADMIN — FENTANYL CITRATE 50 MCG: 50 INJECTION, SOLUTION INTRAMUSCULAR; INTRAVENOUS at 13:57

## 2023-02-22 RX ADMIN — AMPICILLIN AND SULBACTAM 3 G: 1; 2 INJECTION, POWDER, FOR SOLUTION INTRAMUSCULAR; INTRAVENOUS at 10:07

## 2023-02-22 RX ADMIN — MORPHINE SULFATE 4 MG: 4 INJECTION INTRAVENOUS at 09:28

## 2023-02-22 RX ADMIN — FENTANYL CITRATE 50 MCG: 50 INJECTION, SOLUTION INTRAMUSCULAR; INTRAVENOUS at 14:21

## 2023-02-22 RX ADMIN — TRANEXAMIC ACID 1000 MG: 100 INJECTION, SOLUTION INTRAVENOUS at 14:10

## 2023-02-22 RX ADMIN — VANCOMYCIN HYDROCHLORIDE 2250 MG: 1 INJECTION, POWDER, LYOPHILIZED, FOR SOLUTION INTRAVENOUS at 10:40

## 2023-02-22 RX ADMIN — FENTANYL CITRATE 25 MCG: 50 INJECTION, SOLUTION INTRAMUSCULAR; INTRAVENOUS at 15:55

## 2023-02-22 RX ADMIN — ACETAMINOPHEN 1000 MG: 500 TABLET ORAL at 16:45

## 2023-02-22 RX ADMIN — SODIUM CHLORIDE, POTASSIUM CHLORIDE, SODIUM LACTATE AND CALCIUM CHLORIDE: 600; 310; 30; 20 INJECTION, SOLUTION INTRAVENOUS at 13:57

## 2023-02-22 RX ADMIN — LIDOCAINE HYDROCHLORIDE 100 MG: 20 INJECTION, SOLUTION EPIDURAL; INFILTRATION; INTRACAUDAL at 14:02

## 2023-02-22 ASSESSMENT — COGNITIVE AND FUNCTIONAL STATUS - GENERAL
SUGGESTED CMS G CODE MODIFIER DAILY ACTIVITY: CJ
TURNING FROM BACK TO SIDE WHILE IN FLAT BAD: A LITTLE
STANDING UP FROM CHAIR USING ARMS: A LITTLE
DAILY ACTIVITIY SCORE: 22
HELP NEEDED FOR BATHING: A LITTLE
DRESSING REGULAR LOWER BODY CLOTHING: A LITTLE
MOBILITY SCORE: 16
MOVING FROM LYING ON BACK TO SITTING ON SIDE OF FLAT BED: A LITTLE
WALKING IN HOSPITAL ROOM: A LOT
SUGGESTED CMS G CODE MODIFIER MOBILITY: CK
CLIMB 3 TO 5 STEPS WITH RAILING: A LOT
MOVING TO AND FROM BED TO CHAIR: A LITTLE

## 2023-02-22 ASSESSMENT — LIFESTYLE VARIABLES
EVER FELT BAD OR GUILTY ABOUT YOUR DRINKING: NO
AVERAGE NUMBER OF DAYS PER WEEK YOU HAVE A DRINK CONTAINING ALCOHOL: 0
CONSUMPTION TOTAL: NEGATIVE
HOW MANY TIMES IN THE PAST YEAR HAVE YOU HAD 5 OR MORE DRINKS IN A DAY: 0
HAVE YOU EVER FELT YOU SHOULD CUT DOWN ON YOUR DRINKING: NO
TOTAL SCORE: 0
HAVE PEOPLE ANNOYED YOU BY CRITICIZING YOUR DRINKING: NO
TOTAL SCORE: 0
TOTAL SCORE: 0
EVER HAD A DRINK FIRST THING IN THE MORNING TO STEADY YOUR NERVES TO GET RID OF A HANGOVER: NO
ALCOHOL_USE: NO
ON A TYPICAL DAY WHEN YOU DRINK ALCOHOL HOW MANY DRINKS DO YOU HAVE: 0

## 2023-02-22 ASSESSMENT — ENCOUNTER SYMPTOMS
NEUROLOGICAL NEGATIVE: 1
FEVER: 1
EYES NEGATIVE: 1
GASTROINTESTINAL NEGATIVE: 1
PSYCHIATRIC NEGATIVE: 1
CARDIOVASCULAR NEGATIVE: 1
RESPIRATORY NEGATIVE: 1

## 2023-02-22 ASSESSMENT — PAIN DESCRIPTION - PAIN TYPE
TYPE: ACUTE PAIN
TYPE: SURGICAL PAIN

## 2023-02-22 ASSESSMENT — PATIENT HEALTH QUESTIONNAIRE - PHQ9
SUM OF ALL RESPONSES TO PHQ9 QUESTIONS 1 AND 2: 0
1. LITTLE INTEREST OR PLEASURE IN DOING THINGS: NOT AT ALL
2. FEELING DOWN, DEPRESSED, IRRITABLE, OR HOPELESS: NOT AT ALL

## 2023-02-22 ASSESSMENT — FIBROSIS 4 INDEX
FIB4 SCORE: 1.04
FIB4 SCORE: 1.04
FIB4 SCORE: 1.72

## 2023-02-22 NOTE — ED NOTES
Med rec updated and complete, per pt   Allergies reviewed, per pt  Interviewed pt with wife at bedside with permission from pt.

## 2023-02-22 NOTE — ANESTHESIA PROCEDURE NOTES
Airway    Date/Time: 2/22/2023 2:04 PM  Performed by: Brandon Lee D.O.  Authorized by: Brandon Lee D.O.     Location:  OR  Urgency:  Elective  Indications for Airway Management:  Anesthesia      Spontaneous Ventilation: absent    Sedation Level:  Deep  Preoxygenated: Yes    Final Airway Type:  Supraglottic airway  Final Supraglottic Airway:  Standard LMA    SGA Size:  5  Number of Attempts at Approach:  1

## 2023-02-22 NOTE — ANESTHESIA POSTPROCEDURE EVALUATION
Patient: Raymundo Jackson    Procedure Summary     Date: 02/22/23 Room / Location:  OR 03 / SURGERY [facility]    Anesthesia Start: 1357 Anesthesia Stop: 1512    Procedure: IRRIGATION AND DEBRIDEMENT, WOUND (Left: Knee) Diagnosis: (infected left TKA)    Surgeons: Brodie Del Valle M.D. Responsible Provider: Brandon Lee D.O.    Anesthesia Type: general ASA Status: 2          Final Anesthesia Type: general  Last vitals  BP   Blood Pressure : 129/62    Temp   (!) 40.3 °C (104.5 °F)    Pulse   100   Resp   (!) 22    SpO2   99 %      Anesthesia Post Evaluation    Patient location during evaluation: PACU  Patient participation: complete - patient participated  Level of consciousness: awake and alert    Airway patency: patent  Anesthetic complications: no  Cardiovascular status: hemodynamically stable  Respiratory status: acceptable  Hydration status: euvolemic    PONV: none          There were no known notable events for this encounter.     Nurse Pain Score: 0 (NPRS)

## 2023-02-22 NOTE — OR SURGEON
Immediate Post OP Note    PreOp Diagnosis: probable infected left TKA      PostOp Diagnosis: infected left TKA      Procedure(s):  IRRIGATION AND DEBRIDEMENT, infected total knee arthroplasty    Surgeon(s):  Brodie Del Valle M.D.    Anesthesiologist/Type of Anesthesia:  Anesthesiologist: Brandon Lee D.O./General    Surgical Staff:  Circulator: Jen Parra R.N.; Crystal Flood R.N.  Limb Danielson: Cornel Causey  Scrub Person: Jamel Timmons    Specimens removed if any:  ID Type Source Tests Collected by Time Destination   1 : left knee deep Body Fluid Knee AEROBIC/ANAEROBIC CULTURE (SURGERY) Brodie Del Valle M.D. 2/22/2023  2:17 PM    2 : left knee deep Body Fluid Knee AEROBIC/ANAEROBIC CULTURE (SURGERY) Brodie Del Valle M.D. 2/22/2023  2:26 PM    3 : left knee deep Tissue Knee AEROBIC/ANAEROBIC CULTURE (SURGERY) Brodie Del Valle M.D. 2/22/2023  2:28 PM        Estimated Blood Loss: 250cc    Findings: copious amounts purulent matierial    Complications: none        2/22/2023 3:12 PM Brodie Del Valle M.D.

## 2023-02-22 NOTE — ED NOTES
Pt dozing w/out distress   IV fluids infusing well  waiting to be taken to surgery today at 1400

## 2023-02-22 NOTE — ANESTHESIA TIME REPORT
Anesthesia Start and Stop Event Times     Date Time Event    2/22/2023 1348 Ready for Procedure     1357 Anesthesia Start     1512 Anesthesia Stop        Responsible Staff  02/22/23    Name Role Begin End    Brandon Lee D.O. Anesth 1357 1512        Overtime Reason:  overtime    Comments:

## 2023-02-22 NOTE — DISCHARGE PLANNING
ER CM met with pt and wife at bedside. AO x4. Reyna, Lives in [address] home. Had been independent in ambulation since surgery. He had been doing 4-5 miles  a day on the bike. He notes Sunday the surgery site started draining and he did shower Tuesday. He did advised MD Rajan Tuesday because office closed for weekend and holiday prior.  He has polar ice machine but uses a ice bottle instead. He notes that he does have lesion on his right hand that started after surgery and was infected that he was getting pus out on the second finger near second joint and urine Is orange despite hydrating.  RX [facility] [address] PCP DR Simpson. Shower bench fitting in shower  Care Transition Team Assessment    Information Source  Orientation Level: Oriented X4  Information Given By: Patient  Informant's Name: Raymundo  Who is responsible for making decisions for patient? : Patient    Readmission Evaluation  Is this a readmission?: Yes - unplanned readmission  Why do you think you were readmitted?: worsening  Was an appointment arranged for you prior to discharge?: Yes, attended appointment  Were there new prescriptions you were supposed to fill after you were discharged?: Yes, prescriptions filled  Did you understand your discharge instructions?: Yes  Did you have enough support after your last discharge?: Yes    Elopement Risk  Legal Hold: No  Ambulatory or Self Mobile in Wheelchair: No-Not an Elopement Risk    Interdisciplinary Discharge Planning  Primary Care Physician: Dr Simpson  Lives with - Patient's Self Care Capacity: Spouse  Support Systems: Spouse / Significant Other  Housing / Facility: [address]  Do You Take your Prescribed Medications Regularly: Yes  Mobility Issues: Yes  Assistance Needed: Yes  Durable Medical Equipment: Other - Specify (polar ice Shower chair)    Discharge Preparedness  What is your plan after discharge?: Home with help  What are your discharge supports?: Spouse  Prior Functional Level:  Ambulatory    Functional Assesment  Prior Functional Level: Ambulatory    Finances  Prescription Coverage: Yes ([facility])                   Domestic Abuse  Have you ever been the victim of abuse or violence?: No    Psychological Assessment  History of Substance Abuse: None         Anticipated Discharge Information  Discharge Disposition: Discharged to home/self care (01)

## 2023-02-22 NOTE — OR NURSING
1510 - Pt arrived from OR, report received from RN/anesthesia, oral airway in place, VSS  L knee ace wrap in place - CDI, Drain in place, small amount of bloody drainage present.  LLE red, swollen, +1 pedal pulse     1517 - Pt arousable, Oral airway removed, VSS    1530 - Pt arousable, no pain, no nausea, VSS    1545 - Pt resting comfortably, VSS, no pain/nausea    1600 - Pt complaining of pain 4/10, medication given, no nausea, VSS    1615 - Pt resting comfortably, reports pain still 4/10 but better/tolerable, no nausea, VSS  Report to Allen JIMENEZ

## 2023-02-22 NOTE — ED TRIAGE NOTES
Pt to triage via w/c w spouse c/o post-op complications to L knee r/t L knee surgery 2-1-23. Pt states increased swelling, redness and purulent drainage since Sunday, progressively worsening.

## 2023-02-22 NOTE — PROGRESS NOTES
Patient well known to me.  3 weeks s/p lt TKA.  Pain and drainage over last 24 hours.      WBC 25K    Knee swollen and painful    Probable infected left TKA  To or as soon as available

## 2023-02-22 NOTE — ED PROVIDER NOTES
ED Provider Note    CHIEF COMPLAINT  Chief Complaint   Patient presents with    Knee Pain       EXTERNAL RECORDS REVIEWED  Inpatient Notes I reviewed the patient's inpatient note, he had a total knee replacement on the left side done in February 1, 2023 by Dr. Rafal HENSLEY/ALFRED  LIMITATION TO HISTORY   Select: : None  OUTSIDE HISTORIAN(S):  Significant other the patient's wife is in the room with him helping with the history.    Raymundo Jackson is a 72 y.o. male who presents with past medical history significant for hepatitis C, high cholesterol, diabetes, the patient had a left total knee replacement done February 1, 2023.  He reports he was doing well until Sunday he started having severe pain and drainage.  He has an appointment on Thursday but said the pain is so severe he cannot wait.  He denies any fever.  He denies any chills.    PAST MEDICAL HISTORY   has a past medical history of Arthritis, Awareness under anesthesia, Dental disorder, Diabetes (HCC), Disorder of thyroid, Hemorrhagic disorder (HCC), Hepatitis C, High cholesterol, and Pain.    SURGICAL HISTORY   has a past surgical history that includes total knee arthroplasty (Right, 09/07/2022); knee arthroscopy (Right); knee arthroscopy (Right); tonsillectomy; and total knee arthroplasty (Left, 2/1/2023).    FAMILY HISTORY  No family history on file.    SOCIAL HISTORY  Social History     Tobacco Use    Smoking status: Never    Smokeless tobacco: Never   Vaping Use    Vaping Use: Never used   Substance and Sexual Activity    Alcohol use: Never    Drug use: Never    Sexual activity: Not on file       CURRENT MEDICATIONS    HYDROcodone-acetaminophen (NORCO) 5-325 MG Tab per tablet     Sig: Take 1 Tablet by mouth every 6 hours as needed.   Class: Historical Med   Route: Oral   traMADol (ULTRAM) 50 MG Tab     Sig: Take 50 mg by mouth every 6 hours as needed.   Class: Historical Med   Route: Oral   calcium carbonate/glycine (TITRALAC) 420 MG Chew Tab      Sig: Chew 1 Tablet 2 times a day.   Class: Historical Med   Route: Oral   vitamin D3 (CHOLECALCIFEROL) 1000 Unit (25 mcg) Tab     Sig: Take 5,000 Units by mouth every day.   Class: Historical Med   Route: Oral   Number of times this order has been changed since signin     Order Audit [place]     vitamin e (VITAMIN E) 400 UNIT Cap     Sig: Take 400 Units by mouth every day.   Class: Historical Med   Route: Oral   Number of times this order has been changed since signin     Order Audit [place]     Ascorbic Acid (VITAMIN C) 1000 MG Tab     Sig: Take  by mouth.   Class: Historical Med   Route: Oral   Number of times this order has been changed since signin     Order Audit [place]     ferrous sulfate 325 (65 Fe) MG tablet     Sig: Take 325 mg by mouth every day.   Class: Historical Med   Route: Oral   Number of times this order has been changed since signin     Order Audit [place]     acetaminophen (TYLENOL) 325 MG Tab     Sig: Take 650 mg by mouth every four hours as needed.   Class: Historical Med   Route: Oral   Number of times this order has been changed since signin     Order Audit [place]     metFORMIN (GLUCOPHAGE) 500 MG Tab     Sig: Take 500 mg by mouth every day. In AM   Class: Historical Med   Route: Oral   Number of times this order has been changed since signin     Order Audit [place]     simvastatin (ZOCOR) 10 MG Tab     Sig: Take 10 mg by mouth every day. In AM   Class: Historical Med   Route: Oral   Number of times this order has been changed since signin     Order Audit [place]           ALLERGIES  Allergies   Allergen Reactions    Oxycodone Unspecified     Sees double and triple vision        PHYSICAL EXAM  VITAL SIGNS: /66   Pulse 91   Temp 37.9 °C (100.3 °F)   Resp 16   Ht 1.829 m (6')   Wt 90 kg (198 lb 6.6 oz)   SpO2 94%   BMI 26.91 kg/m²    Constitutional: Alert.  HENT: No signs of trauma, Bilateral external ears normal, Nose normal.   Eyes: Pupils are equal and  reactive, Conjunctiva normal, Non-icteric.   Neck: Normal range of motion, No tenderness, Supple, No stridor.   Lymphatic: No lymphadenopathy noted.   Cardiovascular: Regular rate and rhythm, no murmurs.   Thorax & Lungs: Normal breath sounds, No respiratory distress, No wheezing, No chest tenderness.   Abdomen: Bowel sounds normal, Soft, No tenderness, No peritoneal signs, No masses, No pulsatile masses.   Skin: Warm, Dry, No erythema, No rash.   Back: No bony tenderness, No CVA tenderness.   Extremities: Intact distal pulses, welling of the left knee.  Evidence of yellow drainage.  Musculoskeletal: Good range of motion in all major joints. No tenderness to palpation or major deformities noted.   Neurologic: Alert , Normal motor function, Normal sensory function, No focal deficits noted.   Psychiatric: Affect normal, Judgment normal, Mood normal.       DIAGNOSTIC STUDIES / PROCEDURES    LABS  Labs Reviewed   CBC WITH DIFFERENTIAL - Abnormal; Notable for the following components:       Result Value    WBC 23.3 (*)     RBC 4.52 (*)     Neutrophils-Polys 89.30 (*)     Lymphocytes 2.70 (*)     Immature Granulocytes 1.10 (*)     Neutrophils (Absolute) 20.79 (*)     Lymphs (Absolute) 0.64 (*)     Monos (Absolute) 1.35 (*)     Immature Granulocytes (abs) 0.25 (*)     All other components within normal limits    Narrative:     Indicate which anticoagulants the patient is on:->NONE   COMP METABOLIC PANEL - Abnormal; Notable for the following components:    Sodium 131 (*)     Chloride 94 (*)     Glucose 194 (*)     Alkaline Phosphatase 111 (*)     Globulin 3.9 (*)     All other components within normal limits    Narrative:     Indicate which anticoagulants the patient is on:->NONE   LACTIC ACID - Abnormal; Notable for the following components:    Lactic Acid 2.3 (*)     All other components within normal limits    Narrative:     Indicate which anticoagulants the patient is on:->NONE   SED RATE - Abnormal; Notable for the  "following components:    Sed Rate Westergren 94 (*)     All other components within normal limits    Narrative:     Indicate which anticoagulants the patient is on:->NONE   CRP QUANTITIVE (NON-CARDIAC) - Abnormal; Notable for the following components:    Stat C-Reactive Protein 26.92 (*)     All other components within normal limits    Narrative:     Indicate which anticoagulants the patient is on:->NONE   PROTHROMBIN TIME - Abnormal; Notable for the following components:    PT 15.3 (*)     INR 1.22 (*)     All other components within normal limits    Narrative:     Indicate which anticoagulants the patient is on:->NONE   APTT    Narrative:     Indicate which anticoagulants the patient is on:->NONE   CORRECTED CALCIUM    Narrative:     Indicate which anticoagulants the patient is on:->NONE   ESTIMATED GFR    Narrative:     Indicate which anticoagulants the patient is on:->NONE   BLOOD CULTURE    Narrative:     1 of 2 for Blood Culture x 2 sites order. Per Hospital  Policy: Only change Specimen Src: to \"Line\" if specified by  physician order.   BLOOD CULTURE    Narrative:     2 of 2 blood culture x2  Sites order. Per Hospital Policy:  Only change Specimen Src: to \"Line\" if specified by physician  order.   CULTURE WOUND W/ GRAM STAIN         RADIOLOGY  I have independently interpreted the diagnostic imaging associated with this visit and am waiting the final reading from the radiologist.   My preliminary interpretation is a follows: Evidence of total knee replacement with no fracture  Radiologist interpretation:     DX-KNEE 2- LEFT   Final Result         1. Moderate joint effusion, new since prior.              COURSE & MEDICAL DECISION MAKING    ED Observation Status? Yes; I am placing the patient in to an observation status due to a diagnostic uncertainty as well as therapeutic intensity. Patient placed in observation status at 9:19 AM, 2/22/2023.     Observation plan is as follows: I have paged Dr. Lyles to " discuss, initially have ordered an ultrasound of the left lower extremity, x-ray, labs, differential diagnose includes infection, sepsis.  The patient is given 4 mg IV morphine.    I was informed by the nurse that the patient's temperature is now 105.  I have canceled the ultrasound, this patient most likely has infected knee.    9:54 AM patient's white blood count is 23,000.  I will order antibiotics.      Upon Reevaluation, the patient's condition has: not improved; and will be escalated to hospitalization.    Patient discharged from ED Observation status at .now (Time) February 22nd 2023 (Date).     INITIAL ASSESSMENT, COURSE AND PLAN  Care Narrative: X-ray has been ordered, ultrasound, labs, 4 mg IV morphine.    The patient's fever has prompted me to order a 30 cc/kg IV LR bolus for sepsis.            ADDITIONAL PROBLEM LIST  Arthritis, diabetes, hepatitis C, sepsis  DISPOSITION AND DISCUSSIONS  I have discussed management of the patient with the following physicians and PRAFUL's: I spoke with Dr. Lyles who will take the patient to the operating room.    Discussion of management with other [facility] or appropriate source(s): None         Barriers to care at this time, including but not limited to:  None .     Decision tools and prescription drugs considered including, but not limited to: D-dimer D-dimer was not used because the patient has a high pretest probability postsurgery.  Ultrasound was ordered. .    The total critical care time on this patient is 40 minutes, resuscitating patient, speaking with admitting physician, and deciphering test results. This 40 minutes is exclusive of separately billable procedures.      FINAL DIAGNOSIS  1. Sepsis, due to unspecified organism, unspecified whether acute organ dysfunction present (HCC)    2. Postoperative infection, unspecified type, initial encounter      Total critical care time 40 minutes as outlined above    Electronically signed by: Jordan Hope M.D.,  2/22/2023 9:14 AM

## 2023-02-22 NOTE — ANESTHESIA PREPROCEDURE EVALUATION
Case: [id number] Date/Time: 02/22/23 1345    Procedure: IRRIGATION AND DEBRIDEMENT, WOUND (Left: Knee)    Location:  OR 03 [facility]    Surgeons: Brodie Del Valle M.D.          Relevant Problems   No relevant active problems       Physical Exam    Airway   Mallampati: II  TM distance: >3 FB  Neck ROM: full       Cardiovascular - normal exam  Rhythm: regular  Rate: normal  (-) murmur     Dental - normal exam           Pulmonary - normal exam  Breath sounds clear to auscultation     Abdominal    Neurological - normal exam                 Anesthesia Plan    ASA 2       Plan - general       Airway plan will be LMA          Induction: intravenous    Postoperative Plan: Postoperative administration of opioids is intended.    Pertinent diagnostic labs and testing reviewed    Informed Consent:    Anesthetic plan and risks discussed with patient.    Use of blood products discussed with: patient whom consented to blood products.

## 2023-02-22 NOTE — OR NURSING
1315: Pt brought to pre-op holding via bed by transport. Pain is high, pt is shivering. Wife at bedside. Pt hooked to the Bear-hugger and more warm blankets were provided. Pictures taken of his L knee, L ankle, and R middle finger. Pt is awake and alert, A&O x 4, tolerating room air. Antibiotics are running and LR bolus in being continued for sepsis, MDs aware.  1355: Patient allergies and NPO status verified, home medication reconciliation completed and belongings secured. Patient verbalizes understanding of pain scale, expected course of stay and plan of care. Surgical site verified with patient. PIVs patency verified. Sequentials placed on legs.

## 2023-02-23 ENCOUNTER — APPOINTMENT (OUTPATIENT)
Dept: RADIOLOGY | Facility: MEDICAL CENTER | Age: 73
DRG: 485 | End: 2023-02-23
Attending: NURSE PRACTITIONER
Payer: MEDICARE

## 2023-02-23 PROBLEM — B95.5 STREPTOCOCCAL BACTEREMIA: Status: ACTIVE | Noted: 2023-02-23

## 2023-02-23 PROBLEM — S61.209A FINGER WOUND, SIMPLE, OPEN: Status: ACTIVE | Noted: 2023-02-23

## 2023-02-23 PROBLEM — R78.81 STREPTOCOCCAL BACTEREMIA: Status: ACTIVE | Noted: 2023-02-23

## 2023-02-23 PROBLEM — M00.9 SEPTIC ARTHRITIS (HCC): Status: ACTIVE | Noted: 2023-02-23

## 2023-02-23 LAB
ANION GAP SERPL CALC-SCNC: 9 MMOL/L (ref 7–16)
BUN SERPL-MCNC: 20 MG/DL (ref 8–22)
CALCIUM SERPL-MCNC: 8.3 MG/DL (ref 8.4–10.2)
CHLORIDE SERPL-SCNC: 100 MMOL/L (ref 96–112)
CO2 SERPL-SCNC: 25 MMOL/L (ref 20–33)
CREAT SERPL-MCNC: 0.93 MG/DL (ref 0.5–1.4)
ERYTHROCYTE [DISTWIDTH] IN BLOOD BY AUTOMATED COUNT: 48.5 FL (ref 35.9–50)
EST. AVERAGE GLUCOSE BLD GHB EST-MCNC: 134 MG/DL
GFR SERPLBLD CREATININE-BSD FMLA CKD-EPI: 86 ML/MIN/1.73 M 2
GLUCOSE SERPL-MCNC: 180 MG/DL (ref 65–99)
GRAM STN SPEC: NORMAL
HBA1C MFR BLD: 6.3 % (ref 4–5.6)
HCT VFR BLD AUTO: 36 % (ref 42–52)
HGB BLD-MCNC: 11.7 G/DL (ref 14–18)
LACTATE SERPL-SCNC: 2.1 MMOL/L (ref 0.5–2)
LACTATE SERPL-SCNC: 2.7 MMOL/L (ref 0.5–2)
LACTATE SERPL-SCNC: 4.1 MMOL/L (ref 0.5–2)
MCH RBC QN AUTO: 31.9 PG (ref 27–33)
MCHC RBC AUTO-ENTMCNC: 32.5 G/DL (ref 33.7–35.3)
MCV RBC AUTO: 98.1 FL (ref 81.4–97.8)
PLATELET # BLD AUTO: 231 K/UL (ref 164–446)
PMV BLD AUTO: 10.9 FL (ref 9–12.9)
POTASSIUM SERPL-SCNC: 3.7 MMOL/L (ref 3.6–5.5)
RBC # BLD AUTO: 3.67 M/UL (ref 4.7–6.1)
SIGNIFICANT IND 70042: NORMAL
SITE SITE: NORMAL
SODIUM SERPL-SCNC: 134 MMOL/L (ref 135–145)
SOURCE SOURCE: NORMAL
WBC # BLD AUTO: 19 K/UL (ref 4.8–10.8)

## 2023-02-23 PROCEDURE — 97110 THERAPEUTIC EXERCISES: CPT

## 2023-02-23 PROCEDURE — 80048 BASIC METABOLIC PNL TOTAL CA: CPT

## 2023-02-23 PROCEDURE — 700105 HCHG RX REV CODE 258: Performed by: INTERNAL MEDICINE

## 2023-02-23 PROCEDURE — 96365 THER/PROPH/DIAG IV INF INIT: CPT

## 2023-02-23 PROCEDURE — 700111 HCHG RX REV CODE 636 W/ 250 OVERRIDE (IP): Performed by: INTERNAL MEDICINE

## 2023-02-23 PROCEDURE — 700102 HCHG RX REV CODE 250 W/ 637 OVERRIDE(OP): Performed by: HOSPITALIST

## 2023-02-23 PROCEDURE — 700111 HCHG RX REV CODE 636 W/ 250 OVERRIDE (IP): Performed by: NURSE PRACTITIONER

## 2023-02-23 PROCEDURE — 97162 PT EVAL MOD COMPLEX 30 MIN: CPT

## 2023-02-23 PROCEDURE — 85027 COMPLETE CBC AUTOMATED: CPT

## 2023-02-23 PROCEDURE — 700101 HCHG RX REV CODE 250: Performed by: INTERNAL MEDICINE

## 2023-02-23 PROCEDURE — 770001 HCHG ROOM/CARE - MED/SURG/GYN PRIV*

## 2023-02-23 PROCEDURE — 97165 OT EVAL LOW COMPLEX 30 MIN: CPT

## 2023-02-23 PROCEDURE — 99233 SBSQ HOSP IP/OBS HIGH 50: CPT | Performed by: INTERNAL MEDICINE

## 2023-02-23 PROCEDURE — 700102 HCHG RX REV CODE 250 W/ 637 OVERRIDE(OP): Performed by: NURSE PRACTITIONER

## 2023-02-23 PROCEDURE — 97116 GAIT TRAINING THERAPY: CPT

## 2023-02-23 PROCEDURE — 700105 HCHG RX REV CODE 258: Performed by: NURSE PRACTITIONER

## 2023-02-23 PROCEDURE — 96375 TX/PRO/DX INJ NEW DRUG ADDON: CPT

## 2023-02-23 PROCEDURE — 96367 TX/PROPH/DG ADDL SEQ IV INF: CPT

## 2023-02-23 PROCEDURE — A9270 NON-COVERED ITEM OR SERVICE: HCPCS | Performed by: HOSPITALIST

## 2023-02-23 PROCEDURE — 94760 N-INVAS EAR/PLS OXIMETRY 1: CPT

## 2023-02-23 PROCEDURE — A9270 NON-COVERED ITEM OR SERVICE: HCPCS | Performed by: NURSE PRACTITIONER

## 2023-02-23 PROCEDURE — 96366 THER/PROPH/DIAG IV INF ADDON: CPT

## 2023-02-23 PROCEDURE — 97535 SELF CARE MNGMENT TRAINING: CPT

## 2023-02-23 PROCEDURE — 83605 ASSAY OF LACTIC ACID: CPT | Mod: 91

## 2023-02-23 PROCEDURE — 99291 CRITICAL CARE FIRST HOUR: CPT

## 2023-02-23 PROCEDURE — 71045 X-RAY EXAM CHEST 1 VIEW: CPT

## 2023-02-23 PROCEDURE — 36415 COLL VENOUS BLD VENIPUNCTURE: CPT

## 2023-02-23 RX ORDER — ENOXAPARIN SODIUM 100 MG/ML
40 INJECTION SUBCUTANEOUS DAILY
Status: DISCONTINUED | OUTPATIENT
Start: 2023-02-23 | End: 2023-03-02 | Stop reason: HOSPADM

## 2023-02-23 RX ORDER — ACETAMINOPHEN 500 MG
1000 TABLET ORAL EVERY 6 HOURS PRN
Status: DISCONTINUED | OUTPATIENT
Start: 2023-02-23 | End: 2023-03-02 | Stop reason: HOSPADM

## 2023-02-23 RX ORDER — SODIUM CHLORIDE 9 MG/ML
1000 INJECTION, SOLUTION INTRAVENOUS ONCE
Status: COMPLETED | OUTPATIENT
Start: 2023-02-23 | End: 2023-02-23

## 2023-02-23 RX ORDER — SODIUM HYPOCHLORITE 1.25 MG/ML
SOLUTION TOPICAL 2 TIMES DAILY
Status: DISCONTINUED | OUTPATIENT
Start: 2023-02-23 | End: 2023-03-02 | Stop reason: HOSPADM

## 2023-02-23 RX ADMIN — ACETAMINOPHEN 1000 MG: 500 TABLET ORAL at 10:02

## 2023-02-23 RX ADMIN — SODIUM CHLORIDE 1000 ML: 9 INJECTION, SOLUTION INTRAVENOUS at 09:49

## 2023-02-23 RX ADMIN — ACETAMINOPHEN 1000 MG: 500 TABLET ORAL at 03:15

## 2023-02-23 RX ADMIN — SIMVASTATIN 10 MG: 10 TABLET, FILM COATED ORAL at 05:37

## 2023-02-23 RX ADMIN — DAKIN'S SOLUTION 0.125% (QUARTER STRENGTH) 5 ML: 0.12 SOLUTION at 17:42

## 2023-02-23 RX ADMIN — VANCOMYCIN HYDROCHLORIDE 750 MG: 500 INJECTION, POWDER, LYOPHILIZED, FOR SOLUTION INTRAVENOUS at 01:14

## 2023-02-23 RX ADMIN — DAKIN'S SOLUTION 0.125% (QUARTER STRENGTH) 473 ML: 0.12 SOLUTION at 09:59

## 2023-02-23 RX ADMIN — ENOXAPARIN SODIUM 40 MG: 40 INJECTION SUBCUTANEOUS at 17:43

## 2023-02-23 RX ADMIN — CEFTRIAXONE SODIUM 2000 MG: 2 INJECTION, POWDER, FOR SOLUTION INTRAMUSCULAR; INTRAVENOUS at 05:36

## 2023-02-23 RX ADMIN — ACETAMINOPHEN 1000 MG: 500 TABLET ORAL at 19:18

## 2023-02-23 RX ADMIN — SODIUM CHLORIDE: 9 INJECTION, SOLUTION INTRAVENOUS at 20:27

## 2023-02-23 ASSESSMENT — ENCOUNTER SYMPTOMS
SHORTNESS OF BREATH: 0
FOCAL WEAKNESS: 0
NAUSEA: 0
DIZZINESS: 0
ABDOMINAL PAIN: 0
MYALGIAS: 1
DIARRHEA: 0
FEVER: 1
SORE THROAT: 0
HEARTBURN: 0
WEAKNESS: 0
VOMITING: 0
BACK PAIN: 0
BLOOD IN STOOL: 0
COUGH: 0
HEADACHES: 0
DEPRESSION: 0
CHILLS: 0
HALLUCINATIONS: 0
PALPITATIONS: 0

## 2023-02-23 ASSESSMENT — ACTIVITIES OF DAILY LIVING (ADL): TOILETING: INDEPENDENT

## 2023-02-23 ASSESSMENT — COGNITIVE AND FUNCTIONAL STATUS - GENERAL
MOVING TO AND FROM BED TO CHAIR: UNABLE
MOVING FROM LYING ON BACK TO SITTING ON SIDE OF FLAT BED: A LITTLE
CLIMB 3 TO 5 STEPS WITH RAILING: A LOT
HELP NEEDED FOR BATHING: A LITTLE
DRESSING REGULAR LOWER BODY CLOTHING: A LITTLE
DAILY ACTIVITIY SCORE: 22
WALKING IN HOSPITAL ROOM: A LITTLE
SUGGESTED CMS G CODE MODIFIER DAILY ACTIVITY: CJ
MOBILITY SCORE: 16
SUGGESTED CMS G CODE MODIFIER MOBILITY: CK
STANDING UP FROM CHAIR USING ARMS: A LITTLE

## 2023-02-23 ASSESSMENT — PATIENT HEALTH QUESTIONNAIRE - PHQ9
SUM OF ALL RESPONSES TO PHQ9 QUESTIONS 1 AND 2: 0
2. FEELING DOWN, DEPRESSED, IRRITABLE, OR HOPELESS: NOT AT ALL
1. LITTLE INTEREST OR PLEASURE IN DOING THINGS: NOT AT ALL

## 2023-02-23 ASSESSMENT — GAIT ASSESSMENTS
DISTANCE (FEET): 100
ASSISTIVE DEVICE: FRONT WHEEL WALKER
GAIT LEVEL OF ASSIST: CONTACT GUARD ASSIST
DEVIATION: ANTALGIC;STEP TO

## 2023-02-23 ASSESSMENT — PAIN DESCRIPTION - PAIN TYPE
TYPE: ACUTE PAIN
TYPE: SURGICAL PAIN
TYPE: SURGICAL PAIN
TYPE: ACUTE PAIN
TYPE: ACUTE PAIN
TYPE: SURGICAL PAIN
TYPE: ACUTE PAIN

## 2023-02-23 ASSESSMENT — FIBROSIS 4 INDEX: FIB4 SCORE: 1.04

## 2023-02-23 NOTE — ASSESSMENT & PLAN NOTE
Presented acutely with sepsis secondary to left total knee infection w strep bacteramia  Surgical washout 2/22  Repeat washout planned for tomorrow 2/28  Changed to Unasyn (lower C Dif) + Clindamycin for concern for nec fasc (f/u CT)  Discussed with ID, follow recommendations  L knee and blood cx 2/22 + Group A Strep  Will need 6 weeks of IV antibiotics from date of his last surgery, 2/28  LE swelling below knee, CT today to rule out abscess / fluid collection which would need to be washed out  PICC line placed today

## 2023-02-23 NOTE — PROGRESS NOTES
Pain improved  AVSS, HVAC 60cc    Cx with strep group A    Leg edema improved  NVI    S/p I and D infected left TKA  Awaiting culture sensitivities  Will require secondary surgery once sensitivities are known  Will paln repeat I and D versus explant of prosthesis depending on results

## 2023-02-23 NOTE — OP REPORT
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     POSTOPERATIVE DIAGNOSIS:  Infected left total knee arthroplasty.     PROCEDURE:  Irrigation and debridement, left total knee arthroplasty.     SURGEON:  Brodie Del Valle MD     ASSISTANT:  None.     ANESTHESIA:  General endotracheal anesthesia.     ANESTHESIOLOGIST:  ____.      ESTIMATED BLOOD LOSS:  250 mL.     COMPLICATIONS:  None.     INDICATIONS FOR PROCEDURE:  This is a 72-year-old male who is 3 weeks status   post left total knee arthroplasty.  He admits with worsening pain over the   last 24 hours.  He has signs of significant infection.  He presents for   operative treatment.  For further details of H and P, please see chart.     Risks, benefits and alternatives of procedure were discussed with the patient   and wife including but not limited to bleeding, infection, neurovascular   injury, need for further surgery, PE, DVT, blood transfusion with its   associated risks, anesthesia with its associated risks, and death.  All   questions were answered.  No warranties or guarantees were given or implied.    Consent is signed and is on chart.     DESCRIPTION OF PROCEDURE:  The patient was taken to the operating room and   placed supine on the operating room table.  General endotracheal anesthesia   was induced.  The patient's left lower extremity was prepped and draped in   normal sterile fashion.  Upon flexion of the knee, there was significant   purulent fluid seen from the inferior aspect of his knee incision.  His   previous incision was incised in its entirety.  On entering the subcutaneous   tissue, there was moderate amount of purulent material.  This was lavaged with   Pulsavac .  His medial parapatellar arthrotomy was then opened.    There was copious amounts of purulent material in the knee that was noted.    Medial release was performed to the joint line.  Patella was mobilized  and   everted and the knee flexed to 90 degrees.  Debridement was carried out of all   visible bony areas to remove any dead or purulent material.  This included   the medial and lateral gutters in both the tibia and femur as well as the soft   tissues of the retinaculum as well as around the patella.  At this point, the   knee was thoroughly lavaged with 5 L of normal saline.  Further examination   showed no further obvious necrotic or purulent tissue.  The knee was then   thoroughly lavaged with Betadine, which was allowed to set and then removed.    It was then relavaged with IrriSept, which was allowed to sit for 3 minutes.    This was then removed with lavage.  A medium Hemovac was placed out to the   superolateral aspect of the knee.  Parapatellar arthrotomy was closed using 0   Stratafix, subcutaneous tissue closed using 2-0 PDS, skin closed using   staples.  The patient had a Prevena dressing as well as a soft dressing   placed.  He was awakened from anesthesia and returned to recovery cart in the   recovery room in stable condition.  There were no anam or intraoperative   complications noted.        ______________________________  MD GABBIE Yu/TO/HARISH    DD:  02/22/2023 15:27  DT:  02/22/2023 17:27    Job#:  [id number]

## 2023-02-23 NOTE — OR NURSING
1620: Report from TONY [place].    1625: Updated Dr. Myers regarding pt's fever.    1630; New orders received. Pt states throbbing pain is 5/10 in the LLE . Leg elevated, pt states some relief.  Denies nausea.   Hemovac (compressed) and Prevena in place.  +1 pedal pulse on L foot.     1645: Dr Ramon at bedside. Will be admitting the pt. Pt medicated for fever and pain.   Wife updated.   Pt sipping on water and refusing any carb drinks.  Awaiting admission to unit.     1807: Pt meets criteria to be transferred to unit, admission order received. Waiting for [facility] RN call back. Wife at bedside.     1820: Report to TONY [place].

## 2023-02-23 NOTE — PROGRESS NOTES
Received phone call from Lab at [phone]. Critical result of blood cultures with Gram + Cocci. Notified Hospitalist, Dr Berger, at [phone].

## 2023-02-23 NOTE — H&P
Hospital Medicine History & Physical Note    Date of Service  2/22/2023    Primary Care Physician  Edenilson Simpson M.D.    Consultants       Specialist Names:     Code Status  No Order    Chief Complaint  Chief Complaint   Patient presents with    Knee Pain       History of Presenting Illness  Raymundo Jackson is a 72 y.o. male who was referred to hospitalist medicine service for concern of sepsis in setting of total knee arthroplasty.  Patient underwent washout and drainage today surgically.     His hemodynamics are concerning earlier today he spiked a temp up to 104.5 with tachypnea at the time he was oxygenating well and his blood pressure was actually mildly elevated..  However since then his blood pressure has become soft and he is borderline hypoxemic with SPO2 in the 90s.  He also has leukocytosis 23.3 with leftward shift.  Lactic acid this morning was elevated at 2.3 it is being redrawn now at 6:56 PM.  He has already been bolused once with greater than 2.7 L I have ordered second bolus per sepsis protocol, as his blood pressure is now soft, 's.     Ceftriaxone and vancomycin event initiated.       I discussed the plan of care with patient, bedside RN, and Dr. Tristen Hill .    Review of Systems  Review of Systems   Constitutional:  Positive for fever.   HENT: Negative.     Eyes: Negative.    Respiratory: Negative.     Cardiovascular: Negative.    Gastrointestinal: Negative.    Genitourinary: Negative.    Musculoskeletal:  Positive for joint pain.   Skin: Negative.    Neurological: Negative.    Endo/Heme/Allergies: Negative.    Psychiatric/Behavioral: Negative.       Past Medical History   has a past medical history of Arthritis, Awareness under anesthesia, Dental disorder, Diabetes (HCC), Disorder of thyroid, Hemorrhagic disorder (HCC), Hepatitis C, High cholesterol, and Pain.    Surgical History   has a past surgical history that includes pr total knee arthroplasty (Right, 09/07/2022);  knee arthroscopy (Right); knee arthroscopy (Right); tonsillectomy; and pr total knee arthroplasty (Left, 2/1/2023).     Family History  family history is not on file.   Family history reviewed with patient. There is no family history that is pertinent to the chief complaint.     Social History   reports that he has never smoked. He has never used smokeless tobacco. He reports that he does not drink alcohol and does not use drugs.    Allergies  Allergies   Allergen Reactions    Oxycodone Unspecified     Sees double and triple vision        Medications  Prior to Admission Medications   Prescriptions Last Dose Informant Patient Reported? Taking?   Ascorbic Acid (VITAMIN C PO) 2/21/2023 at 0900 Patient Yes No   Sig: Take 1 Tablet by mouth every day.   Calcium Carbonate Antacid (CALCIUM CARBONATE PO) 2/21/2023 at 0900 Patient Yes Yes   Sig: Take 1 Tablet by mouth every day.   Cholecalciferol (D3 PO) 2/21/2023 at 0900 Patient Yes Yes   Sig: Take 1 Capsule by mouth every day.   Ferrous Sulfate (IRON PO) 2/21/2023 at 0900 Patient Yes Yes   Sig: Take 1 Tablet by mouth every day.   HYDROcodone-acetaminophen (NORCO) 5-325 MG Tab per tablet 2/16/2023 at Unknown Patient Yes No   Sig: Take 1 Tablet by mouth every 6 hours as needed. Indications: Pain   VITAMIN E PO 2/21/2023 at 0900 Patient Yes Yes   Sig: Take 1 Tablet by mouth every day.   acetaminophen (TYLENOL) 500 MG Tab 2/22/2023 at 0500 Patient Yes No   Sig: Take 1,000 mg by mouth every 6 hours as needed. Indications: Pain   aspirin (ASA) 325 MG Tab 2/21/2023 at 1600 Patient Yes Yes   Sig: Take 650 mg by mouth every 6 hours as needed. Indications: Pain   metFORMIN (GLUCOPHAGE) 500 MG Tab 2/21/2023 at 0900 Patient Yes No   Sig: Take 500 mg by mouth every day. In AM   simvastatin (ZOCOR) 10 MG Tab 2/21/2023 at 0900 Patient Yes No   Sig: Take 10 mg by mouth every day. In AM   vitamin e (VITAMIN E) 400 UNIT Cap  Patient Yes No   Sig: Take 400 Units by mouth every day.       Facility-Administered Medications: None       Physical Exam  Temp:  [36.6 °C (97.9 °F)-40.8 °C (105.4 °F)] 36.6 °C (97.9 °F)  Pulse:  [] 81  Resp:  [16-30] 18  BP: (100-184)/(62-98) 101/63  SpO2:  [90 %-100 %] 90 %  Blood Pressure : 101/63   Temperature: 36.6 °C (97.9 °F)   Pulse: 81   Respiration: 18   Pulse Oximetry: 90 %       Physical Exam  Constitutional:       General: He is not in acute distress.     Appearance: Normal appearance. He is not toxic-appearing.   HENT:      Head: Normocephalic and atraumatic.   Eyes:      Extraocular Movements: Extraocular movements intact.      Pupils: Pupils are equal, round, and reactive to light.   Cardiovascular:      Rate and Rhythm: Normal rate and regular rhythm.      Pulses: Normal pulses.      Heart sounds: Normal heart sounds.   Pulmonary:      Effort: Pulmonary effort is normal.      Breath sounds: Normal breath sounds. No wheezing, rhonchi or rales.   Abdominal:      General: Abdomen is flat. Bowel sounds are normal.      Tenderness: There is no guarding or rebound.   Musculoskeletal:         General: Normal range of motion.      Cervical back: Normal range of motion.      Comments: Left bandage to knee after washout of to TKA   Skin:     General: Skin is warm and dry.      Capillary Refill: Capillary refill takes 2 to 3 seconds.   Neurological:      General: No focal deficit present.      Mental Status: He is alert and oriented to person, place, and time.   Psychiatric:         Mood and Affect: Mood normal.         Behavior: Behavior normal.       Laboratory:  Recent Labs     02/22/23  0925   WBC 23.3*   RBC 4.52*   HEMOGLOBIN 14.5   HEMATOCRIT 42.7   MCV 94.5   MCH 32.1   MCHC 34.0   RDW 45.6   PLATELETCT 313   MPV 10.7     Recent Labs     02/22/23  0925   SODIUM 131*   POTASSIUM 4.2   CHLORIDE 94*   CO2 25   GLUCOSE 194*   BUN 21   CREATININE 1.09   CALCIUM 9.3     Recent Labs     02/22/23  0925   ALTSGPT 22   ASTSGOT 21   ALKPHOSPHAT 111*   TBILIRUBIN 1.3    GLUCOSE 194*     Recent Labs     02/22/23  0925   APTT 28.4   INR 1.22*     No results for input(s): NTPROBNP in the last 72 hours.      No results for input(s): TROPONINT in the last 72 hours.    Imaging:  DX-KNEE 2- LEFT   Final Result         1. Moderate joint effusion, new since prior.               Assessment/Plan:  Justification for Admission Status  I anticipate this patient will require at least two midnights for appropriate medical management, necessitating inpatient admission because of concern for severe sepsis in setting of infected total knee arthroplasty    Patient will need a Med/Surg bed on MEDICAL service .  The need is secondary to sepsis.    * Infection of prosthetic knee joint, initial encounter (HCC)- (present on admission)  Assessment & Plan  Surgical washout done today 2/22      Sepsis ([facility])  Assessment & Plan  This is Sepsis Present on admission  SIRS criteria identified on my evaluation include: Fever, with temperature greater than 101 deg F, Leukocytosis, with WBC greater than 12,000 and Bandemia, greater than 10% bands  Source is infected prosthetic knee  Sepsis protocol initiated  Fluid resuscitation ordered per protocol  Crystalloid Fluid Administration: Fluid resuscitation ordered per standard protocol - 30 mL/kg per current or ideal body weight  IV antibiotics as appropriate for source of sepsis  Reassessment: I have reassessed the patient's hemodynamic status    Ceftriaxone and vancomycin ordered   fluid resuscitation ordered with NS as he is hyponatremia and hyperchloremic  Lactic acid 2.3 at 9 AM reordered now 6:38 PM  Tachypnea 22, hypoxemia 90%  Tmax 104.5 this afternoon  Leukocytosis 23.3 with leftward shift  Blood cultures ordered    Concern for patient decompensation would recommend transfer to higher level of care if next lactic remains elevated          Dyslipidemia- (present on admission)  Assessment & Plan  Continue home ASA and Zocor    Prediabetes  Assessment &  Plan  Reportedly prediabetic  A1c ordered  Metformin on hold        VTE prophylaxis: enoxaparin ppx

## 2023-02-23 NOTE — CARE PLAN
"The patient is Stable - Low risk of patient condition declining or worsening    Shift Goals  Clinical Goals: BP will remain stable, no fevers, Lactic to trend down  Patient Goals: \"Get all my antibiotics and start getting better\"    Progress made toward(s) clinical / shift goals:  Pt lactic 2.7 and 2.1 during the night, but VS stable, pt reports BP runs low in general. Received boluses last night. Minimal pain to LLE, although very edematous. Tylenol given only for headache. No fevers over night.        Problem: Knowledge Deficit - Standard  Goal: Patient and family/care givers will demonstrate understanding of plan of care, disease process/condition, diagnostic tests and medications  Outcome: Progressing     Problem: Hemodynamics  Goal: Patient's hemodynamics, fluid balance and neurologic status will be stable or improve  Outcome: Progressing     Problem: Fluid Volume  Goal: Fluid volume balance will be maintained  Outcome: Progressing     Problem: Physical Regulation  Goal: Diagnostic test results will improve  Outcome: Progressing     Problem: Fall Risk  Goal: Patient will remain free from falls  Outcome: Progressing     "

## 2023-02-23 NOTE — PROGRESS NOTES
Pharmacy Vancomycin Kinetics Note for 2/22/2023     72 y.o. male on Vancomycin day # 1     Vancomycin Indication (AUC Dosing): Skin/skin structure infection    Provider specified end date: 02/27/23    Active Antibiotics (From admission, onward)      Ordered     Ordering Provider       Wed Feb 22, 2023  6:27 PM    02/22/23 1827  cefTRIAXone (Rocephin) 2,000 mg in  mL IVPB  EVERY 24 HOURS         Carlos Hill M.D.    02/22/23 1827  MD Alert...Vancomycin per Pharmacy  PHARMACY TO DOSE        Question:  Indication(s) for vancomycin?  Answer:  Skin and soft tissue infection    Carlos Hill M.D.            Dosing Weight: 90.4 kg (199 lb 4.7 oz)      Admission History: Admitted on 2/22/2023 for Infection of prosthetic knee joint, initial encounter ([facility]) [T84.59XA, Z96.659]  Pertinent history: Knee replacement/ revision w/ infected hardware    Allergies:     Oxycodone     Pertinent cultures to date:     Results       Procedure Component Value Units Date/Time    Blood Culture [[id number]]     Order Status: Sent Specimen: Blood from Peripheral     Blood Culture [[id number]]     Order Status: Sent Specimen: Blood from Peripheral     CULTURE TISSUE W/ GRM STAIN [[id number]] Collected: 02/22/23 1428    Order Status: No result Specimen: Tissue Updated: 02/22/23 1533     Significant Indicator NEG     Source TISS     Site knee     Culture Result -     Gram Stain Result -    Narrative:      3 knee tissue  left knee deep    Anaerobic Culture [[id number]] Collected: 02/22/23 1428    Order Status: No result Specimen: Tissue Updated: 02/22/23 1533     Significant Indicator NEG     Source TISS     Site knee     Culture Result -    Narrative:      3 knee tissue  left knee deep    Anaerobic Culture [[id number]] Collected: 02/22/23 1426    Order Status: No result Specimen: Body Fluid Updated: 02/22/23 1531     Significant Indicator NEG     Source BF     Site left knee deep     Culture Result -    Narrative:       "2 knee body fluid  left knee deep    Anaerobic Culture [[id number]] Collected: 23 1417    Order Status: No result Specimen: Body Fluid Updated: 23 1530     Significant Indicator NEG     Source BF     Site left knee deep     Culture Result -    Narrative:      1 knee body fluid  left knee deep    Culture Wound With Gram Stain [[id number]] Collected: 2330    Order Status: Sent Specimen: Wound from Left Leg Updated: 23 1042    Blood Culture [[id number]] Collected: 2348    Order Status: Sent Specimen: Blood from Peripheral Updated: 2353    Narrative:      2 of 2 blood culture x2  Sites order. Per Hospital Policy:  Only change Specimen Src: to \"Line\" if specified by physician  order.    Blood Culture [[id number]] Collected: 23    Order Status: Sent Specimen: Blood from Peripheral Updated: 23    Narrative:      1 of 2 for Blood Culture x 2 sites order. Per Hospital  Policy: Only change Specimen Src: to \"Line\" if specified by  physician order.            Labs:     Estimated Creatinine Clearance: 66.2 mL/min (by C-G formula based on SCr of 1.09 mg/dL).  Recent Labs     23   WBC 23.3*   NEUTSPOLYS 89.30*     Recent Labs     23   BUN 21   CREATININE 1.09   ALBUMIN 3.6       Intake/Output Summary (Last 24 hours) at 2023 1848  Last data filed at 2023 1556  Gross per 24 hour   Intake 1000 ml   Output --   Net 1000 ml      /63   Pulse 81   Temp 36.6 °C (97.9 °F) (Temporal)   Resp 18   Ht 1.829 m (6')   Wt 90.4 kg (199 lb 4.7 oz)   SpO2 90%  Temp (24hrs), Av.5 °C (101.3 °F), Min:36.6 °C (97.9 °F), Max:40.8 °C (105.4 °F)    List concerns for Vancomycin clearance:     Age    Pharmacokinetics:     AUC kinetics:   Ke (hr ^-1): 0.0593 hr^-1  Half life: 11.69 hr  Clearance: 3.484  Estimated TDD: 1742  Estimated Dose: 994  Estimated interval: 13.7    A/P:     -  Vancomycin dose: 750 q12h    -  Next vancomycin level(s): "    -2/24  @ 1600   -2/25 Vt @ 0030     -  Predicted vancomycin AUC from initial AUC test calculator: 431 mg·hr/L    -  Comments: Knee revision w/ fevers.     Shay Centeno, TammiD

## 2023-02-23 NOTE — PROGRESS NOTES
Received report and arrived to the floor. Assumed care of patient. Discussed daily plan of care, oriented pt to floor. VSS. Discussed POC with the patient. Med rec and admission profile completed. Hourly rounding in place.

## 2023-02-23 NOTE — PROGRESS NOTES
Received report from day shift RN. Pt resting in bed. Denies pain. VS stable. Pt updated on plan of care.

## 2023-02-23 NOTE — THERAPY
Physical Therapy   Initial Evaluation     Patient Name: Raymundo Jackson  Age:  72 y.o., Sex:  male  Medical Record #: [id number]  Today's Date: 2/23/2023     Precautions  Precautions: (P) Fall Risk;Weight Bearing As Tolerated Left Lower Extremity    Assessment    Patient is 72 y.o. male who was recently admitted for L knee infection as he had recently had L TKA. Pt is now s/p I&D of L TKA with WBAT orders for L LE. Pt presented to PT with impaired balance, impaired gait, weakness, pain, dec ROM, and poor upright activity tolerance. Pt was able to return demo Min A for bed mobility and CGA for upright mobility with FWW use. Pt was primarily limited by pain and poor ROM in L LE. Pt was educated on elevation, icing, positioning, and supine therapeutic exercises. Anticipate pt to progress while in acute cares setting and d/c home with family assist and OP therapy services.     Pt was provided with following txt: Pt provided with VC and demo for step to gait mechanics with use of FWW due to frequent L knee buckling during ambulation. Pt provided with VC and demo for supine therapeutic exercises and provided with handout.     Plan    Physical Therapy Initial Treatment Plan   Treatment Plan : (P) Bed Mobility, Equipment, Manual Therapy, Gait Training, Neuro Re-Education / Balance, Self Care / Home Evaluation, Stair Training, Therapeutic Activities, Therapeutic Exercise  Treatment Frequency: (P) Daily  Duration: (P) Until Therapy Goals Met    DC Equipment Recommendations: (P) Front-Wheel Walker  Discharge Recommendations: (P) Recommend outpatient physical therapy services to address higher level deficits     Objective       02/23/23 0815   Initial Contact Note    Initial Contact Note Order Received and Verified, Physical Therapy Evaluation in Progress with Full Report to Follow.   Precautions   Precautions Fall Risk;Weight Bearing As Tolerated Left Lower Extremity   Pain 0 - 10 Group   Location Knee   Location Orientation  Left   Description Aching   Therapist Pain Assessment Prior to Activity;During Activity;Post Activity;Nurse Notified;5   Prior Living Situation   Prior Services None   Housing / Facility 1 Story House   Steps Into Home 2   Steps In Home 0   Equipment Owned Front-Wheel Walker   Lives with - Patient's Self Care Capacity Spouse   Comments pt reports of an IPLOF with mobility   Prior Level of Functional Mobility   Bed Mobility Independent   Transfer Status Independent   Ambulation Independent   Distance Ambulation (Feet)   (community)   Assistive Devices Used None   Stairs Independent   History of Falls   History of Falls No   Cognition    Cognition / Consciousness WDL   Comments pleasant/coopeartive   Passive ROM Lower Body   Passive ROM Lower Body X   Lt Knee Flexion Degrees 70   Lt Knee Extension Degrees 0   Active ROM Lower Body    Active ROM Lower Body  X   Comments limited by pain, able to demo 0-35   Strength Lower Body   Lower Body Strength  X   Comments limited due to pain, able to demo functional strength for B LE   Sensation Lower Body   Lower Extremity Sensation   WDL   Lower Body Muscle Tone   Lower Body Muscle Tone  WDL   Neurological Concerns   Neurological Concerns No   Coordination Upper Body   Coordination WDL   Coordination Lower Body    Coordination Lower Body  WDL   Balance Assessment   Sitting Balance (Static) Good   Sitting Balance (Dynamic) Fair +   Standing Balance (Static) Fair +   Standing Balance (Dynamic) Fair   Weight Shift Sitting Good   Weight Shift Standing Fair   Comments w/fww   Bed Mobility    Supine to Sit Minimal Assist   Scooting Supervised   Comments HOB elevated and rails up   Gait Analysis   Gait Level Of Assist Contact Guard Assist   Assistive Device Front Wheel Walker   Distance (Feet) 100   # of Times Distance was Traveled 1   Deviation Antalgic;Step To   Weight Bearing Status WBAT L LE   Functional Mobility   Sit to Stand Contact Guard Assist   Bed, Chair, Wheelchair  Transfer Contact Guard Assist   Transfer Method Stand Step   Mobility EOB, sit<>stand, ambulation, transfer to chair   How much difficulty does the patient currently have...   Turning over in bed (including adjusting bedclothes, sheets and blankets)? 4   Sitting down on and standing up from a chair with arms (e.g., wheelchair, bedside commode, etc.) 3   Moving from lying on back to sitting on the side of the bed? 1   How much help from another person does the patient currently need...   Moving to and from a bed to a chair (including a wheelchair)? 3   Need to walk in a hospital room? 3   Climbing 3-5 steps with a railing? 2   6 clicks Mobility Score 16   Activity Tolerance   Sitting in Chair left up in chair   Sitting Edge of Bed 5 mins   Standing 8 mins   Comments limited by pain   Edema / Skin Assessment   Edema / Skin  X   Comments presents with L LE swelling   Patient / Family Goals    Patient / Family Goal #1 to go home   Short Term Goals    Short Term Goal # 1 pt will go supine<>sit w/hob flat and rails down w/spv in 6tx   Short Term Goal # 2 pt will go sit<>stand w/fww w/spv in 6tx   Short Term Goal # 3 pt will transfer bed<>chair w/fww w/spv in 6tx   Short Term Goal # 4 pt will ambulate for 150ft w/fww w/spv in 6tx   Short Term Goal # 5 pt will go up/down 2 steps w/spv in 6tx   Education Group   Education Provided Role of Physical Therapist   Role of Physical Therapist Patient Response Patient;Acceptance;Explanation;Demonstration;Verbal Demonstration;Action Demonstration   Physical Therapy Initial Treatment Plan    Treatment Plan  Bed Mobility;Equipment;Manual Therapy;Gait Training;Neuro Re-Education / Balance;Self Care / Home Evaluation;Stair Training;Therapeutic Activities;Therapeutic Exercise   Treatment Frequency Daily   Duration Until Therapy Goals Met   Problem List    Problems Pain;Impaired Bed Mobility;Impaired Transfers;Impaired Ambulation;Functional Strength Deficit;Impaired Balance;Functional ROM  Deficit;Decreased Activity Tolerance   Anticipated Discharge Equipment and Recommendations   DC Equipment Recommendations Front-Wheel Walker   Discharge Recommendations Recommend outpatient physical therapy services to address higher level deficits   Interdisciplinary Plan of Care Collaboration   IDT Collaboration with  Nursing   Patient Position at End of Therapy Seated;Call Light within Reach;Tray Table within Reach;Phone within Reach   Collaboration Comments aware of visit and recs   Session Information   Date / Session Number  2/23-1 (1/7, 2/29)   Priority 4

## 2023-02-23 NOTE — DISCHARGE PLANNING
Case Management Discharge Planning    Admission Date: 2/22/2023  GMLOS:    ALOS: 0    6-Clicks ADL Score: 22  6-Clicks Mobility Score: 16  PT and/or OT Eval ordered: Yes  Post-acute Referrals Ordered: NA  Post-acute Choice Obtained: NA  Has referral(s) been sent to post-acute provider:  [facility]      Anticipated Discharge Dispo: Discharge Disposition: Discharged to home/self care (01)    DME Needed: No    Action(s) Taken: Updated Provider/Nurse on Discharge Plan    1250: Per MD, patient will likely require home infusions.  Pending ID consultation as well as cultures.     Escalations Completed: None    Medically Clear: No    Next Steps: Medical clearance    Barriers to Discharge: Medical clearance

## 2023-02-23 NOTE — ASSESSMENT & PLAN NOTE
Secondary to recent total knee which was done in early February by Dr. James  S/P washout 2/22 and 2/28  Continue antibiotics per ID  Discussed with ID  Added gabapentin and tramadol for pain

## 2023-02-23 NOTE — ASSESSMENT & PLAN NOTE
He had profound lactic acidosis due to septic left knee  Status post washout for source control on 2/22, repeat washout planned for tmrw  NPO at MN  Continue empiric antibiotics, Unasyn and Clinda  Blood and L knee cultures so far with group A strep  He has fluid overload and lower extremity/perineal edema from fluids given for sepsis  Resolved

## 2023-02-23 NOTE — DISCHARGE PLANNING
"HTH/[facility] TCN chart review completed. Collaborated with DALTON Finch prior to meeting with the pt. The most current review of medical record, knowledge of pt's PLOF and social support, LACE+ score of 26, 6 clicks scores of 16 mobility were considered.      Pt seen at bedside. Introduced TCN program. Provided education regarding post acute levels of care. Discussed HTH/SCP plan benefits (Meds to Beds, medical uber and [facility] transitional care). Pt verbalizes understanding.     Patient reports he lives with wife, who is supportive, and can assist.  Reports he has ambulated halls and is limited by pain, does not feel off balanced.  Patient has no concerns about discharge home and plans to return to OP PT once medically able.  Per DO note on 2/23, \"Surgical washout done 2/22,\" and Per CM note on 2/23, \"1250: Per MD, patient will likely require home infusions.\"   No further TCN needs at this time.     No choice proactively obtained given no need . TCN will continue to follow and collaborate with discharge planning team as additional post acute needs arise. Thank you.     Completed today:  PT recommends OP PT  No need for DME  [facility] referral sent - 2/23/2023    "

## 2023-02-23 NOTE — ASSESSMENT & PLAN NOTE
Blood cultures from 2/22 returned positive in 2 of 2 cultures with strep continue IV ceftriaxone  Discussed with ID

## 2023-02-23 NOTE — CONSULTS
DATE OF SERVICE:  02/22/2023     ORTHOPEDIC CONSULTATION     CHIEF COMPLAINT:  Left knee pain.     HISTORY OF PRESENT ILLNESS:  This is a 72-year-old male who is 3 weeks status   post left total knee arthroplasty.  He reports he started having some minimal   serous drainage from his wound yesterday.  It progressed to severe pain   overnight.  He presents to the emergency room with signs of sepsis including   elevated temperature and white count of 25,000.  I was asked to evaluate.     PAST MEDICAL HISTORY:  Significant for diabetes as well as hepatitis C,   disorder _____  thyroid as well as high cholesterol.     PAST SURGICAL HISTORY:  Includes bilateral total knee arthroplasties.     FAMILY HISTORY:  Noncontributory.     SOCIAL HISTORY:  The patient denies tobacco or alcohol use.     MEDICATIONS:  Please see admission H and P.     PHYSICAL EXAMINATION:  GENERAL:  The patient is alert and cooperative.    VITAL SIGNS:  Temperature 37.9 degrees Celsius.    EXTREMITIES:  Examination of the left leg shows significant erythema and   swelling about the knee and now in  the lower leg into his foot.  He has   purulent drainage from the incision.  He has significant pain with motion.  Of   note, he also has a wound on his right middle index finger on the ulnar side.    This is approximately 8 mm in diameter.  There is significant eschar   formation.  There is no significant edema or erythema.     LABORATORY DATA:  Demonstrated a white count of 23.3 with an elevated sed rate   and CRP.     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     PLAN:  The patient will be taken to the operating room for irrigation and   debridement.  Cultures will be taken at that time.  Once identifying an   organism, plan will be for either repeat irrigation and debridement with poly   exchange versus explantation of the total knee.  The patient will be kept on   antibiotics per the hospitalist service.  We will await cultures.  The  patient   may have a general diet.        ______________________________  MD GABBIE Yu/TO    DD:  02/22/2023 15:23  DT:  02/22/2023 19:04    Job#:  [id number]

## 2023-02-23 NOTE — PROGRESS NOTES
Hospital Medicine Daily Progress Note    Date of Service  2/23/2023    Chief Complaint  Raymundo Jackson is a 72 y.o. male admitted 2/22/2023 with left knee pain and fever    Hospital Course  History of recent left total knee arthroplasty who has been recovering well until 3 days prior to presentation when he noted profuse drainage from his surgical wound followed by sudden onset severe pain with weightbearing.  He presented to the ER and was found to have sepsis secondary to septic arthritis and his prosthetic joint.  Orthopedic surgery was consulted and they recommended emergent I&D    Interval Problem Update  2/23: Tolerating antibiotics, cultures growing strep, discontinue vancomycin.  Lactate still high, 1 L fluid bolus repeated.  Blood pressure .  Blood Cx + for strep in 2 of 2 cx    I have discussed this patient's plan of care and discharge plan at IDT rounds today with Case Management, Nursing, Nursing leadership, and other members of the IDT team.    Consultants/Specialty  orthopedics    Code Status  Full Code    Disposition  Patient is not medically cleared for discharge.   Anticipate discharge to to home with organized home healthcare and close outpatient follow-up.  I have placed the appropriate orders for post-discharge needs.    Review of Systems  Review of Systems   Constitutional:  Positive for fever. Negative for chills and malaise/fatigue.   HENT:  Negative for sore throat.    Respiratory:  Negative for cough and shortness of breath.    Cardiovascular:  Negative for chest pain and palpitations.   Gastrointestinal:  Negative for abdominal pain, blood in stool, diarrhea, heartburn, nausea and vomiting.   Genitourinary:  Negative for dysuria and frequency.   Musculoskeletal:  Positive for joint pain (Shooting pains, improving) and myalgias. Negative for back pain.   Neurological:  Negative for dizziness, focal weakness, weakness and headaches.   Psychiatric/Behavioral:  Negative for depression  and hallucinations.    All other systems reviewed and are negative.     Physical Exam  Temp:  [36.4 °C (97.6 °F)-40.3 °C (104.5 °F)] 36.4 °C (97.6 °F)  Pulse:  [] 68  Resp:  [16-22] 18  BP: ()/(59-95) 111/63  SpO2:  [90 %-100 %] 95 %    Physical Exam  Constitutional:       Appearance: Normal appearance.   HENT:      Head: Normocephalic and atraumatic.      Nose: Nose normal.      Mouth/Throat:      Mouth: Mucous membranes are moist.   Eyes:      Extraocular Movements: Extraocular movements intact.      Pupils: Pupils are equal, round, and reactive to light.   Cardiovascular:      Rate and Rhythm: Normal rate and regular rhythm.      Heart sounds: No murmur heard.  Pulmonary:      Effort: Pulmonary effort is normal. No respiratory distress.      Breath sounds: Normal breath sounds. No stridor.   Abdominal:      General: Abdomen is flat. Bowel sounds are normal. There is no distension.      Palpations: Abdomen is soft.      Tenderness: There is no abdominal tenderness.   Musculoskeletal:         General: Swelling present. No tenderness or deformity.      Cervical back: Normal range of motion and neck supple.      Comments: Left lower leg has some erythema and swelling, wound VAC in place over left knee   Skin:     General: Skin is warm and dry.      Coloration: Skin is not pale.      Findings: Erythema present.      Comments: 8 mm circumferential lesion on the back of his third finger, no purulent drainage noted but mild surrounding erythema   Neurological:      General: No focal deficit present.      Mental Status: He is alert and oriented to person, place, and time. Mental status is at baseline.   Psychiatric:         Mood and Affect: Mood normal.         Behavior: Behavior normal.         Thought Content: Thought content normal.       Fluids    Intake/Output Summary (Last 24 hours) at 2/23/2023 1407  Last data filed at 2/23/2023 0702  Gross per 24 hour   Intake 4683.41 ml   Output 1410 ml   Net 3273.41  ml       Laboratory  Recent Labs     02/22/23  0925 02/23/23  0809   WBC 23.3* 19.0*   RBC 4.52* 3.67*   HEMOGLOBIN 14.5 11.7*   HEMATOCRIT 42.7 36.0*   MCV 94.5 98.1*   MCH 32.1 31.9   MCHC 34.0 32.5*   RDW 45.6 48.5   PLATELETCT 313 231   MPV 10.7 10.9     Recent Labs     02/22/23  0925 02/23/23  0809   SODIUM 131* 134*   POTASSIUM 4.2 3.7   CHLORIDE 94* 100   CO2 25 25   GLUCOSE 194* 180*   BUN 21 20   CREATININE 1.09 0.93   CALCIUM 9.3 8.3*     Recent Labs     02/22/23 0925 02/22/23 2015   APTT 28.4  --    INR 1.22* 1.33*               Imaging  DX-CHEST-PORTABLE (1 VIEW)   Final Result      1.  No acute cardiopulmonary abnormality identified.      2.  Enlarged cardiac silhouette      DX-KNEE 2- LEFT   Final Result         1. Moderate joint effusion, new since prior.           Assessment/Plan  * Infection of prosthetic knee joint, initial encounter (HCC)- (present on admission)  Assessment & Plan  Presented acutely with sepsis secondary to left total knee infection  Surgical washout done 2/22 and he had marked improvement of his symptoms after this  Continue empiric ceftriaxone  DC vnaco  Will discuss with ID for abx regimen  Follow surgical cultures which are now showing strep, sensitivities pending  Follow blood cultures  Will need 6 weeks of IV antibiotics      Streptococcal bacteremia  Assessment & Plan  Blood cultures from 2/22 returned positive in 2 of 2 cultures with strep continue IV ceftriaxone  Will discuss with ID  Blood cultures need to be negative for 72 hours prior to PICC line placement  Repeat blood cultures on 2/24, every 48 hours until negative    Finger wound, simple, open- (present on admission)  Assessment & Plan  Does not currently look infected  Dressing changes    Septic arthritis (HCC)- (present on admission)  Assessment & Plan  Secondary to recent total knee which was done in early February by Dr. James  S/P washout  Planning for 6 weeks IV  Okay for PICC line after blood cultures  are negative x72 hours  Continue empiric antibiotics for now  DC vancomycin  Discuss with ID    Sepsis (HCC)  Assessment & Plan  He had profound lactic acidosis due to septic left knee  Status post washout for source control on 2/22  Continue empiric antibiotics  Cultures so far with strep    Dyslipidemia- (present on admission)  Assessment & Plan  Continue home ASA and Zocor    Prediabetes  Assessment & Plan  Reportedly prediabetic  A1c ordered  Metformin on hold         VTE prophylaxis: SCDs/TEDs and enoxaparin ppx    I have performed a physical exam and reviewed and updated ROS and Plan today (2/23/2023). In review of yesterday's note (2/22/2023), there are no changes except as documented above.    Total time:  52 minutes.  I spent greater than 50% of the time for patient care, counseling, and coordination on this date, including unit/floor time, and face-to-face time with the patient as per interval events and assessment and plan above

## 2023-02-24 LAB
BACTERIA WND AEROBE CULT: ABNORMAL
BACTERIA WND AEROBE CULT: ABNORMAL
ERYTHROCYTE [DISTWIDTH] IN BLOOD BY AUTOMATED COUNT: 46.9 FL (ref 35.9–50)
GRAM STN SPEC: ABNORMAL
HCT VFR BLD AUTO: 31.4 % (ref 42–52)
HGB BLD-MCNC: 10.5 G/DL (ref 14–18)
LACTATE SERPL-SCNC: 2.1 MMOL/L (ref 0.5–2)
MCH RBC QN AUTO: 31.8 PG (ref 27–33)
MCHC RBC AUTO-ENTMCNC: 33.4 G/DL (ref 33.7–35.3)
MCV RBC AUTO: 95.2 FL (ref 81.4–97.8)
PLATELET # BLD AUTO: 243 K/UL (ref 164–446)
PMV BLD AUTO: 11.2 FL (ref 9–12.9)
RBC # BLD AUTO: 3.3 M/UL (ref 4.7–6.1)
SIGNIFICANT IND 70042: ABNORMAL
SITE SITE: ABNORMAL
SOURCE SOURCE: ABNORMAL
WBC # BLD AUTO: 14.3 K/UL (ref 4.8–10.8)

## 2023-02-24 PROCEDURE — 94760 N-INVAS EAR/PLS OXIMETRY 1: CPT

## 2023-02-24 PROCEDURE — 700102 HCHG RX REV CODE 250 W/ 637 OVERRIDE(OP): Performed by: NURSE PRACTITIONER

## 2023-02-24 PROCEDURE — 700102 HCHG RX REV CODE 250 W/ 637 OVERRIDE(OP): Performed by: HOSPITALIST

## 2023-02-24 PROCEDURE — A9270 NON-COVERED ITEM OR SERVICE: HCPCS | Performed by: HOSPITALIST

## 2023-02-24 PROCEDURE — 97530 THERAPEUTIC ACTIVITIES: CPT

## 2023-02-24 PROCEDURE — 700111 HCHG RX REV CODE 636 W/ 250 OVERRIDE (IP): Performed by: INTERNAL MEDICINE

## 2023-02-24 PROCEDURE — 99233 SBSQ HOSP IP/OBS HIGH 50: CPT | Performed by: INTERNAL MEDICINE

## 2023-02-24 PROCEDURE — 97116 GAIT TRAINING THERAPY: CPT

## 2023-02-24 PROCEDURE — 770001 HCHG ROOM/CARE - MED/SURG/GYN PRIV*

## 2023-02-24 PROCEDURE — 85027 COMPLETE CBC AUTOMATED: CPT

## 2023-02-24 PROCEDURE — 700105 HCHG RX REV CODE 258: Performed by: NURSE PRACTITIONER

## 2023-02-24 PROCEDURE — 83605 ASSAY OF LACTIC ACID: CPT

## 2023-02-24 PROCEDURE — A9270 NON-COVERED ITEM OR SERVICE: HCPCS | Performed by: NURSE PRACTITIONER

## 2023-02-24 PROCEDURE — 700105 HCHG RX REV CODE 258: Performed by: INTERNAL MEDICINE

## 2023-02-24 PROCEDURE — 36415 COLL VENOUS BLD VENIPUNCTURE: CPT

## 2023-02-24 RX ADMIN — ACETAMINOPHEN 1000 MG: 500 TABLET ORAL at 17:22

## 2023-02-24 RX ADMIN — SIMVASTATIN 10 MG: 10 TABLET, FILM COATED ORAL at 06:05

## 2023-02-24 RX ADMIN — CEFTRIAXONE SODIUM 2000 MG: 2 INJECTION, POWDER, FOR SOLUTION INTRAMUSCULAR; INTRAVENOUS at 06:01

## 2023-02-24 RX ADMIN — DAKIN'S SOLUTION 0.125% (QUARTER STRENGTH) 473 ML: 0.12 SOLUTION at 06:07

## 2023-02-24 RX ADMIN — DAKIN'S SOLUTION 0.125% (QUARTER STRENGTH) 5 ML: 0.12 SOLUTION at 17:22

## 2023-02-24 RX ADMIN — SODIUM CHLORIDE: 9 INJECTION, SOLUTION INTRAVENOUS at 05:58

## 2023-02-24 ASSESSMENT — ENCOUNTER SYMPTOMS
BACK PAIN: 0
MYALGIAS: 1
DIARRHEA: 0
HEADACHES: 0
SHORTNESS OF BREATH: 0
VOMITING: 0
ABDOMINAL PAIN: 0
FEVER: 0
HALLUCINATIONS: 0
COUGH: 0
DIZZINESS: 0
HEARTBURN: 0
SORE THROAT: 0
CHILLS: 0
NAUSEA: 0
PALPITATIONS: 0
WEAKNESS: 0
DEPRESSION: 0
FOCAL WEAKNESS: 0
BLOOD IN STOOL: 0

## 2023-02-24 ASSESSMENT — GAIT ASSESSMENTS
DEVIATION: ANTALGIC;STEP TO;DECREASED HEEL STRIKE;DECREASED TOE OFF
GAIT LEVEL OF ASSIST: MINIMAL ASSIST
DISTANCE (FEET): 25
ASSISTIVE DEVICE: FRONT WHEEL WALKER

## 2023-02-24 ASSESSMENT — COGNITIVE AND FUNCTIONAL STATUS - GENERAL
MOBILITY SCORE: 12
SUGGESTED CMS G CODE MODIFIER MOBILITY: CL
MOVING TO AND FROM BED TO CHAIR: UNABLE
MOVING FROM LYING ON BACK TO SITTING ON SIDE OF FLAT BED: UNABLE
STANDING UP FROM CHAIR USING ARMS: A LITTLE
CLIMB 3 TO 5 STEPS WITH RAILING: TOTAL
TURNING FROM BACK TO SIDE WHILE IN FLAT BAD: A LITTLE
WALKING IN HOSPITAL ROOM: A LITTLE

## 2023-02-24 ASSESSMENT — PAIN DESCRIPTION - PAIN TYPE
TYPE: SURGICAL PAIN
TYPE: SURGICAL PAIN

## 2023-02-24 NOTE — PROGRESS NOTES
Received bedside report from night RN. Patient alert and oriented. Denies any complains at this time. Discussed plan of care and understood. Assessment per [facility]. Hemovac and Preveena in place. Falls precaution in place. Call light placed within reach. Will continue to monitor.     Critical lab value received from Lab - Dr. Noel informed.

## 2023-02-24 NOTE — CARE PLAN
The patient is Stable - Low risk of patient condition declining or worsening    Shift Goals  Clinical Goals: Afebrile throughout the night, pain less than 3/10  Patient Goals: Rest, pain control    Progress made toward(s) clinical / shift goals:  VS stable during the night, pain to LLE controlled with ice and Tylenol given for headache. Pt able to rest well.      Problem: Knowledge Deficit - Standard  Goal: Patient and family/care givers will demonstrate understanding of plan of care, disease process/condition, diagnostic tests and medications  Outcome: Progressing     Problem: Hemodynamics  Goal: Patient's hemodynamics, fluid balance and neurologic status will be stable or improve  Outcome: Progressing     Problem: Fluid Volume  Goal: Fluid volume balance will be maintained  Outcome: Progressing     Problem: Fall Risk  Goal: Patient will remain free from falls  Outcome: Progressing     Problem: Pain - Standard  Goal: Alleviation of pain or a reduction in pain to the patient’s comfort goal  Outcome: Progressing

## 2023-02-24 NOTE — CONSULTS
Patient is a 72-year-old with history of total knee arthroplasty on left 2-recovering well until sudden onset of profuse drainage.  She came to the ER, septic with fevers and leukocytosis.Now improved.  Blood cultures now positive for group A strep. She underwent I&D of the knee and OR culture also positive for group A strep.  Plan is for additional orthopedic procedure next week.     --- Continue ceftriaxone 2 grams every 24 hours   --- F/up repeat blood cx     ID will see pt in am    Silvia Cazares MD

## 2023-02-24 NOTE — PROGRESS NOTES
Cultures with positive ID of group A strep  Plan repeat I and D in am with poly exchange  Due to delays in getting appropriate surgical equipment paitent surgery will need to be delayed until next tuesday.     Conitdennis ABCELENA  General diet  Plan repeat i and D and poly exchange early next week.

## 2023-02-24 NOTE — PROGRESS NOTES
Received report from day shift RN. Pt A&Ox4. Resting in bed. C/o headache, Tylenol to be given. Reviewed plan of care for the night.

## 2023-02-24 NOTE — PROGRESS NOTES
Hospital Medicine Daily Progress Note    Date of Service  2/24/2023    Chief Complaint  Raymundo Jackson is a 72 y.o. male admitted 2/22/2023 with left knee pain and fever    Hospital Course  History of recent left total knee arthroplasty who has been recovering well until 3 days prior to presentation when he noted profuse drainage from his surgical wound followed by sudden onset severe pain with weightbearing.  He presented to the ER and was found to have sepsis secondary to septic arthritis and his prosthetic joint.  Orthopedic surgery was consulted and they recommended emergent I&D    Interval Problem Update  2/23: Tolerating antibiotics, cultures growing strep, discontinue vancomycin.  Lactate still high, 1 L fluid bolus repeated.  Blood pressure .  Blood Cx + for strep in 2 of 2 cx    2/24: ID rec continue ctx, Lactate improved to 2.1.  Pain improving.  Repeat blood cx drawn to eval if negative.  Planning repeat washout next week.    I have discussed this patient's plan of care and discharge plan at IDT rounds today with Case Management, Nursing, Nursing leadership, and other members of the IDT team.    Consultants/Specialty  orthopedics    Code Status  Full Code    Disposition  Patient is not medically cleared for discharge.   Anticipate discharge to to home with organized home healthcare and close outpatient follow-up.  I have placed the appropriate orders for post-discharge needs.    Review of Systems  Review of Systems   Constitutional:  Negative for chills, fever (Resolved) and malaise/fatigue.   HENT:  Negative for sore throat.    Respiratory:  Negative for cough and shortness of breath.    Cardiovascular:  Negative for chest pain and palpitations.   Gastrointestinal:  Negative for abdominal pain, blood in stool, diarrhea, heartburn, nausea and vomiting.   Genitourinary:  Negative for dysuria and frequency.   Musculoskeletal:  Positive for joint pain (Shooting pains, improving) and myalgias.  Negative for back pain.   Skin:         Finger wound, wrapped   Neurological:  Negative for dizziness, focal weakness, weakness and headaches.   Psychiatric/Behavioral:  Negative for depression and hallucinations.    All other systems reviewed and are negative.     Physical Exam  Temp:  [36.8 °C (98.2 °F)-37 °C (98.6 °F)] 36.8 °C (98.2 °F)  Pulse:  [65-94] 66  Resp:  [18-20] 18  BP: (120-154)/(71-94) 138/74  SpO2:  [93 %-94 %] 94 %    Physical Exam  Constitutional:       Appearance: Normal appearance.   HENT:      Head: Normocephalic and atraumatic.      Nose: Nose normal.      Mouth/Throat:      Mouth: Mucous membranes are moist.   Eyes:      Extraocular Movements: Extraocular movements intact.      Pupils: Pupils are equal, round, and reactive to light.   Cardiovascular:      Rate and Rhythm: Normal rate and regular rhythm.      Heart sounds: No murmur heard.  Pulmonary:      Effort: Pulmonary effort is normal. No respiratory distress.      Breath sounds: Normal breath sounds. No stridor.   Abdominal:      General: Abdomen is flat. Bowel sounds are normal. There is no distension.      Palpations: Abdomen is soft.      Tenderness: There is no abdominal tenderness.   Musculoskeletal:         General: Swelling present. No tenderness or deformity.      Cervical back: Normal range of motion and neck supple.      Comments: Left lower leg has some erythema and swelling, wound VAC in place over left knee   Skin:     General: Skin is warm and dry.      Coloration: Skin is not pale.      Findings: Erythema present.      Comments: Finger wound wrapped   Neurological:      General: No focal deficit present.      Mental Status: He is alert and oriented to person, place, and time. Mental status is at baseline.   Psychiatric:         Mood and Affect: Mood normal.         Behavior: Behavior normal.         Thought Content: Thought content normal.       Fluids    Intake/Output Summary (Last 24 hours) at 2/24/2023 1341  Last data  filed at 2/24/2023 1000  Gross per 24 hour   Intake 2206.97 ml   Output 3150 ml   Net -943.03 ml         Laboratory  Recent Labs     02/22/23  0925 02/23/23  0809 02/24/23  0106   WBC 23.3* 19.0* 14.3*   RBC 4.52* 3.67* 3.30*   HEMOGLOBIN 14.5 11.7* 10.5*   HEMATOCRIT 42.7 36.0* 31.4*   MCV 94.5 98.1* 95.2   MCH 32.1 31.9 31.8   MCHC 34.0 32.5* 33.4*   RDW 45.6 48.5 46.9   PLATELETCT 313 231 243   MPV 10.7 10.9 11.2       Recent Labs     02/22/23  0925 02/23/23  0809   SODIUM 131* 134*   POTASSIUM 4.2 3.7   CHLORIDE 94* 100   CO2 25 25   GLUCOSE 194* 180*   BUN 21 20   CREATININE 1.09 0.93   CALCIUM 9.3 8.3*       Recent Labs     02/22/23  0925 02/22/23 2015   APTT 28.4  --    INR 1.22* 1.33*                 Imaging  DX-CHEST-PORTABLE (1 VIEW)   Final Result      1.  No acute cardiopulmonary abnormality identified.      2.  Enlarged cardiac silhouette      DX-KNEE 2- LEFT   Final Result         1. Moderate joint effusion, new since prior.             Assessment/Plan  * Infection of prosthetic knee joint, initial encounter ([facility])- (present on admission)  Assessment & Plan  Presented acutely with sepsis secondary to left total knee infection  Surgical washout done 2/22 and he had marked improvement of symptoms after this  Per Ortho Will need repeat washout, next week due to equipment availability  Continue empiric ceftriaxone  DC vnaco  Discussed with ID  Follow surgical cultures which are now showing strep, sensitivities pending  Blood cultures are now showing strep, sensitivities pending  Will need 6 weeks of IV antibiotics from date of negative blood culture and last surgery which ever is later      Streptococcal bacteremia  Assessment & Plan  Blood cultures from 2/22 returned positive in 2 of 2 cultures with strep continue IV ceftriaxone  Discussed with ID  Blood cultures need to be negative for 72 hours prior to PICC line placement  Repeat blood cultures on 2/24 NGTD  Possible echo, wait for ID recs    Finger  wound, simple, open- (present on admission)  Assessment & Plan  Does not currently look infected  Dressing changes    Septic arthritis (HCC)- (present on admission)  Assessment & Plan  Secondary to recent total knee which was done in early February by Dr. James  S/P washout 2/22 and repeat planned for next week  Planning for 6 weeks IV  Wait for PICC line after blood cultures are negative x72 hours  Continue empiric antibiotics for now  DC vancomycin  Discussed with ID    Sepsis (HCC)  Assessment & Plan  He had profound lactic acidosis due to septic left knee  Status post washout for source control on 2/22, repeat washout planned for next week  Continue empiric antibiotics  Blood and L knee cultures so far with group A strep    Dyslipidemia- (present on admission)  Assessment & Plan  Continue home ASA and Zocor    Prediabetes  Assessment & Plan  Prediabetic A1c 6.3  Metformin on hold         VTE prophylaxis: SCDs/TEDs and enoxaparin ppx    I have performed a physical exam and reviewed and updated ROS and Plan today (2/24/2023). In review of yesterday's note (2/23/2023), there are no changes except as documented above.    Total time:  52 minutes.  I spent greater than 50% of the time for patient care, counseling, and coordination on this date, including unit/floor time, and face-to-face time with the patient as per interval events and assessment and plan above

## 2023-02-24 NOTE — DISCHARGE PLANNING
"[facility] TCN chart review completed. Collaborated with [facility]. Current discharge considerations are home with likely home infusion and home health nursing. Patient seen at bedside and HH choice obtained.  Per MD note on 2/24, \"Plan repeat i and D and poly exchange early next week.\"  No further TCN needs.    TCN will continue to follow and collaborate with discharge planning team as additional post acute needs arise. Thank you.    Completed:  PT recommends OP PT - 2/23  OT recommends home with no further needs - 2/23  HH Choice obtained   Nursing - 2/24  [facility] referral sent - 2/23/2023    "

## 2023-02-24 NOTE — CARE PLAN
The patient is Stable - Low risk of patient condition declining or worsening    Shift Goals  Clinical Goals: Monitor lactic levels; Pain contol 3/10 or less  Patient Goals: Rest; Pain control 3/10 or less    Progress made toward(s) clinical / shift goals:  Pain controlled 3/10 or less    Patient is not progressing towards the following goals:

## 2023-02-24 NOTE — THERAPY
Occupational Therapy   Initial Evaluation     Patient Name: Raymundo Jackson  Age:  72 y.o., Sex:  male  Medical Record #: [id number]  Today's Date: 2/23/2023     Precautions: Fall Risk, Weight Bearing As Tolerated Left Lower Extremity  Comments: (P) hemovac    Assessment  Patient is 72 y.o. male admitted with L knee pain, fever. Diagnosis of L knee infection. Recent L TKA early 2/23. S/p L knee I&D POD 1. A&Ox3, [place]. Hemovac in place. Agreeable to activity. Pt requires v/c's to be repeated several times throughout session. Performs sup to sit with CGA; use of sheet as LE . LB dressing with [place]. Walks to br, sink, chair with FWW, CGA. RTS placed on toilet; pt performs toileting with SBA. ADL transfers with CGA/Sakshi. Grooming/hygiene at sink with CGA. Education on home safety, DME/AE use during ADL's, fall prec's. Tolerates UIC post session; LE's elevated and ice in place. Lives with spouse who is able to assist if needed upon dc.                 Plan  Occupational Therapy Initial Treatment Plan   Treatment Interventions: (P) Self Care / Activities of Daily Living, Neuro Re-Education / Balance, Therapeutic Activity  Treatment Frequency: (P) 3 Times per Week  Duration: (P) Until Therapy Goals Met    DC Equipment Recommendations: None  Discharge Recommendations: Anticipate that the patient will have no further occupational therapy needs after discharge from the hospital     Subjective  Pleasant and cooperative.     Objective     02/23/23 1428   Prior Living Situation   Prior Services None   Housing / Facility 1 Story House   Steps Into Home 2   Steps In Home 0   Bathroom Set up Walk In Shower   Equipment Owned Front-Wheel Walker;Raised Toilet Seat Without Arms   Lives with - Patient's Self Care Capacity Spouse   Prior Level of ADL Function   Self Feeding Independent   Grooming / Hygiene Independent   Bathing Independent   Dressing Independent   Toileting Independent   Prior Level of IADL Function   Medication  Management Independent   Laundry Independent   Kitchen Mobility Independent   Finances Independent   Home Management Independent   Shopping Independent   Prior Level Of Mobility Independent Without Device in Home   Driving / Transportation Driving Independent   Occupation (Pre-Hospital Vocational) Retired Due To Age   Leisure Interests Hobbies   History of Falls   History of Falls No   Precautions   Precautions Fall Risk;Weight Bearing As Tolerated Left Lower Extremity   Comments hemovac   Vitals   O2 Delivery Device None - Room Air   Pain 0 - 10 Group   Location Knee   Location Orientation Left   Therapist Pain Assessment Post Activity Pain Same as Prior to Activity;Nurse Notified;4   Cognition    Cognition / Consciousness WDL   Comments [place]   Passive ROM Upper Body   Passive ROM Upper Body WDL   Active ROM Upper Body   Active ROM Upper Body  WDL   Strength Upper Body   Upper Body Strength  WDL   Sensation Upper Body   Upper Extremity Sensation  Not Tested   Upper Body Muscle Tone   Upper Body Muscle Tone  WDL   Neurological Concerns   Neurological Concerns No   Coordination Upper Body   Coordination WDL   Balance Assessment   Sitting Balance (Static) Good   Sitting Balance (Dynamic) Fair +   Standing Balance (Static) Fair +   Standing Balance (Dynamic) Fair   Weight Shift Sitting Good   Weight Shift Standing Fair   Bed Mobility    Supine to Sit Contact Guard Assist   Scooting Supervised   ADL Assessment   Eating Independent   Grooming Standby Assist;Standing   Upper Body Dressing Supervision   Lower Body Dressing Moderate Assist   Toileting Contact Guard Assist   How much help from another person does the patient currently need...   Putting on and taking off regular lower body clothing? 3   Bathing (including washing, rinsing, and drying)? 3   Toileting, which includes using a toilet, bedpan, or urinal? 4   Putting on and taking off regular upper body clothing? 4   Taking care of personal grooming such as brushing  "teeth? 4   Eating meals? 4   6 Clicks Daily Activity Score 22   Functional Mobility   Sit to Stand Contact Guard Assist   Bed, Chair, Wheelchair Transfer Contact Guard Assist   Toilet Transfers Contact Guard Assist   Transfer Method Stand Step   Visual Perception   Visual Perception  WDL   Activity Tolerance   Sitting in Chair 1hr   Sitting Edge of Bed 14\"   Standing 3x6\"   Patient / Family Goals   Patient / Family Goal #1 home   Short Term Goals   Short Term Goal # 1 Pt will perform LB dressing with SBA, AE as needed, within 5 days   Short Term Goal # 2 Pt will perform ADL transfers with Spv/Mod Indep within 5 days   Short Term Goal # 3 Pt will tolerate UIC for 3/3 meals each day.   Education Group   Education Provided Home Safety;Transfers;Activities of Daily Living;Adaptive Equipment   Role of Occupational Therapist Patient Response Patient;Acceptance;Explanation;Verbal Demonstration;Family   Home Safety Patient Response Patient;Acceptance;Explanation;Verbal Demonstration   Transfers Patient Response Patient;Acceptance;Explanation;Action Demonstration   ADL Patient Response Patient;Acceptance;Explanation;Action Demonstration   Adaptive Equipment Patient Response Patient;Acceptance;Explanation;Verbal Demonstration   Occupational Therapy Initial Treatment Plan    Treatment Interventions Self Care / Activities of Daily Living;Neuro Re-Education / Balance;Therapeutic Activity   Treatment Frequency 3 Times per Week   Duration Until Therapy Goals Met   Problem List   Problem List Decreased Active Daily Living Skills;Decreased Homemaking Skills;Decreased Functional Mobility;Decreased Activity Tolerance;Impaired Postural Control / Balance   Anticipated Discharge Equipment and Recommendations   DC Equipment Recommendations None   Discharge Recommendations Anticipate that the patient will have no further occupational therapy needs after discharge from the hospital                 "

## 2023-02-25 LAB
ALBUMIN SERPL BCP-MCNC: 2.2 G/DL (ref 3.2–4.9)
BACTERIA BLD CULT: ABNORMAL
BACTERIA TISS AEROBE CULT: ABNORMAL
BACTERIA TISS AEROBE CULT: ABNORMAL
BACTERIA WND AEROBE CULT: ABNORMAL
BASOPHILS # BLD AUTO: 0.5 % (ref 0–1.8)
BASOPHILS # BLD: 0.06 K/UL (ref 0–0.12)
BUN SERPL-MCNC: 17 MG/DL (ref 8–22)
CALCIUM ALBUM COR SERPL-MCNC: 9.3 MG/DL (ref 8.5–10.5)
CALCIUM SERPL-MCNC: 7.9 MG/DL (ref 8.4–10.2)
CHLORIDE SERPL-SCNC: 106 MMOL/L (ref 96–112)
CO2 SERPL-SCNC: 22 MMOL/L (ref 20–33)
CREAT SERPL-MCNC: 0.85 MG/DL (ref 0.5–1.4)
EOSINOPHIL # BLD AUTO: 0.17 K/UL (ref 0–0.51)
EOSINOPHIL NFR BLD: 1.3 % (ref 0–6.9)
ERYTHROCYTE [DISTWIDTH] IN BLOOD BY AUTOMATED COUNT: 45.8 FL (ref 35.9–50)
GFR SERPLBLD CREATININE-BSD FMLA CKD-EPI: 91 ML/MIN/1.73 M 2
GLUCOSE SERPL-MCNC: 146 MG/DL (ref 65–99)
GRAM STN SPEC: ABNORMAL
HCT VFR BLD AUTO: 31.2 % (ref 42–52)
HGB BLD-MCNC: 10.4 G/DL (ref 14–18)
IMM GRANULOCYTES # BLD AUTO: 0.33 K/UL (ref 0–0.11)
IMM GRANULOCYTES NFR BLD AUTO: 2.6 % (ref 0–0.9)
LYMPHOCYTES # BLD AUTO: 1.21 K/UL (ref 1–4.8)
LYMPHOCYTES NFR BLD: 9.5 % (ref 22–41)
MAGNESIUM SERPL-MCNC: 2 MG/DL (ref 1.5–2.5)
MCH RBC QN AUTO: 31.8 PG (ref 27–33)
MCHC RBC AUTO-ENTMCNC: 33.3 G/DL (ref 33.7–35.3)
MCV RBC AUTO: 95.4 FL (ref 81.4–97.8)
MONOCYTES # BLD AUTO: 1.22 K/UL (ref 0–0.85)
MONOCYTES NFR BLD AUTO: 9.5 % (ref 0–13.4)
NEUTROPHILS # BLD AUTO: 9.79 K/UL (ref 1.82–7.42)
NEUTROPHILS NFR BLD: 76.6 % (ref 44–72)
NRBC # BLD AUTO: 0 K/UL
NRBC BLD-RTO: 0 /100 WBC
PHOSPHATE SERPL-MCNC: 2.3 MG/DL (ref 2.5–4.5)
PLATELET # BLD AUTO: 263 K/UL (ref 164–446)
PMV BLD AUTO: 11.5 FL (ref 9–12.9)
POTASSIUM SERPL-SCNC: 3.6 MMOL/L (ref 3.6–5.5)
RBC # BLD AUTO: 3.27 M/UL (ref 4.7–6.1)
SIGNIFICANT IND 70042: ABNORMAL
SITE SITE: ABNORMAL
SODIUM SERPL-SCNC: 137 MMOL/L (ref 135–145)
SOURCE SOURCE: ABNORMAL
WBC # BLD AUTO: 12.8 K/UL (ref 4.8–10.8)

## 2023-02-25 PROCEDURE — 99223 1ST HOSP IP/OBS HIGH 75: CPT | Performed by: INTERNAL MEDICINE

## 2023-02-25 PROCEDURE — 700111 HCHG RX REV CODE 636 W/ 250 OVERRIDE (IP): Performed by: NURSE PRACTITIONER

## 2023-02-25 PROCEDURE — 99233 SBSQ HOSP IP/OBS HIGH 50: CPT | Performed by: INTERNAL MEDICINE

## 2023-02-25 PROCEDURE — 700105 HCHG RX REV CODE 258: Performed by: NURSE PRACTITIONER

## 2023-02-25 PROCEDURE — A9270 NON-COVERED ITEM OR SERVICE: HCPCS | Performed by: INTERNAL MEDICINE

## 2023-02-25 PROCEDURE — A9270 NON-COVERED ITEM OR SERVICE: HCPCS | Performed by: NURSE PRACTITIONER

## 2023-02-25 PROCEDURE — 80069 RENAL FUNCTION PANEL: CPT

## 2023-02-25 PROCEDURE — 700111 HCHG RX REV CODE 636 W/ 250 OVERRIDE (IP): Performed by: INTERNAL MEDICINE

## 2023-02-25 PROCEDURE — 700105 HCHG RX REV CODE 258: Performed by: INTERNAL MEDICINE

## 2023-02-25 PROCEDURE — 85025 COMPLETE CBC W/AUTO DIFF WBC: CPT

## 2023-02-25 PROCEDURE — 36415 COLL VENOUS BLD VENIPUNCTURE: CPT

## 2023-02-25 PROCEDURE — 94760 N-INVAS EAR/PLS OXIMETRY 1: CPT

## 2023-02-25 PROCEDURE — 700102 HCHG RX REV CODE 250 W/ 637 OVERRIDE(OP): Performed by: INTERNAL MEDICINE

## 2023-02-25 PROCEDURE — 83735 ASSAY OF MAGNESIUM: CPT

## 2023-02-25 PROCEDURE — 770001 HCHG ROOM/CARE - MED/SURG/GYN PRIV*

## 2023-02-25 PROCEDURE — 700102 HCHG RX REV CODE 250 W/ 637 OVERRIDE(OP): Performed by: NURSE PRACTITIONER

## 2023-02-25 RX ORDER — GABAPENTIN 100 MG/1
100 CAPSULE ORAL 3 TIMES DAILY
Status: DISCONTINUED | OUTPATIENT
Start: 2023-02-25 | End: 2023-03-02 | Stop reason: HOSPADM

## 2023-02-25 RX ORDER — TRAMADOL HYDROCHLORIDE 50 MG/1
50 TABLET ORAL EVERY 6 HOURS PRN
Status: DISCONTINUED | OUTPATIENT
Start: 2023-02-25 | End: 2023-03-02 | Stop reason: HOSPADM

## 2023-02-25 RX ADMIN — ENOXAPARIN SODIUM 40 MG: 40 INJECTION SUBCUTANEOUS at 17:24

## 2023-02-25 RX ADMIN — SODIUM CHLORIDE: 9 INJECTION, SOLUTION INTRAVENOUS at 22:00

## 2023-02-25 RX ADMIN — AMPICILLIN AND SULBACTAM 3 G: 1; 2 INJECTION, POWDER, FOR SOLUTION INTRAMUSCULAR; INTRAVENOUS at 23:50

## 2023-02-25 RX ADMIN — SODIUM CHLORIDE: 9 INJECTION, SOLUTION INTRAVENOUS at 01:41

## 2023-02-25 RX ADMIN — SIMVASTATIN 10 MG: 10 TABLET, FILM COATED ORAL at 05:00

## 2023-02-25 RX ADMIN — DAKIN'S SOLUTION 0.125% (QUARTER STRENGTH) 5 ML: 0.12 SOLUTION at 17:26

## 2023-02-25 RX ADMIN — CEFTRIAXONE SODIUM 2000 MG: 2 INJECTION, POWDER, FOR SOLUTION INTRAMUSCULAR; INTRAVENOUS at 05:02

## 2023-02-25 RX ADMIN — DAKIN'S SOLUTION 0.125% (QUARTER STRENGTH) 1 ML: 0.12 SOLUTION at 05:00

## 2023-02-25 RX ADMIN — AMPICILLIN AND SULBACTAM 3 G: 1; 2 INJECTION, POWDER, FOR SOLUTION INTRAMUSCULAR; INTRAVENOUS at 11:34

## 2023-02-25 RX ADMIN — TRAMADOL HYDROCHLORIDE 50 MG: 50 TABLET, COATED ORAL at 20:48

## 2023-02-25 RX ADMIN — HYDROMORPHONE HYDROCHLORIDE 0.5 MG: 1 INJECTION, SOLUTION INTRAMUSCULAR; INTRAVENOUS; SUBCUTANEOUS at 09:43

## 2023-02-25 RX ADMIN — AMPICILLIN AND SULBACTAM 3 G: 1; 2 INJECTION, POWDER, FOR SOLUTION INTRAMUSCULAR; INTRAVENOUS at 17:25

## 2023-02-25 RX ADMIN — HYDROMORPHONE HYDROCHLORIDE 0.5 MG: 1 INJECTION, SOLUTION INTRAMUSCULAR; INTRAVENOUS; SUBCUTANEOUS at 14:03

## 2023-02-25 ASSESSMENT — ENCOUNTER SYMPTOMS
FOCAL WEAKNESS: 0
NAUSEA: 0
BACK PAIN: 0
PALPITATIONS: 0
HEADACHES: 0
HALLUCINATIONS: 0
CHILLS: 0
VOMITING: 0
FEVER: 0
WEAKNESS: 0
DIARRHEA: 0
ABDOMINAL PAIN: 0
COUGH: 0
BLOOD IN STOOL: 0
SORE THROAT: 0
MYALGIAS: 1
SHORTNESS OF BREATH: 0
DEPRESSION: 0
DIZZINESS: 0
HEARTBURN: 0

## 2023-02-25 ASSESSMENT — PAIN DESCRIPTION - PAIN TYPE
TYPE: SURGICAL PAIN

## 2023-02-25 NOTE — CARE PLAN
The patient is Stable - Low risk of patient condition declining or worsening    Shift Goals  Clinical Goals: Patient will report pain 3/10 or less this shift  Patient Goals: Rest comfortably    Progress made toward(s) clinical / shift goals:  Patient reports pain to be 3/10 or less this shift, resting overnight.     Patient is not progressing towards the following goals: N/A

## 2023-02-25 NOTE — CONSULTS
"[facility] INFECTIOUS DISEASES INPATIENT CONSULT NOTE     Date of Service: 2/25/2023    Consult Requested By: Silvia Noel D.O.    Reason for Consultation: Bacteremia and infected knee    Chief Complaint: Drainage from knee    History of Present Illness:     Raymundo Jackson is a 72 y.o. male admitted 2/22/2023.  Patient with left knee degenerative joint disease who underwent left total knee arthroplasty on 2/1/2023, he was recovering well till about a day prior to admission when he began to experience drainage from the surgical site.  He was febrile to 105.4 and had neutrophilic leukocytosis of 23.3.  He was started on vancomycin and ceftriaxone, white count has trended down.  Orthopedics was consulted, underwent I&D on 2/22/2023.  OR cultures growing group A Strep and 2 out of 2 blood cultures also growing the same organism.  Repeat blood cultures x2 from 2/22 no growth to date.  Plan is for return to the OR on Tuesday for I&D with poly exchange.    About 7 to 8 days ago, patient had a right middle finger infection with surrounding erythema and swelling, treated by washing and wrapping.  Improved without antibiotics.    Review of Systems:  All other systems reviewed and are negative expect as noted in HPI    Past Medical History:   Diagnosis Date    Arthritis     Awareness under anesthesia     \"woke up too soon\"    Dental disorder     top and bottom partials/caps    Diabetes (HCC)     Disorder of thyroid     overactive thyroid untreated    Hemorrhagic disorder (HCC)     frequent nose bleeds left nare    Hepatitis C     High cholesterol     Pain     BL. thumbs. bilateral knees       Past Surgical History:   Procedure Laterality Date    IRRIGATION & DEBRIDEMENT GENERAL Left 2/22/2023    Procedure: IRRIGATION AND DEBRIDEMENT, WOUND;  Surgeon: Brodie Del Valle M.D.;  Location: SURGERY [facility];  Service: Orthopedics    PB TOTAL KNEE ARTHROPLASTY Left 2/1/2023    Procedure: LEFT TOTAL KNEE ARTHROPLASTY;  " Surgeon: Brodie Del Valle M.D.;  Location: SURGERY [facility];  Service: Orthopedics    PB TOTAL KNEE ARTHROPLASTY Right 09/07/2022    Procedure: RIGHT KNEE TOTAL ARTHROPLASTY;  Surgeon: Brodie Del Valle M.D.;  Location: SURGERY [facility];  Service: Orthopedics    KNEE ARTHROSCOPY Right     KNEE ARTHROSCOPY Right     TONSILLECTOMY         History reviewed. No pertinent family history.    Social History     Socioeconomic History    Marital status:      Spouse name: Not on file    Number of children: Not on file    Years of education: Not on file    Highest education level: Not on file   Occupational History    Not on file   Tobacco Use    Smoking status: Never    Smokeless tobacco: Never   Vaping Use    Vaping Use: Never used   Substance and Sexual Activity    Alcohol use: Never    Drug use: Never    Sexual activity: Not on file   Other Topics Concern    Not on file   Social History Narrative    Not on file     Social Determinants of Health     Financial Resource Strain: Not on file   Food Insecurity: Not on file   Transportation Needs: Not on file   Physical Activity: Not on file   Stress: Not on file   Social Connections: Not on file   Intimate Partner Violence: Not on file   Housing Stability: Not on file       Allergies   Allergen Reactions    Oxycodone Unspecified     Sees double and triple vision        Medications:    Current Facility-Administered Medications:     acetaminophen (TYLENOL) tablet 1,000 mg, 1,000 mg, Oral, Q6HRS PRN, Mindy Casanova M.D., 1,000 mg at 02/24/23 1722    dakins 0.125% (1/4 strength) topical soln, , Topical, BID, Silvia Noel D.O., 1 mL at 02/25/23 0500    enoxaparin (Lovenox) inj 40 mg, 40 mg, Subcutaneous, DAILY AT 1800, Silvia Noel D.O., 40 mg at 02/23/23 1743    cefTRIAXone (Rocephin) 2,000 mg in  mL IVPB, 2,000 mg, Intravenous, Q24HRS, HASEEB CarterO., Stopped at 02/25/23 0532    aspirin (ASA) tablet 650 mg, 650 mg, Oral,  Q6HRS PRN, IWONA LambP.R.N.    simvastatin (ZOCOR) tablet 10 mg, 10 mg, Oral, DAILY, IWONA LambP.R.N., 10 mg at 23 0500    HYDROmorphone (Dilaudid) injection 0.5 mg, 0.5 mg, Intravenous, Q2HRS PRN, IWONA LambP.R.N., 0.5 mg at 23 0943    oxyCODONE-acetaminophen (PERCOCET) 5-325 MG per tablet 1 Tablet, 1 Tablet, Oral, Q4HRS PRN, JOAQUIN Lamb.P.R.N.    lactated ringers infusion (BOLUS), 1,000 mL, Intravenous, Once PRN, IWONA LambP.R.N.    NS infusion, , Intravenous, Continuous, IWONA LambP.R.N., Last Rate: 100 mL/hr at 23 0141, New Bag at 23 0141    Physical Exam:   Vital Signs: /74   Pulse (!) 59   Temp 36.8 °C (98.3 °F) (Oral)   Resp 20   Ht 1.829 m (6')   Wt 85.7 kg (189 lb)   SpO2 97%   BMI 25.63 kg/m²   Temp  Av.6 °C (99.7 °F)  Min: 36.4 °C (97.6 °F)  Max: 40.8 °C (105.4 °F)  Vital signs reviewed  Constitutional: Patient is oriented to person, place, and time. Appears well-developed and well-nourished. No distress  Head: Atraumatic, normocephalic  Eyes: Conjunctivae normal, EOM intact. Pupils are equal, round, and reactive to light.   Mouth/Throat: Lips without lesions, good dentition, oropharynx is clear and moist.  Neck: Neck supple. No masses/lymphadenopathy  Cardiovascular: Normal rate, regular rhythm, normal S1S2 and intact distal pulses. No murmur, gallop, or friction rub. No pedal edema.  Pulmonary/Chest: No respiratory distress. Unlabored respiratory effort, lungs clear to auscultation. No wheezes or rales.   Abdominal: Soft, non tender. BS + x 4. No masses or hepatosplenomegaly.   Musculoskeletal: Left middle finger in a dry dressing did not remove is adhered to the scab, will soak before removing knee with surgical dressing in place, right leg with edema  Neurological: Alert and oriented to person, place, and time. No gross cranial nerve deficit. No focal neural deficit noted  Skin: Skin is warm  and dry. No rashes or embolic phenomena noted on exposed skin  Psychiatric: Normal mood and affect. Behavior is normal.     LABS:  Recent Labs     02/23/23  0809 02/24/23  0106 02/25/23  0147   WBC 19.0* 14.3* 12.8*      Recent Labs     02/23/23  0809 02/24/23  0106 02/25/23  0147   HEMOGLOBIN 11.7* 10.5* 10.4*   HEMATOCRIT 36.0* 31.4* 31.2*   MCV 98.1* 95.2 95.4   MCH 31.9 31.8 31.8   PLATELETCT 231 243 263       Recent Labs     02/23/23  0809 02/25/23  0147   SODIUM 134* 137   POTASSIUM 3.7 3.6   CHLORIDE 100 106   CO2 25 22   CREATININE 0.93 0.85        Recent Labs     02/25/23  0147   ALBUMIN 2.2*        MICRO:  No results found for: BLOODCULTU, BLDCULT, BCHOLD     Latest pertinent labs were reviewed    IMAGING STUDIES:  As above    Hospital Course/Assessment:   Raymundo Jackson is a 72 y.o. male patient admitted with group A strep bacteremia from infected left prosthetic knee joint.  Possible source is right middle finger infection    Pertinent Diagnoses:  Sepsis, improved  Group A Strep bacteremia  Left knee prosthetic joint infection    Plan:   -Will move from ceftriaxone to IV Unasyn for decreased C. difficile risk  -Follow repeat blood cultures x2  -Plan is for repeat I&D with poly exchange on Tuesday.  We will follow along    Plan was discussed with the primary team, Dr. Noel    Disposition: Unable to determine at this time  Need for PICC line: Yes but recommend waiting after Tuesday's surgery to avoid seeding    ID will follow.     Elgin Page M.D.    Please note that this dictation was created using voice recognition software. I have worked with technical experts from [facility] to optimize the interface.  I have made every reasonable attempt to correct obvious errors, but there may be errors of grammar and possibly content that I did not discover before finalizing the note.

## 2023-02-25 NOTE — CARE PLAN
The patient is Stable - Low risk of patient condition declining or worsening    Shift Goals  Clinical Goals: Pain control 3/10 or less; Monitor Hemovac output  Patient Goals: Rest; Pain control 3/10 or less    Progress made toward(s) clinical / shift goals:  Pain controlled 3/10 or less during this shift    Patient is not progressing towards the following goals:

## 2023-02-25 NOTE — PROGRESS NOTES
Hospital Medicine Daily Progress Note    Date of Service  2/25/2023    Chief Complaint  Raymundo Jackson is a 72 y.o. male admitted 2/22/2023 with left knee pain and fever    Hospital Course  History of recent left total knee arthroplasty who has been recovering well until 3 days prior to presentation when he noted profuse drainage from his surgical wound followed by sudden onset severe pain with weightbearing.  He presented to the ER and was found to have sepsis secondary to septic arthritis and his prosthetic joint.  Orthopedic surgery was consulted and they recommended emergent I&D    Interval Problem Update  2/23: Tolerating antibiotics, cultures growing strep, for possible discontinue vancomycin.  Lactate still high, 1 L fluid bolus repeated.  Blood pressure .  Blood Cx + for strep in 2 of 2 cx    2/24: ID rec continue ctx, Lactate improved to 2.1.  Pain improving.  Repeat blood cx drawn to eval if negative.  Planning repeat washout next week.    2/25: WBC improving change pain meds to tramadol.  Add gabapentin.  Discussed with ID, change antibiotics to Unasyn for less C. difficile risk.  Repeat blood cultures are negative so far.    I have discussed this patient's plan of care and discharge plan at IDT rounds today with Case Management, Nursing, Nursing leadership, and other members of the IDT team.    Consultants/Specialty  orthopedics    Code Status  Full Code    Disposition  Patient is not medically cleared for discharge.   Anticipate discharge to to home with organized home healthcare and close outpatient follow-up.  I have placed the appropriate orders for post-discharge needs.    Review of Systems  Review of Systems   Constitutional:  Negative for chills, fever (Resolved) and malaise/fatigue.   HENT:  Negative for sore throat.    Respiratory:  Negative for cough and shortness of breath.    Cardiovascular:  Negative for chest pain and palpitations.   Gastrointestinal:  Negative for abdominal pain,  blood in stool, diarrhea, heartburn, nausea and vomiting.   Genitourinary:  Negative for dysuria and frequency.   Musculoskeletal:  Positive for joint pain (Shooting pains, improving) and myalgias. Negative for back pain.   Skin:         Finger wound, wrapped   Neurological:  Negative for dizziness, focal weakness, weakness and headaches.   Psychiatric/Behavioral:  Negative for depression and hallucinations.    All other systems reviewed and are negative.     Physical Exam  Temp:  [36.8 °C (98.3 °F)-37.1 °C (98.8 °F)] 36.8 °C (98.3 °F)  Pulse:  [59-75] 59  Resp:  [16-20] 20  BP: (123-140)/(69-79) 123/74  SpO2:  [93 %-97 %] 97 %    Physical Exam  Constitutional:       Appearance: Normal appearance.   HENT:      Head: Normocephalic and atraumatic.      Nose: Nose normal.      Mouth/Throat:      Mouth: Mucous membranes are moist.   Eyes:      Extraocular Movements: Extraocular movements intact.      Pupils: Pupils are equal, round, and reactive to light.   Cardiovascular:      Rate and Rhythm: Normal rate and regular rhythm.      Heart sounds: No murmur heard.  Pulmonary:      Effort: Pulmonary effort is normal. No respiratory distress.      Breath sounds: Normal breath sounds. No stridor.   Abdominal:      General: Abdomen is flat. Bowel sounds are normal. There is no distension.      Palpations: Abdomen is soft.      Tenderness: There is no abdominal tenderness.   Musculoskeletal:         General: Swelling present. No tenderness or deformity.      Cervical back: Normal range of motion and neck supple.      Comments: Left lower leg has some erythema and swelling, wound VAC in place over left knee   Skin:     General: Skin is warm and dry.      Coloration: Skin is not pale.      Findings: Erythema present.      Comments: Finger wound wrapped   Neurological:      General: No focal deficit present.      Mental Status: He is alert and oriented to person, place, and time. Mental status is at baseline.   Psychiatric:          Mood and Affect: Mood normal.         Behavior: Behavior normal.         Thought Content: Thought content normal.       Fluids    Intake/Output Summary (Last 24 hours) at 2/25/2023 1413  Last data filed at 2/25/2023 1322  Gross per 24 hour   Intake 600 ml   Output 3450 ml   Net -2850 ml         Laboratory  Recent Labs     02/23/23  0809 02/24/23  0106 02/25/23  0147   WBC 19.0* 14.3* 12.8*   RBC 3.67* 3.30* 3.27*   HEMOGLOBIN 11.7* 10.5* 10.4*   HEMATOCRIT 36.0* 31.4* 31.2*   MCV 98.1* 95.2 95.4   MCH 31.9 31.8 31.8   MCHC 32.5* 33.4* 33.3*   RDW 48.5 46.9 45.8   PLATELETCT 231 243 263   MPV 10.9 11.2 11.5       Recent Labs     02/23/23  0809 02/25/23  0147   SODIUM 134* 137   POTASSIUM 3.7 3.6   CHLORIDE 100 106   CO2 25 22   GLUCOSE 180* 146*   BUN 20 17   CREATININE 0.93 0.85   CALCIUM 8.3* 7.9*       Recent Labs     02/22/23 2015   INR 1.33*                 Imaging  DX-CHEST-PORTABLE (1 VIEW)   Final Result      1.  No acute cardiopulmonary abnormality identified.      2.  Enlarged cardiac silhouette      DX-KNEE 2- LEFT   Final Result         1. Moderate joint effusion, new since prior.             Assessment/Plan  * Infection of prosthetic knee joint, initial encounter ([facility])- (present on admission)  Assessment & Plan  Presented acutely with sepsis secondary to left total knee infection w strep bacteramia  Surgical washout 2/22  Per Ortho Will need repeat washout, next week due to equipment availability (Tuesday)  Change to Unasyn (lower C Dif)  Discussed with ID  L knee and blood cx 2/22 + Group A Strep  Will need 6 weeks of IV antibiotics from date of negative blood culture and last surgery which ever is later  Wait for PICC until after next surgery to avoid seeding      Streptococcal bacteremia  Assessment & Plan  Blood cultures from 2/22 returned positive in 2 of 2 cultures with strep continue IV ceftriaxone  Discussed with ID  Repeat blood cultures on 2/24 NGTD    Finger wound, simple, open- (present on  admission)  Assessment & Plan  Does not currently look infected  Dressing changes    Septic arthritis (HCC)- (present on admission)  Assessment & Plan  Secondary to recent total knee which was done in early February by Dr. James  S/P washout 2/22 and repeat planned for next week  Planning for 6 weeks IV  Continue Unasyn  Discussed with ID  Add gabapentin and tramadol for pain, dc oxy    Sepsis (HCC)  Assessment & Plan  He had profound lactic acidosis due to septic left knee  Status post washout for source control on 2/22, repeat washout planned for next week  Continue empiric antibiotics  Blood and L knee cultures so far with group A strep    Dyslipidemia- (present on admission)  Assessment & Plan  Continue home ASA and Zocor    Prediabetes  Assessment & Plan  Prediabetic A1c 6.3  Metformin on hold         VTE prophylaxis: SCDs/TEDs and enoxaparin ppx    I have performed a physical exam and reviewed and updated ROS and Plan today (2/25/2023). In review of yesterday's note (2/24/2023), there are no changes except as documented above.    Total time:  52 minutes.  I spent greater than 50% of the time for patient care, counseling, and coordination on this date, including unit/floor time, and face-to-face time with the patient as per interval events and assessment and plan above

## 2023-02-25 NOTE — THERAPY
Physical Therapy   Daily Treatment     Patient Name: Raymundo Jackson  Age:  72 y.o., Sex:  male  Medical Record #: [id number]  Today's Date: 2/24/2023     Precautions  Precautions: Fall Risk;Weight Bearing As Tolerated Left Lower Extremity  Comments: hemovac    Assessment    Pt is progressing slower than expected today. Pt was primarily limited by a high pain level. Pt required increased assist with bed mobility, transfers, sit<>stands, and ambulation. Pt required Mod A for bed mobility, Min A for sit<>stand, and ambulation. Pt was only limited due about 25 ft of ambulation x 2. Pt was provided with assist during toilet transfer and required frequent VC, compensatory strategies, and sequencing for managing sit<>stands from a lower surface in order to control pain level. Pt requested to stay seated on EOB. RN aware of position and current visit. Pt plan is to have surgical intervention during acute setting stay, anticipate, pt to improve in mobility while in setting. Recommend outpatient physical therapy services to address higher level deficits.    Plan    Physical Therapy Treatment Plan  Physical Therapy Treatment Plan: Continue Current Treatment Plan    DC Equipment Recommendations: Front-Wheel Walker  Discharge Recommendations: Recommend outpatient physical therapy services to address higher level deficits    Objective       02/24/23 1625   Precautions   Precautions Fall Risk;Weight Bearing As Tolerated Left Lower Extremity   Comments hemovac   Pain 0 - 10 Group   Location Knee   Location Orientation Left   Description Aching   Therapist Pain Assessment During Activity;Prior to Activity;Post Activity;Nurse Notified;5   Cognition    Cognition / Consciousness WDL   Comments [place]   Neurological Concerns   Neurological Concerns No   Balance   Sitting Balance (Static) Fair +   Sitting Balance (Dynamic) Fair   Standing Balance (Static) Fair -   Standing Balance (Dynamic) Fair -   Weight Shift Sitting Fair   Weight Shift  Standing Poor   Skilled Intervention Verbal Cuing;Facilitation   Comments w/fww; poor WB tolerance due to pain   Bed Mobility    Supine to Sit Moderate Assist   Scooting Contact Guard Assist   Skilled Intervention Verbal Cuing;Facilitation   Comments provided with faciliation and VC to use B UE for bed mobility, requries significant assist due to increased pain at L LE   Gait Analysis   Gait Level Of Assist Minimal Assist   Assistive Device Front Wheel Walker   Distance (Feet) 25   # of Times Distance was Traveled 2   Deviation Antalgic;Step To;Decreased Heel Strike;Decreased Toe Off   Weight Bearing Status WBAT L LE   Skilled Intervention Verbal Cuing;Compensatory Strategies   Comments provided with VC for upright posture once standing and use of B UE to offload L LE and encourge step to pattern due to high pain level   Functional Mobility   Sit to Stand Minimal Assist   Bed, Chair, Wheelchair Transfer Contact Guard Assist   Toilet Transfers Minimal Assist   Transfer Method Stand Step   Mobility EOB, sit<>stand, ambulation, to toilet, back to bed at EOB   Skilled Intervention Verbal Cuing;Facilitation   Comments VC for hand placement and sequencing for kicking out L LE prior to sitting due to high pain level   How much difficulty does the patient currently have...   Turning over in bed (including adjusting bedclothes, sheets and blankets)? 3   Sitting down on and standing up from a chair with arms (e.g., wheelchair, bedside commode, etc.) 1   Moving from lying on back to sitting on the side of the bed? 1   How much help from another person does the patient currently need...   Moving to and from a bed to a chair (including a wheelchair)? 3   Need to walk in a hospital room? 3   Climbing 3-5 steps with a railing? 1   6 clicks Mobility Score 12   Activity Tolerance   Sitting in Chair 8 mins on toilet   Sitting Edge of Bed 10 mins   Standing 4 mins x 2   Comments limited by pain   Patient / Family Goals    Patient /  Family Goal #1 to go home   Short Term Goals    Short Term Goal # 1 pt will go supine<>sit w/hob flat and rails down w/spv in 6tx   Goal Outcome # 1 Progressing slower than expected   Short Term Goal # 2 pt will go sit<>stand w/fww w/spv in 6tx   Goal Outcome # 2 Progressing slower than expected   Short Term Goal # 3 pt will transfer bed<>chair w/fww w/spv in 6tx   Goal Outcome # 3 Progressing slower than expected   Short Term Goal # 4 pt will ambulate for 150ft w/fww w/spv in 6tx   Goal Outcome # 4 Progressing slower than expected   Short Term Goal # 5 pt will go up/down 2 steps w/spv in 6tx   Goal Outcome # 5 Goal not met   Education Group   Role of Physical Therapist Patient Response Patient;Acceptance;Explanation;Demonstration;Verbal Demonstration;Action Demonstration   Physical Therapy Treatment Plan   Physical Therapy Treatment Plan Continue Current Treatment Plan   Anticipated Discharge Equipment and Recommendations   DC Equipment Recommendations Front-Wheel Walker   Discharge Recommendations Recommend outpatient physical therapy services to address higher level deficits   Interdisciplinary Plan of Care Collaboration   IDT Collaboration with  Nursing   Patient Position at End of Therapy Edge of Bed;Call Light within Reach;Tray Table within Reach;Phone within Reach   Collaboration Comments aware of visit and recs   Session Information   Date / Session Number  2/24-2 (2/7, 3/1)   Priority 4

## 2023-02-26 ENCOUNTER — APPOINTMENT (OUTPATIENT)
Dept: RADIOLOGY | Facility: MEDICAL CENTER | Age: 73
DRG: 485 | End: 2023-02-26
Attending: INTERNAL MEDICINE
Payer: MEDICARE

## 2023-02-26 LAB
ALBUMIN SERPL BCP-MCNC: 2 G/DL (ref 3.2–4.9)
BACTERIA SPEC ANAEROBE CULT: NORMAL
BASOPHILS # BLD AUTO: 0 % (ref 0–1.8)
BASOPHILS # BLD: 0 K/UL (ref 0–0.12)
BUN SERPL-MCNC: 15 MG/DL (ref 8–22)
CALCIUM ALBUM COR SERPL-MCNC: 9.1 MG/DL (ref 8.5–10.5)
CALCIUM SERPL-MCNC: 7.5 MG/DL (ref 8.4–10.2)
CHLORIDE SERPL-SCNC: 106 MMOL/L (ref 96–112)
CO2 SERPL-SCNC: 22 MMOL/L (ref 20–33)
CREAT SERPL-MCNC: 0.75 MG/DL (ref 0.5–1.4)
EOSINOPHIL # BLD AUTO: 0.55 K/UL (ref 0–0.51)
EOSINOPHIL NFR BLD: 5 % (ref 0–6.9)
ERYTHROCYTE [DISTWIDTH] IN BLOOD BY AUTOMATED COUNT: 45.3 FL (ref 35.9–50)
GFR SERPLBLD CREATININE-BSD FMLA CKD-EPI: 95 ML/MIN/1.73 M 2
GLUCOSE SERPL-MCNC: 170 MG/DL (ref 65–99)
HCT VFR BLD AUTO: 29.9 % (ref 42–52)
HGB BLD-MCNC: 10 G/DL (ref 14–18)
LG PLATELETS BLD QL SMEAR: NORMAL
LYMPHOCYTES # BLD AUTO: 0.99 K/UL (ref 1–4.8)
LYMPHOCYTES NFR BLD: 9 % (ref 22–41)
MAGNESIUM SERPL-MCNC: 2 MG/DL (ref 1.5–2.5)
MANUAL DIFF BLD: NORMAL
MCH RBC QN AUTO: 31.4 PG (ref 27–33)
MCHC RBC AUTO-ENTMCNC: 33.4 G/DL (ref 33.7–35.3)
MCV RBC AUTO: 94 FL (ref 81.4–97.8)
METAMYELOCYTES NFR BLD MANUAL: 3 %
MONOCYTES # BLD AUTO: 1.43 K/UL (ref 0–0.85)
MONOCYTES NFR BLD AUTO: 13 % (ref 0–13.4)
NEUTROPHILS # BLD AUTO: 7.7 K/UL (ref 1.82–7.42)
NEUTROPHILS NFR BLD: 61 % (ref 44–72)
NEUTS BAND NFR BLD MANUAL: 9 % (ref 0–10)
NRBC # BLD AUTO: 0 K/UL
NRBC BLD-RTO: 0 /100 WBC
PHOSPHATE SERPL-MCNC: 3.2 MG/DL (ref 2.5–4.5)
PLATELET # BLD AUTO: 289 K/UL (ref 164–446)
PLATELET BLD QL SMEAR: NORMAL
PMV BLD AUTO: 11.2 FL (ref 9–12.9)
POLYCHROMASIA BLD QL SMEAR: NORMAL
POTASSIUM SERPL-SCNC: 3.6 MMOL/L (ref 3.6–5.5)
RBC # BLD AUTO: 3.18 M/UL (ref 4.7–6.1)
RBC BLD AUTO: PRESENT
SIGNIFICANT IND 70042: NORMAL
SITE SITE: NORMAL
SODIUM SERPL-SCNC: 137 MMOL/L (ref 135–145)
SOURCE SOURCE: NORMAL
TOXIC GRANULES BLD QL SMEAR: NORMAL
WBC # BLD AUTO: 11 K/UL (ref 4.8–10.8)

## 2023-02-26 PROCEDURE — 97116 GAIT TRAINING THERAPY: CPT

## 2023-02-26 PROCEDURE — 700111 HCHG RX REV CODE 636 W/ 250 OVERRIDE (IP): Performed by: NURSE PRACTITIONER

## 2023-02-26 PROCEDURE — 80069 RENAL FUNCTION PANEL: CPT

## 2023-02-26 PROCEDURE — 83735 ASSAY OF MAGNESIUM: CPT

## 2023-02-26 PROCEDURE — 700101 HCHG RX REV CODE 250: Performed by: INTERNAL MEDICINE

## 2023-02-26 PROCEDURE — 36415 COLL VENOUS BLD VENIPUNCTURE: CPT

## 2023-02-26 PROCEDURE — 700111 HCHG RX REV CODE 636 W/ 250 OVERRIDE (IP): Performed by: INTERNAL MEDICINE

## 2023-02-26 PROCEDURE — 99232 SBSQ HOSP IP/OBS MODERATE 35: CPT | Performed by: INTERNAL MEDICINE

## 2023-02-26 PROCEDURE — 94760 N-INVAS EAR/PLS OXIMETRY 1: CPT

## 2023-02-26 PROCEDURE — 700102 HCHG RX REV CODE 250 W/ 637 OVERRIDE(OP): Performed by: INTERNAL MEDICINE

## 2023-02-26 PROCEDURE — 97530 THERAPEUTIC ACTIVITIES: CPT

## 2023-02-26 PROCEDURE — 770001 HCHG ROOM/CARE - MED/SURG/GYN PRIV*

## 2023-02-26 PROCEDURE — A9270 NON-COVERED ITEM OR SERVICE: HCPCS | Performed by: HOSPITALIST

## 2023-02-26 PROCEDURE — 97110 THERAPEUTIC EXERCISES: CPT

## 2023-02-26 PROCEDURE — 700102 HCHG RX REV CODE 250 W/ 637 OVERRIDE(OP): Performed by: NURSE PRACTITIONER

## 2023-02-26 PROCEDURE — 700102 HCHG RX REV CODE 250 W/ 637 OVERRIDE(OP): Performed by: HOSPITALIST

## 2023-02-26 PROCEDURE — 85025 COMPLETE CBC W/AUTO DIFF WBC: CPT

## 2023-02-26 PROCEDURE — 700105 HCHG RX REV CODE 258: Performed by: INTERNAL MEDICINE

## 2023-02-26 PROCEDURE — 85007 BL SMEAR W/DIFF WBC COUNT: CPT

## 2023-02-26 PROCEDURE — A9270 NON-COVERED ITEM OR SERVICE: HCPCS | Performed by: NURSE PRACTITIONER

## 2023-02-26 PROCEDURE — A9270 NON-COVERED ITEM OR SERVICE: HCPCS | Performed by: INTERNAL MEDICINE

## 2023-02-26 RX ORDER — CLINDAMYCIN PHOSPHATE 900 MG/50ML
900 INJECTION, SOLUTION INTRAVENOUS EVERY 8 HOURS
Status: DISCONTINUED | OUTPATIENT
Start: 2023-02-26 | End: 2023-03-01

## 2023-02-26 RX ADMIN — ACETAMINOPHEN 1000 MG: 500 TABLET ORAL at 03:59

## 2023-02-26 RX ADMIN — AMPICILLIN AND SULBACTAM 3 G: 1; 2 INJECTION, POWDER, FOR SOLUTION INTRAMUSCULAR; INTRAVENOUS at 04:35

## 2023-02-26 RX ADMIN — GABAPENTIN 100 MG: 100 CAPSULE ORAL at 17:56

## 2023-02-26 RX ADMIN — ENOXAPARIN SODIUM 40 MG: 40 INJECTION SUBCUTANEOUS at 17:56

## 2023-02-26 RX ADMIN — AMPICILLIN AND SULBACTAM 3 G: 1; 2 INJECTION, POWDER, FOR SOLUTION INTRAMUSCULAR; INTRAVENOUS at 23:37

## 2023-02-26 RX ADMIN — CLINDAMYCIN PHOSPHATE 900 MG: 900 INJECTION, SOLUTION INTRAVENOUS at 16:29

## 2023-02-26 RX ADMIN — GABAPENTIN 100 MG: 100 CAPSULE ORAL at 11:08

## 2023-02-26 RX ADMIN — AMPICILLIN AND SULBACTAM 3 G: 1; 2 INJECTION, POWDER, FOR SOLUTION INTRAMUSCULAR; INTRAVENOUS at 11:14

## 2023-02-26 RX ADMIN — AMPICILLIN AND SULBACTAM 3 G: 1; 2 INJECTION, POWDER, FOR SOLUTION INTRAMUSCULAR; INTRAVENOUS at 18:22

## 2023-02-26 RX ADMIN — HYDROMORPHONE HYDROCHLORIDE 0.5 MG: 1 INJECTION, SOLUTION INTRAMUSCULAR; INTRAVENOUS; SUBCUTANEOUS at 11:08

## 2023-02-26 RX ADMIN — CLINDAMYCIN PHOSPHATE 900 MG: 900 INJECTION, SOLUTION INTRAVENOUS at 21:58

## 2023-02-26 RX ADMIN — GABAPENTIN 100 MG: 100 CAPSULE ORAL at 04:33

## 2023-02-26 RX ADMIN — DAKIN'S SOLUTION 0.125% (QUARTER STRENGTH) 473 ML: 0.12 SOLUTION at 04:36

## 2023-02-26 RX ADMIN — SIMVASTATIN 10 MG: 10 TABLET, FILM COATED ORAL at 04:33

## 2023-02-26 ASSESSMENT — ENCOUNTER SYMPTOMS
WEAKNESS: 0
SORE THROAT: 0
DIZZINESS: 0
HALLUCINATIONS: 0
ABDOMINAL PAIN: 0
NAUSEA: 0
BACK PAIN: 0
FEVER: 0
FOCAL WEAKNESS: 0
CHILLS: 0
VOMITING: 0
DIARRHEA: 0
PALPITATIONS: 0
DEPRESSION: 0
HEARTBURN: 0
SHORTNESS OF BREATH: 0
HEADACHES: 0
BLOOD IN STOOL: 0
MYALGIAS: 1
COUGH: 0

## 2023-02-26 ASSESSMENT — GAIT ASSESSMENTS
DISTANCE (FEET): 120
GAIT LEVEL OF ASSIST: SUPERVISED
ASSISTIVE DEVICE: FRONT WHEEL WALKER

## 2023-02-26 ASSESSMENT — PAIN DESCRIPTION - PAIN TYPE: TYPE: SURGICAL PAIN

## 2023-02-26 NOTE — PROGRESS NOTES
Hospital Medicine Daily Progress Note    Date of Service  2/26/2023    Chief Complaint  Raymundo Jackson is a 72 y.o. male admitted 2/22/2023 with left knee pain and fever    Hospital Course  History of recent left total knee arthroplasty who has been recovering well until 3 days prior to presentation when he noted profuse drainage from his surgical wound followed by sudden onset severe pain with weightbearing.  He presented to the ER and was found to have sepsis secondary to septic arthritis and his prosthetic joint.  Orthopedic surgery was consulted and they recommended emergent I&D    Interval Problem Update  2/23: Tolerating antibiotics, cultures growing strep, for possible discontinue vancomycin.  Lactate still high, 1 L fluid bolus repeated.  Blood pressure .  Blood Cx + for strep in 2 of 2 cx    2/24: ID rec continue ctx, Lactate improved to 2.1.  Pain improving.  Repeat blood cx drawn to eval if negative.  Planning repeat washout next week.    2/25: WBC improving change pain meds to tramadol.  Add gabapentin.  Discussed with ID, change antibiotics to Unasyn for less C. difficile risk.  Repeat blood cultures are negative so far.      2/26: Fluids discontinued.  Tolerating Unasyn.  Shooting pain in left lower extremity improving.  D/W wife at bedside    I have discussed this patient's plan of care and discharge plan at IDT rounds today with Case Management, Nursing, Nursing leadership, and other members of the IDT team.    Consultants/Specialty  orthopedics    Code Status  Full Code    Disposition  Patient is not medically cleared for discharge.   Anticipate discharge to to home with organized home healthcare and close outpatient follow-up.  I have placed the appropriate orders for post-discharge needs.    Review of Systems  Review of Systems   Constitutional:  Negative for chills, fever (Resolved) and malaise/fatigue.   HENT:  Negative for sore throat.    Respiratory:  Negative for cough and shortness  of breath.    Cardiovascular:  Positive for leg swelling. Negative for chest pain and palpitations.   Gastrointestinal:  Negative for abdominal pain, blood in stool, diarrhea, heartburn, nausea and vomiting.   Genitourinary:  Negative for dysuria and frequency.   Musculoskeletal:  Positive for joint pain and myalgias. Negative for back pain.   Skin:         Finger wound, wrapped   Neurological:  Negative for dizziness, focal weakness, weakness and headaches.   Psychiatric/Behavioral:  Negative for depression and hallucinations.    All other systems reviewed and are negative.     Physical Exam  Temp:  [36.5 °C (97.7 °F)-37.4 °C (99.4 °F)] 36.5 °C (97.7 °F)  Pulse:  [58-65] 60  Resp:  [18-20] 18  BP: (132-150)/(68-77) 145/77  SpO2:  [92 %-98 %] 97 %    Physical Exam  Constitutional:       Appearance: Normal appearance.   HENT:      Head: Normocephalic and atraumatic.      Nose: Nose normal.      Mouth/Throat:      Mouth: Mucous membranes are moist.   Eyes:      Extraocular Movements: Extraocular movements intact.      Pupils: Pupils are equal, round, and reactive to light.   Cardiovascular:      Rate and Rhythm: Normal rate and regular rhythm.      Heart sounds: No murmur heard.  Pulmonary:      Effort: Pulmonary effort is normal. No respiratory distress.      Breath sounds: Normal breath sounds. No stridor.   Abdominal:      General: Abdomen is flat. Bowel sounds are normal. There is no distension.      Palpations: Abdomen is soft.      Tenderness: There is no abdominal tenderness.   Musculoskeletal:         General: Swelling present. No tenderness or deformity.      Cervical back: Normal range of motion and neck supple.      Right lower leg: Edema present.      Left lower leg: Edema present.      Comments: Left lower leg has some erythema and swelling, wound VAC in place over left knee   Skin:     General: Skin is warm and dry.      Coloration: Skin is not pale.      Findings: Erythema present.      Comments: Finger  wound wrapped   Neurological:      General: No focal deficit present.      Mental Status: He is alert and oriented to person, place, and time. Mental status is at baseline.   Psychiatric:         Mood and Affect: Mood normal.         Behavior: Behavior normal.         Thought Content: Thought content normal.       Fluids    Intake/Output Summary (Last 24 hours) at 2/26/2023 1202  Last data filed at 2/26/2023 1035  Gross per 24 hour   Intake 1080 ml   Output 3680 ml   Net -2600 ml         Laboratory  Recent Labs     02/24/23  0106 02/25/23 0147 02/26/23  0106   WBC 14.3* 12.8* 11.0*   RBC 3.30* 3.27* 3.18*   HEMOGLOBIN 10.5* 10.4* 10.0*   HEMATOCRIT 31.4* 31.2* 29.9*   MCV 95.2 95.4 94.0   MCH 31.8 31.8 31.4   MCHC 33.4* 33.3* 33.4*   RDW 46.9 45.8 45.3   PLATELETCT 243 263 289   MPV 11.2 11.5 11.2       Recent Labs     02/25/23 0147 02/26/23 0106   SODIUM 137 137   POTASSIUM 3.6 3.6   CHLORIDE 106 106   CO2 22 22   GLUCOSE 146* 170*   BUN 17 15   CREATININE 0.85 0.75   CALCIUM 7.9* 7.5*                       Imaging  DX-CHEST-PORTABLE (1 VIEW)   Final Result      1.  No acute cardiopulmonary abnormality identified.      2.  Enlarged cardiac silhouette      DX-KNEE 2- LEFT   Final Result         1. Moderate joint effusion, new since prior.             Assessment/Plan  * Infection of prosthetic knee joint, initial encounter ([facility])- (present on admission)  Assessment & Plan  Presented acutely with sepsis secondary to left total knee infection w strep bacteramia  Surgical washout 2/22  Per Ortho Will need repeat washout, next week due to equipment availability (Tuesday)  Change to Unasyn (lower C Dif)  Discussed with ID  L knee and blood cx 2/22 + Group A Strep  Will need 6 weeks of IV antibiotics from date of negative blood culture and last surgery which ever is later  Wait for PICC until after next surgery to avoid seeding      Streptococcal bacteremia  Assessment & Plan  Blood cultures from 2/22 returned positive  in 2 of 2 cultures with strep continue IV ceftriaxone  Discussed with ID  Repeat blood cultures on 2/24 NGTD    Finger wound, simple, open- (present on admission)  Assessment & Plan  Does not currently look infected  Dressing changes    Septic arthritis (HCC)- (present on admission)  Assessment & Plan  Secondary to recent total knee which was done in early February by Dr. James  S/P washout 2/22 and repeat planned for next week  Planning for 6 weeks IV  Continue Unasyn  Discussed with ID  Added gabapentin and tramadol for pain    Sepsis (HCC)  Assessment & Plan  He had profound lactic acidosis due to septic left knee  Status post washout for source control on 2/22, repeat washout planned for next week  Continue empiric antibiotics  Blood and L knee cultures so far with group A strep    Dyslipidemia- (present on admission)  Assessment & Plan  Continue home ASA and Zocor    Prediabetes  Assessment & Plan  Prediabetic A1c 6.3  Metformin on hold         VTE prophylaxis: SCDs/TEDs and enoxaparin ppx    I have performed a physical exam and reviewed and updated ROS and Plan today (2/26/2023). In review of yesterday's note (2/25/2023), there are no changes except as documented above.    Total time:  52 minutes.  I spent greater than 50% of the time for patient care, counseling, and coordination on this date, including unit/floor time, and face-to-face time with the patient as per interval events and assessment and plan above

## 2023-02-26 NOTE — PROGRESS NOTES
Infectious Disease Progress Note    Author: Elgin Page M.D. Date & Time of service: 2023  11:27 AM    Chief Complaint:  Follow-up for bacteremia    Interval History:   Tmax 99.4, white count continues to trend down, 11,000 today, tolerated switch to Unasyn. Left leg and foot persistent swelling but with worse erythema and increased warmth, possible early blister formation    Labs Reviewed and Medications Reviewed.    Review of Systems:  Review of Systems   Constitutional:  Negative for chills and fever.   Gastrointestinal:  Negative for abdominal pain, diarrhea, nausea and vomiting.   Musculoskeletal:  Positive for joint pain.   Skin:  Negative for itching and rash.   All other systems reviewed and are negative.    Hemodynamics:  Temp (24hrs), Av °C (98.6 °F), Min:36.5 °C (97.7 °F), Max:37.4 °C (99.4 °F)  Temperature: 36.5 °C (97.7 °F)  Pulse  Av.6  Min: 58  Max: 119   Blood Pressure : (!) 145/77 (RN aware)       Physical Exam:  Physical Exam  Vitals and nursing note reviewed.   Constitutional:       General: He is not in acute distress.     Appearance: He is not ill-appearing.   HENT:      Mouth/Throat:      Pharynx: No oropharyngeal exudate.   Eyes:      General: No scleral icterus.        Right eye: No discharge.         Left eye: No discharge.      Conjunctiva/sclera: Conjunctivae normal.   Cardiovascular:      Rate and Rhythm: Normal rate.      Heart sounds: No murmur heard.  Pulmonary:      Effort: Pulmonary effort is normal. No respiratory distress.      Breath sounds: No stridor.   Abdominal:      General: Abdomen is flat. There is no distension.      Tenderness: There is no abdominal tenderness.   Musculoskeletal:         General: Swelling and tenderness present.      Comments: Left knee with surgical dressing in place.  Left leg with persistent edema and worsening erythema and increased warmth, possible early blister formation   Neurological:      General: No focal deficit present.       Mental Status: He is alert and oriented to person, place, and time.   Psychiatric:         Mood and Affect: Mood normal.         Behavior: Behavior normal.       Meds:    Current Facility-Administered Medications:     ampicillin-sulbactam (UNASYN) IV    gabapentin    traMADol    acetaminophen    dakins 0.125% (1/4 strength)    enoxaparin (LOVENOX) injection    aspirin    simvastatin    HYDROmorphone    LR    Labs:  Recent Labs     02/24/23 0106 02/25/23 0147 02/26/23  0106   WBC 14.3* 12.8* 11.0*   RBC 3.30* 3.27* 3.18*   HEMOGLOBIN 10.5* 10.4* 10.0*   HEMATOCRIT 31.4* 31.2* 29.9*   MCV 95.2 95.4 94.0   MCH 31.8 31.8 31.4   RDW 46.9 45.8 45.3   PLATELETCT 243 263 289   MPV 11.2 11.5 11.2   NEUTSPOLYS  --  76.60* 61.00   LYMPHOCYTES  --  9.50* 9.00*   MONOCYTES  --  9.50 13.00   EOSINOPHILS  --  1.30 5.00   BASOPHILS  --  0.50 0.00   RBCMORPHOLO  --   --  Present     Recent Labs     02/25/23 0147 02/26/23  0106   SODIUM 137 137   POTASSIUM 3.6 3.6   CHLORIDE 106 106   CO2 22 22   GLUCOSE 146* 170*   BUN 17 15     Recent Labs     02/25/23 0147 02/26/23  0106   ALBUMIN 2.2* 2.0*   CREATININE 0.85 0.75       Imaging:  DX-CHEST-PORTABLE (1 VIEW)    Result Date: 2/23/2023 2/23/2023 8:45 AM HISTORY/REASON FOR EXAM:  Fever. TECHNIQUE/EXAM DESCRIPTION AND NUMBER OF VIEWS: Single portable view of the chest. COMPARISON: None FINDINGS: LUNGS: The lungs are clear. HEART and MEDIASTINUM: enlarged. Pleura: There are no pleural effusion or pneumothoraces. 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     1.  No acute cardiopulmonary abnormality identified. 2.  Enlarged cardiac silhouette    DX-KNEE 2- LEFT    Result Date: 2/22/2023 2/22/2023 9:25 AM HISTORY/REASON FOR EXAM:  post op pain 3 weeks post op. Pt having left knee pain, swelling and seeping wound TECHNIQUE/EXAM DESCRIPTION AND NUMBER OF VIEWS:  2 views of the LEFT knee. COMPARISON: 2/1/2023 FINDINGS: Postsurgical changes from  "left knee arthoplasty. No periprosthetic fracture. No suspicious lucency. There are mild soft tissue swelling anteriorly. Moderate joint effusion     1. Moderate joint effusion, new since prior.    DX-KNEE 2- LEFT    Result Date: 2/1/2023 2/1/2023 8:57 AM HISTORY/REASON FOR EXAM:  Arthroplasty. TECHNIQUE/EXAM DESCRIPTION AND NUMBER OF VIEWS:  2 views of the LEFT knee. COMPARISON: None FINDINGS: Total knee arthroplasty with patellar resurfacing has been performed.  The components are cemented. No periprosthetic fracture is detected.     Total knee arthroplasty without periprosthetic fracture.       Micro:  Results       Procedure Component Value Units Date/Time    Blood Culture [[id number]]  (Abnormal) Collected: 02/22/23 0948    Order Status: Completed Specimen: Blood from Peripheral Updated: 02/25/23 1040     Significant Indicator POS     Source BLD     Site PERIPHERAL     Culture Result Growth detected by Bactec instrument. 02/22/2023  22:05      Beta Hemolytic Streptococcus group A  See previous culture for sensitivity report.      Narrative:      CALL  Otto  [facility] tel. [phone],  CALLED  [facility] tel. [phone] 02/24/2023, 08:29, RB PERF. RESULTS CALLED TO: RN  [id number]  2 of 2 blood culture x2  Sites order. Per Hospital Policy:  Only change Specimen Src: to \"Line\" if specified by physician  order.  Left AC    Blood Culture [[id number]]  (Abnormal)  (Susceptibility) Collected: 02/22/23 0925    Order Status: Completed Specimen: Blood from Peripheral Updated: 02/25/23 1039     Significant Indicator POS     Source BLD     Site PERIPHERAL     Culture Result Growth detected by Bactec instrument. 02/22/2023  22:03      Beta Hemolytic Streptococcus group A  This isolate does NOT demonstrate inducible Clindamycin  resistance in vitro.      Narrative:      CALL  Otto  [facility] tel. [phone],  CALLED  [facility] tel. [phone] 02/24/2023, 08:24, RB PERF. RESULTS CALLED TO: RN  [id number]  1 of 2 for Blood Culture x 2 sites order. Per " "Hospital  Policy: Only change Specimen Src: to \"Line\" if specified by  physician order.  Right AC    Susceptibility       Beta hemolytic streptococcus group a (1)       Antibiotic Interpretation Microscan   Method Status    Penicillin Sensitive 0.032 mcg/mL E-TEST Final    Cefotaxime Sensitive 0.032 mcg/mL E-TEST Final    Clindamycin Sensitive - mcg/mL E-TEST Final                       Anaerobic Culture [[id number]] Collected: 02/22/23 1428    Order Status: Completed Specimen: Tissue Updated: 02/25/23 0810     Significant Indicator NEG     Source TISS     Site Left Knee Deep     Culture Result Culture in progress.    Narrative:      CALL  Otto  [facility] tel. [phone],  CALLED  [facility] tel. [phone] 02/25/2023, 08:10, RB PERF. RESULTS CALLED TO: RN  [id number]  3 knee tissue  left knee deep  Surgery Specimen    CULTURE TISSUE W/ GRM STAIN [[id number]]  (Abnormal) Collected: 02/22/23 1428    Order Status: Completed Specimen: Tissue Updated: 02/25/23 0810     Significant Indicator POS     Source TISS     Site Left Knee Deep     Culture Result -     Gram Stain Result Rare WBCs.  No organisms seen.       Culture Result Streptococcus pyogenes (Group A)  Rare growth      Narrative:      CALL  Otto  [facility] tel. [phone],  CALLED  [facility] tel. [phone] 02/25/2023, 08:10, RB PERF. RESULTS CALLED TO: RN  [id number]  3 knee tissue  left knee deep  Surgery Specimen    Anaerobic Culture [[id number]] Collected: 02/22/23 1426    Order Status: Completed Specimen: Body Fluid Updated: 02/25/23 0809     Significant Indicator NEG     Source BF     Site left knee deep     Culture Result Culture in progress.    Narrative:      CALL  Otto  [facility] tel. [phone],  CALLED  [facility] tel. [phone] 02/24/2023, 07:01, RB PERF. RESULTS CALLED TO: RN  [phone]  2 knee body fluid  left knee deep  Surgery - swabs received    CULTURE WOUND W/ GRAM STAIN [[id number]]  (Abnormal) Collected: 02/22/23 1426    Order Status: Completed Specimen: Body Fluid Updated: 02/25/23 0809     " Significant Indicator POS     Source BF     Site left knee deep     Culture Result -     Gram Stain Result Few WBCs.  No organisms seen.       Culture Result Beta Hemolytic Streptococcus group A  Light growth      Narrative:      CALL  Otto  [facility] tel. [phone],  CALLED  [facility] tel. [phone] 02/24/2023, 07:01, RB PERF. RESULTS CALLED TO: RN  [id number]  2 knee body fluid  left knee deep  Surgery - swabs received    Anaerobic Culture [[id number]] Collected: 02/22/23 1417    Order Status: Completed Specimen: Body Fluid Updated: 02/25/23 0808     Significant Indicator NEG     Source BF     Site left knee deep     Culture Result Culture in progress.    Narrative:      CALL  Otto  [facility] tel. [phone],  CALLED  [facility] tel. [phone] 02/24/2023, 07:00, RB PERF. RESULTS CALLED TO: RN  [id number]  1 knee body fluid  left knee deep  Surgery - swabs received    CULTURE WOUND W/ GRAM STAIN [[id number]]  (Abnormal) Collected: 02/22/23 1417    Order Status: Completed Specimen: Body Fluid Updated: 02/25/23 0808     Significant Indicator POS     Source BF     Site left knee deep     Culture Result -     Gram Stain Result No organisms seen.     Culture Result Beta Hemolytic Streptococcus group A  Light growth      Narrative:      CALL  Otto  [facility] tel. [phone],  CALLED  [facility] tel. [phone] 02/24/2023, 07:00, RB PERF. RESULTS CALLED TO: RN  [phone]  1 knee body fluid  left knee deep  Surgery - swabs received    Culture Wound With Gram Stain [[id number]]  (Abnormal) Collected: 02/22/23 0930    Order Status: Completed Specimen: Wound from Left Leg Updated: 02/24/23 1046     Significant Indicator POS     Source WND     Site LEFT LEG     Culture Result -     Gram Stain Result Rare Gram positive cocci.  Rare WBCs.       Culture Result Beta Hemolytic Streptococcus group A  Heavy growth      Narrative:      CALL  Otto  [facility] tel. [phone],  CALLED  [facility] tel. [phone] 02/23/2023, 06:40, RB PERF. RESULTS CALLED TO: RN  [phone]    BLOOD CULTURE [[id number]]  Collected: 02/24/23 0400    Order Status: Canceled Specimen: Other from Peripheral     BLOOD CULTURE [[id number]] Collected: 02/24/23 0106    Order Status: Canceled Specimen: Other from Peripheral     GRAM STAIN [[id number]] Collected: 02/22/23 1417    Order Status: Completed Specimen: Body Fluid Updated: 02/23/23 1640     Significant Indicator .     Source BF     Site left knee deep     Gram Stain Result No organisms seen.    Narrative:      1 knee body fluid  left knee deep  Surgery - swabs received    GRAM STAIN [[id number]] Collected: 02/22/23 1426    Order Status: Completed Specimen: Body Fluid Updated: 02/23/23 1640     Significant Indicator .     Source BF     Site left knee deep     Gram Stain Result Few WBCs.  No organisms seen.      Narrative:      2 knee body fluid  left knee deep  Surgery - swabs received    GRAM STAIN [[id number]] Collected: 02/22/23 1428    Order Status: Completed Specimen: Tissue Updated: 02/23/23 1629     Significant Indicator .     Source TISS     Site Left Knee Deep     Gram Stain Result Rare WBCs.  No organisms seen.      Narrative:      3 knee tissue  left knee deep  Surgery Specimen    Blood Culture [[id number]] Collected: 02/22/23 2015    Order Status: Completed Specimen: Blood from Peripheral Updated: 02/23/23 0644     Significant Indicator NEG     Source BLD     Site PERIPHERAL     Culture Result No Growth  Note: Blood cultures are incubated for 5 days and  are monitored continuously.Positive blood cultures  are called to the RN and reported as soon as  they are identified.  Blood culture testing and Gram stain, if indicated, are  performed at [address].  Positive blood cultures are  sent to [facility], [address] [place] [address], for organism identification and  susceptibility testing.      Narrative:      From different peripheral sites, if not done within the last  24 hours (Per Hospital  "Policy: Only change specimen source to  \"Line\" if specified by physician order)  Right Hand    Blood Culture [[id number]] Collected: 02/22/23 2335    Order Status: Completed Specimen: Blood from Peripheral Updated: 02/23/23 0644     Significant Indicator NEG     Source BLD     Site PERIPHERAL     Culture Result No Growth  Note: Blood cultures are incubated for 5 days and  are monitored continuously.Positive blood cultures  are called to the RN and reported as soon as  they are identified.  Blood culture testing and Gram stain, if indicated, are  performed at [facility], [address] [place] [address].  Positive blood cultures are  sent to [facility], [address] [place] [address], for organism identification and  susceptibility testing.      Narrative:      From different peripheral sites, if not done within the last  24 hours (Per Hospital Policy: Only change specimen source to  \"Line\" if specified by physician order)  Left Hand    GRAM STAIN [[id number]] Collected: 02/22/23 0930    Order Status: Completed Specimen: Wound Updated: 02/23/23 0152     Significant Indicator .     Source WND     Site LEFT LEG     Gram Stain Result Rare Gram positive cocci.  Rare WBCs.              Assessment:  Raymundo Jackson is a 72 y.o. male patient admitted with group A strep bacteremia from infected left prosthetic knee joint.  Possible source is right middle finger wound     Pertinent Diagnoses:  Sepsis, resolved  Group A Strep bacteremia  Left knee prosthetic joint infection  Left leg and foot cellulitis    Plan:  -Continue IV Unasyn for now  -Repeat blood cultures x2 from 2/22 no growth till date  -Plan is for repeat I&D with poly exchange on Tuesday.  We will follow along  -Worsening exam of left leg and foot despite antibiotics is concerning.  Recommend CT with contrast to look for underlying abscess. Will add IV clindamycin 900 mg every 8 hours    Plan was discussed with " the primary team, Dr. Noel     Disposition: Patient prefers eventual discharge to home with home IV antibiotics.  Paper orders for IV ceftriaxone 2 g every 24 hours through 4/10/2023 faxed to CM on 2/26  At least weekly CBC with differential, CMP while on IV antibiotics  Following above IV antibiotic course, anticipate oral antibiotics for approximately 3-6 months or longer  Need for PICC line: Yes but recommend waiting after Tuesday's surgery to avoid seeding     ID will follow.     ID clinic follow-up in 2 to 3 weeks.  Okay to schedule with NP Shell

## 2023-02-26 NOTE — CARE PLAN
The patient is Stable - Low risk of patient condition declining or worsening    Shift Goals  Clinical Goals: pain control, out of bed  Patient Goals: pain control, rest    Progress made toward(s) clinical / shift goals:  Patient educated on the importance of getting out of bed. States he will attempted later on in the afternoon.       Problem: Knowledge Deficit - Standard  Goal: Patient and family/care givers will demonstrate understanding of plan of care, disease process/condition, diagnostic tests and medications  Outcome: Progressing     Problem: Hemodynamics  Goal: Patient's hemodynamics, fluid balance and neurologic status will be stable or improve  Outcome: Progressing     Problem: Fluid Volume  Goal: Fluid volume balance will be maintained  Outcome: Progressing     Problem: Urinary - Renal Perfusion  Goal: Ability to achieve and maintain adequate renal perfusion and functioning will improve  Outcome: Progressing     Problem: Respiratory  Goal: Patient will achieve/maintain optimum respiratory ventilation and gas exchange  Outcome: Progressing     Problem: Mechanical Ventilation  Goal: Safe management of artificial airway and ventilation  Outcome: Progressing  Goal: Successful weaning off mechanical ventilator, spontaneously maintains adequate gas exchange  Outcome: Progressing  Goal: Patient will be able to express needs and understand communication  Outcome: Progressing     Problem: Physical Regulation  Goal: Diagnostic test results will improve  Outcome: Progressing  Goal: Signs and symptoms of infection will decrease  Outcome: Progressing     Problem: Fall Risk  Goal: Patient will remain free from falls  Outcome: Progressing     Problem: Pain - Standard  Goal: Alleviation of pain or a reduction in pain to the patient’s comfort goal  Outcome: Progressing

## 2023-02-26 NOTE — CARE PLAN
The patient is Stable - Low risk of patient condition declining or worsening    Shift Goals  Clinical Goals: Patient will report pain improvement after interventions this shift  Patient Goals: Rest comfortably    Progress made toward(s) clinical / shift goals:  Patient reports pain improvement after interventions, see MAR.     Patient is not progressing towards the following goals: N/A

## 2023-02-27 ENCOUNTER — ANESTHESIA EVENT (OUTPATIENT)
Dept: SURGERY | Facility: MEDICAL CENTER | Age: 73
DRG: 485 | End: 2023-02-27
Payer: MEDICARE

## 2023-02-27 ENCOUNTER — APPOINTMENT (OUTPATIENT)
Dept: RADIOLOGY | Facility: MEDICAL CENTER | Age: 73
DRG: 485 | End: 2023-02-27
Attending: INTERNAL MEDICINE
Payer: MEDICARE

## 2023-02-27 PROBLEM — R60.1 ANASARCA: Status: ACTIVE | Noted: 2023-02-27

## 2023-02-27 LAB
ALBUMIN SERPL BCP-MCNC: 2.1 G/DL (ref 3.2–4.9)
BACTERIA BLD CULT: NORMAL
BASOPHILS # BLD AUTO: 0.9 % (ref 0–1.8)
BASOPHILS # BLD: 0.1 K/UL (ref 0–0.12)
BUN SERPL-MCNC: 13 MG/DL (ref 8–22)
CALCIUM ALBUM COR SERPL-MCNC: 9.2 MG/DL (ref 8.5–10.5)
CALCIUM SERPL-MCNC: 7.7 MG/DL (ref 8.4–10.2)
CHLORIDE SERPL-SCNC: 104 MMOL/L (ref 96–112)
CO2 SERPL-SCNC: 21 MMOL/L (ref 20–33)
CREAT SERPL-MCNC: 0.82 MG/DL (ref 0.5–1.4)
EOSINOPHIL # BLD AUTO: 0.36 K/UL (ref 0–0.51)
EOSINOPHIL NFR BLD: 3.1 % (ref 0–6.9)
ERYTHROCYTE [DISTWIDTH] IN BLOOD BY AUTOMATED COUNT: 46.8 FL (ref 35.9–50)
GFR SERPLBLD CREATININE-BSD FMLA CKD-EPI: 92 ML/MIN/1.73 M 2
GLUCOSE SERPL-MCNC: 138 MG/DL (ref 65–99)
HCT VFR BLD AUTO: 32.5 % (ref 42–52)
HGB BLD-MCNC: 10.9 G/DL (ref 14–18)
IMM GRANULOCYTES # BLD AUTO: 0.58 K/UL (ref 0–0.11)
IMM GRANULOCYTES NFR BLD AUTO: 5.1 % (ref 0–0.9)
LYMPHOCYTES # BLD AUTO: 1.81 K/UL (ref 1–4.8)
LYMPHOCYTES NFR BLD: 15.8 % (ref 22–41)
MAGNESIUM SERPL-MCNC: 2.1 MG/DL (ref 1.5–2.5)
MCH RBC QN AUTO: 31.9 PG (ref 27–33)
MCHC RBC AUTO-ENTMCNC: 33.5 G/DL (ref 33.7–35.3)
MCV RBC AUTO: 95 FL (ref 81.4–97.8)
MONOCYTES # BLD AUTO: 1.28 K/UL (ref 0–0.85)
MONOCYTES NFR BLD AUTO: 11.2 % (ref 0–13.4)
NEUTROPHILS # BLD AUTO: 7.34 K/UL (ref 1.82–7.42)
NEUTROPHILS NFR BLD: 63.9 % (ref 44–72)
NRBC # BLD AUTO: 0 K/UL
NRBC BLD-RTO: 0 /100 WBC
PHOSPHATE SERPL-MCNC: 3.5 MG/DL (ref 2.5–4.5)
PLATELET # BLD AUTO: 367 K/UL (ref 164–446)
PMV BLD AUTO: 11 FL (ref 9–12.9)
POTASSIUM SERPL-SCNC: 4 MMOL/L (ref 3.6–5.5)
RBC # BLD AUTO: 3.42 M/UL (ref 4.7–6.1)
SIGNIFICANT IND 70042: NORMAL
SITE SITE: NORMAL
SODIUM SERPL-SCNC: 135 MMOL/L (ref 135–145)
SOURCE SOURCE: NORMAL
WBC # BLD AUTO: 11.5 K/UL (ref 4.8–10.8)

## 2023-02-27 PROCEDURE — 700117 HCHG RX CONTRAST REV CODE 255: Performed by: INTERNAL MEDICINE

## 2023-02-27 PROCEDURE — 700111 HCHG RX REV CODE 636 W/ 250 OVERRIDE (IP): Performed by: INTERNAL MEDICINE

## 2023-02-27 PROCEDURE — 73701 CT LOWER EXTREMITY W/DYE: CPT | Mod: LT

## 2023-02-27 PROCEDURE — 770001 HCHG ROOM/CARE - MED/SURG/GYN PRIV*

## 2023-02-27 PROCEDURE — 97530 THERAPEUTIC ACTIVITIES: CPT

## 2023-02-27 PROCEDURE — 99232 SBSQ HOSP IP/OBS MODERATE 35: CPT | Performed by: INTERNAL MEDICINE

## 2023-02-27 PROCEDURE — 97116 GAIT TRAINING THERAPY: CPT

## 2023-02-27 PROCEDURE — 700101 HCHG RX REV CODE 250: Performed by: INTERNAL MEDICINE

## 2023-02-27 PROCEDURE — 85025 COMPLETE CBC W/AUTO DIFF WBC: CPT

## 2023-02-27 PROCEDURE — A9270 NON-COVERED ITEM OR SERVICE: HCPCS | Performed by: INTERNAL MEDICINE

## 2023-02-27 PROCEDURE — 700105 HCHG RX REV CODE 258: Performed by: INTERNAL MEDICINE

## 2023-02-27 PROCEDURE — 700111 HCHG RX REV CODE 636 W/ 250 OVERRIDE (IP): Performed by: NURSE PRACTITIONER

## 2023-02-27 PROCEDURE — A9270 NON-COVERED ITEM OR SERVICE: HCPCS | Performed by: NURSE PRACTITIONER

## 2023-02-27 PROCEDURE — 83735 ASSAY OF MAGNESIUM: CPT

## 2023-02-27 PROCEDURE — 80069 RENAL FUNCTION PANEL: CPT

## 2023-02-27 PROCEDURE — 94760 N-INVAS EAR/PLS OXIMETRY 1: CPT

## 2023-02-27 PROCEDURE — 700102 HCHG RX REV CODE 250 W/ 637 OVERRIDE(OP): Performed by: NURSE PRACTITIONER

## 2023-02-27 PROCEDURE — 36415 COLL VENOUS BLD VENIPUNCTURE: CPT

## 2023-02-27 PROCEDURE — 700102 HCHG RX REV CODE 250 W/ 637 OVERRIDE(OP): Performed by: INTERNAL MEDICINE

## 2023-02-27 RX ORDER — CEFAZOLIN SODIUM 1 G/3ML
2 INJECTION, POWDER, FOR SOLUTION INTRAMUSCULAR; INTRAVENOUS ONCE
Status: CANCELLED | OUTPATIENT
Start: 2023-02-28 | End: 2023-02-28

## 2023-02-27 RX ADMIN — IOHEXOL 100 ML: 350 INJECTION, SOLUTION INTRAVENOUS at 13:23

## 2023-02-27 RX ADMIN — AMPICILLIN AND SULBACTAM 3 G: 1; 2 INJECTION, POWDER, FOR SOLUTION INTRAMUSCULAR; INTRAVENOUS at 05:36

## 2023-02-27 RX ADMIN — DAKIN'S SOLUTION 0.125% (QUARTER STRENGTH) 473 ML: 0.12 SOLUTION at 05:37

## 2023-02-27 RX ADMIN — DAKIN'S SOLUTION 0.125% (QUARTER STRENGTH) 473 ML: 0.12 SOLUTION at 17:28

## 2023-02-27 RX ADMIN — GABAPENTIN 100 MG: 100 CAPSULE ORAL at 11:15

## 2023-02-27 RX ADMIN — GABAPENTIN 100 MG: 100 CAPSULE ORAL at 17:28

## 2023-02-27 RX ADMIN — AMPICILLIN AND SULBACTAM 3 G: 1; 2 INJECTION, POWDER, FOR SOLUTION INTRAMUSCULAR; INTRAVENOUS at 11:15

## 2023-02-27 RX ADMIN — CLINDAMYCIN PHOSPHATE 900 MG: 900 INJECTION, SOLUTION INTRAVENOUS at 06:17

## 2023-02-27 RX ADMIN — CLINDAMYCIN PHOSPHATE 900 MG: 900 INJECTION, SOLUTION INTRAVENOUS at 15:03

## 2023-02-27 RX ADMIN — SIMVASTATIN 10 MG: 10 TABLET, FILM COATED ORAL at 05:37

## 2023-02-27 RX ADMIN — CLINDAMYCIN PHOSPHATE 900 MG: 900 INJECTION, SOLUTION INTRAVENOUS at 22:03

## 2023-02-27 RX ADMIN — AMPICILLIN AND SULBACTAM 3 G: 1; 2 INJECTION, POWDER, FOR SOLUTION INTRAMUSCULAR; INTRAVENOUS at 17:27

## 2023-02-27 RX ADMIN — AMPICILLIN AND SULBACTAM 3 G: 1; 2 INJECTION, POWDER, FOR SOLUTION INTRAMUSCULAR; INTRAVENOUS at 23:16

## 2023-02-27 RX ADMIN — HYDROMORPHONE HYDROCHLORIDE 0.5 MG: 1 INJECTION, SOLUTION INTRAMUSCULAR; INTRAVENOUS; SUBCUTANEOUS at 03:14

## 2023-02-27 RX ADMIN — GABAPENTIN 100 MG: 100 CAPSULE ORAL at 05:37

## 2023-02-27 RX ADMIN — TRAMADOL HYDROCHLORIDE 50 MG: 50 TABLET, COATED ORAL at 02:08

## 2023-02-27 ASSESSMENT — GAIT ASSESSMENTS
GAIT LEVEL OF ASSIST: STANDBY ASSIST
DEVIATION: DECREASED TOE OFF;DECREASED HEEL STRIKE;ANTALGIC
DISTANCE (FEET): 100
ASSISTIVE DEVICE: FRONT WHEEL WALKER

## 2023-02-27 ASSESSMENT — ENCOUNTER SYMPTOMS
COUGH: 0
WEAKNESS: 0
SHORTNESS OF BREATH: 0
HEARTBURN: 0
DIZZINESS: 0
MYALGIAS: 1
DIARRHEA: 0
ABDOMINAL PAIN: 0
DEPRESSION: 0
VOMITING: 0
HALLUCINATIONS: 0
BLOOD IN STOOL: 0
NAUSEA: 0
FEVER: 0
BACK PAIN: 0
CHILLS: 0
HEADACHES: 0
PALPITATIONS: 0
FOCAL WEAKNESS: 0
SORE THROAT: 0

## 2023-02-27 ASSESSMENT — PATIENT HEALTH QUESTIONNAIRE - PHQ9
SUM OF ALL RESPONSES TO PHQ9 QUESTIONS 1 AND 2: 0
1. LITTLE INTEREST OR PLEASURE IN DOING THINGS: NOT AT ALL
2. FEELING DOWN, DEPRESSED, IRRITABLE, OR HOPELESS: NOT AT ALL
1. LITTLE INTEREST OR PLEASURE IN DOING THINGS: NOT AT ALL
SUM OF ALL RESPONSES TO PHQ9 QUESTIONS 1 AND 2: 0
2. FEELING DOWN, DEPRESSED, IRRITABLE, OR HOPELESS: NOT AT ALL

## 2023-02-27 ASSESSMENT — COGNITIVE AND FUNCTIONAL STATUS - GENERAL
MOVING TO AND FROM BED TO CHAIR: A LOT
WALKING IN HOSPITAL ROOM: A LITTLE
TURNING FROM BACK TO SIDE WHILE IN FLAT BAD: A LITTLE
MOVING FROM LYING ON BACK TO SITTING ON SIDE OF FLAT BED: A LITTLE
CLIMB 3 TO 5 STEPS WITH RAILING: TOTAL
SUGGESTED CMS G CODE MODIFIER MOBILITY: CK
STANDING UP FROM CHAIR USING ARMS: A LITTLE
MOBILITY SCORE: 15

## 2023-02-27 ASSESSMENT — PAIN DESCRIPTION - PAIN TYPE
TYPE: SURGICAL PAIN

## 2023-02-27 NOTE — PROGRESS NOTES
Received report from night shift RN, assumed care of pt. Sandra VSS. Pt states pain 0/10.LLE moderate swelling, on prevena.    Pt updated on plan of care for the day. All questions answered. No other needs at this time. Call light and belongings within reach, bed locked and in lowest position. Pt educated to call for assistance.Hourly rounding in place.

## 2023-02-27 NOTE — DISCHARGE PLANNING
Received Choice form at 1355  Agency/Facility Name: [facility]  Referral sent per Choice form @ 1501     Agency/Facility Name: [facility]  Spoke To: Jordan  Outcome: Per Jordan, he contacted pt and was told about the pt's [employer] insurance. Per Jordan, the pt had concerns regarding costs of infusion. Per Jordan, he had a question regarding dc date.   RN DALTON notified

## 2023-02-27 NOTE — THERAPY
Physical Therapy   Daily Treatment     Patient Name: Raymundo Jackson  Age:  72 y.o., Sex:  male  Medical Record #: [id number]  Today's Date: 2/27/2023     Precautions  Precautions: Weight Bearing As Tolerated Left Lower Extremity    Assessment    Pt agreeable for PT, antalgic gait pattern when ambulating but steady on feet. Pt with increased muscle guarding with attempts to perform L knee flexion, ROM limited due to pain. Pt states would like to continue with outpt PT upon DC, does not want HHPT.     Plan    Physical Therapy Treatment Plan  Physical Therapy Treatment Plan: Continue Current Treatment Plan    DC Equipment Recommendations: None, Raised Toilet Seat with Arms  Discharge Recommendations: Recommend outpatient physical therapy services to address higher level deficits       02/27/23 1449   Cognition    Comments Pt agreeable for PT, states does not like bending his knee   Passive ROM Lower Body   Lt Knee Flexion Degrees 50   Comments muscle guarding limiting ROM   Supine Lower Body Exercise   Comments QSx10, AP x10   Sitting Lower Body Exercises   Comments seated knee flex- contract/ relax hams x10 reps   Neuro-Muscular Treatments   Neuro-Muscular Treatments Reciprocal Inhibition;Weight Shift Right;Sequencing   Balance   Sitting Balance (Static) Fair +   Sitting Balance (Dynamic) Fair   Standing Balance (Static) Fair   Standing Balance (Dynamic) Fair   Weight Shift Sitting Fair   Weight Shift Standing Fair   Skilled Intervention Postural Facilitation;Sequencing;Tactile Cuing;Verbal Cuing   Comments stdg with FWW   Bed Mobility    Supine to Sit Moderate Assist  (with L LE)   Gait Analysis   Gait Level Of Assist Standby Assist   Assistive Device Front Wheel Walker   Distance (Feet) 100   # of Times Distance was Traveled 1   Deviation Decreased Toe Off;Decreased Heel Strike;Antalgic   Functional Mobility   Sit to Stand Standby Assist   Bed, Chair, Wheelchair Transfer Standby Assist   Transfer Method Stand Step    Activity Tolerance   Standing 10 min   Short Term Goals    Short Term Goal # 1 pt will go supine<>sit w/hob flat and rails down w/spv in 6tx   Goal Outcome # 1 Progressing slower than expected   Short Term Goal # 2 pt will go sit<>stand w/fww w/spv in 6tx   Goal Outcome # 2 Progressing slower than expected   Short Term Goal # 3 pt will transfer bed<>chair w/fww w/spv in 6tx   Goal Outcome # 3 Progressing as expected   Short Term Goal # 4 pt will ambulate for 150ft w/fww w/spv in 6tx   Goal Outcome # 4 Progressing slower than expected   Short Term Goal # 5 pt will go up/down 2 steps w/spv in 6tx   Goal Outcome # 5 Goal not met

## 2023-02-27 NOTE — PROGRESS NOTES
Infectious Disease Progress Note    Author: Elgin Page M.D. Date & Time of service: 2023  7:24 AM    Chief Complaint:  Follow-up for bacteremia    Interval History:   Tmax 99.4, white count continues to trend down, 11,000 today, tolerated switch to Unasyn. Left leg and foot persistent swelling but with worse erythema and increased warmth, possible early blister formation   patient remains afebrile, white count is 11.5 today, with findings evolved as below    Labs Reviewed and Medications Reviewed.    Review of Systems:  Review of Systems   Constitutional:  Negative for chills and fever.   Gastrointestinal:  Negative for abdominal pain, diarrhea, nausea and vomiting.   Musculoskeletal:  Positive for joint pain.   Skin:  Negative for itching and rash.   All other systems reviewed and are negative.    Hemodynamics:  Temp (24hrs), Av.6 °C (97.9 °F), Min:36.4 °C (97.5 °F), Max:36.9 °C (98.5 °F)  Temperature: 36.4 °C (97.5 °F)  Pulse  Av.9  Min: 57  Max: 119   Blood Pressure : 133/74       Physical Exam:  Physical Exam  Vitals and nursing note reviewed.   Constitutional:       General: He is not in acute distress.     Appearance: He is not ill-appearing.   HENT:      Mouth/Throat:      Pharynx: No oropharyngeal exudate.   Eyes:      General: No scleral icterus.        Right eye: No discharge.         Left eye: No discharge.      Conjunctiva/sclera: Conjunctivae normal.   Cardiovascular:      Rate and Rhythm: Normal rate.      Heart sounds: No murmur heard.  Pulmonary:      Effort: Pulmonary effort is normal. No respiratory distress.      Breath sounds: No stridor.   Abdominal:      General: Abdomen is flat. There is no distension.      Tenderness: There is no abdominal tenderness.   Musculoskeletal:         General: Swelling and tenderness present.      Comments: Left knee with surgical dressing in place.  Left leg with persistent edema -the erythema has receded and is more dusky and confluent  today compared to yesterday.  Single blister, the other possible early blister formation sites have improved   Neurological:      General: No focal deficit present.      Mental Status: He is alert and oriented to person, place, and time.   Psychiatric:         Mood and Affect: Mood normal.         Behavior: Behavior normal.       Meds:    Current Facility-Administered Medications:     clindamycin (CLEOCIN) IV    ampicillin-sulbactam (UNASYN) IV    gabapentin    traMADol    acetaminophen    dakins 0.125% (1/4 strength)    enoxaparin (LOVENOX) injection    aspirin    simvastatin    HYDROmorphone    LR    Labs:  Recent Labs     02/25/23 0147 02/26/23 0106 02/27/23  0121   WBC 12.8* 11.0* 11.5*   RBC 3.27* 3.18* 3.42*   HEMOGLOBIN 10.4* 10.0* 10.9*   HEMATOCRIT 31.2* 29.9* 32.5*   MCV 95.4 94.0 95.0   MCH 31.8 31.4 31.9   RDW 45.8 45.3 46.8   PLATELETCT 263 289 367   MPV 11.5 11.2 11.0   NEUTSPOLYS 76.60* 61.00 63.90   LYMPHOCYTES 9.50* 9.00* 15.80*   MONOCYTES 9.50 13.00 11.20   EOSINOPHILS 1.30 5.00 3.10   BASOPHILS 0.50 0.00 0.90   RBCMORPHOLO  --  Present  --        Recent Labs     02/25/23 0147 02/26/23 0106 02/27/23  0121   SODIUM 137 137 135   POTASSIUM 3.6 3.6 4.0   CHLORIDE 106 106 104   CO2 22 22 21   GLUCOSE 146* 170* 138*   BUN 17 15 13       Recent Labs     02/25/23 0147 02/26/23 0106 02/27/23  0121   ALBUMIN 2.2* 2.0* 2.1*   CREATININE 0.85 0.75 0.82         Imaging:  DX-CHEST-PORTABLE (1 VIEW)    Result Date: 2/23/2023 2/23/2023 8:45 AM HISTORY/REASON FOR EXAM:  Fever. TECHNIQUE/EXAM DESCRIPTION AND NUMBER OF VIEWS: Single portable view of the chest. COMPARISON: None FINDINGS: LUNGS: The lungs are clear. HEART and MEDIASTINUM: enlarged. Pleura: There are no pleural effusion or pneumothoraces. 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     1.  No acute cardiopulmonary abnormality identified. 2.  Enlarged cardiac silhouette    DX-KNEE 2- LEFT    Result Date:  2/22/2023 2/22/2023 9:25 AM HISTORY/REASON FOR EXAM:  post op pain 3 weeks post op. Pt having left knee pain, swelling and seeping wound TECHNIQUE/EXAM DESCRIPTION AND NUMBER OF VIEWS:  2 views of the LEFT knee. COMPARISON: 2/1/2023 FINDINGS: Postsurgical changes from left knee arthoplasty. No periprosthetic fracture. No suspicious lucency. There are mild soft tissue swelling anteriorly. Moderate joint effusion     1. Moderate joint effusion, new since prior.    DX-KNEE 2- LEFT    Result Date: 2/1/2023 2/1/2023 8:57 AM HISTORY/REASON FOR EXAM:  Arthroplasty. TECHNIQUE/EXAM DESCRIPTION AND NUMBER OF VIEWS:  2 views of the LEFT knee. COMPARISON: None FINDINGS: Total knee arthroplasty with patellar resurfacing has been performed.  The components are cemented. No periprosthetic fracture is detected.     Total knee arthroplasty without periprosthetic fracture.       Micro:  Results       Procedure Component Value Units Date/Time    Anaerobic Culture [[id number]] Collected: 02/22/23 1428    Order Status: Completed Specimen: Tissue Updated: 02/26/23 1335     Significant Indicator NEG     Source TISS     Site Left Knee Deep     Culture Result No Anaerobes isolated.    Narrative:      CALL  Otto  [facility] tel. [phone],  CALLED  [facility] tel. [phone] 02/25/2023, 08:10, RB PERF. RESULTS CALLED TO: RN  [id number]  3 knee tissue  left knee deep  Surgery Specimen    CULTURE TISSUE W/ GRM STAIN [[id number]]  (Abnormal) Collected: 02/22/23 1428    Order Status: Completed Specimen: Tissue Updated: 02/26/23 1335     Significant Indicator POS     Source TISS     Site Left Knee Deep     Culture Result -     Gram Stain Result Rare WBCs.  No organisms seen.       Culture Result Streptococcus pyogenes (Group A)  Rare growth      Narrative:      CALL  Otto  [facility] tel. [phone],  CALLED  [facility] tel. [phone] 02/25/2023, 08:10, RB PERF. RESULTS CALLED TO: RN  [id number]  3 knee tissue  left knee deep  Surgery Specimen    CULTURE WOUND W/ GRAM STAIN  [[id number]]  (Abnormal) Collected: 02/22/23 1426    Order Status: Completed Specimen: Body Fluid Updated: 02/26/23 1334     Significant Indicator POS     Source BF     Site left knee deep     Culture Result -     Gram Stain Result Few WBCs.  No organisms seen.       Culture Result Beta Hemolytic Streptococcus group A  Light growth      Narrative:      CALL  Otto  [facility] tel. [phone],  CALLED  [facility] tel. [phone] 02/24/2023, 07:01, RB PERF. RESULTS CALLED TO: RN  [id number]  2 knee body fluid  left knee deep  Surgery - swabs received    Anaerobic Culture [[id number]] Collected: 02/22/23 1426    Order Status: Completed Specimen: Body Fluid Updated: 02/26/23 1334     Significant Indicator NEG     Source BF     Site left knee deep     Culture Result No Anaerobes isolated.    Narrative:      CALL  Otto  [facility] tel. [phone],  CALLED  [facility] tel. [phone] 02/24/2023, 07:01, RB PERF. RESULTS CALLED TO: RN  [id number]  2 knee body fluid  left knee deep  Surgery - swabs received    Anaerobic Culture [[id number]] Collected: 02/22/23 1417    Order Status: Completed Specimen: Body Fluid Updated: 02/26/23 1334     Significant Indicator NEG     Source BF     Site left knee deep     Culture Result No Anaerobes isolated.    Narrative:      CALL  Otto  [facility] tel. [phone],  CALLED  [facility] tel. [phone] 02/24/2023, 07:00, RB PERF. RESULTS CALLED TO: RN  [id number]  1 knee body fluid  left knee deep  Surgery - swabs received    CULTURE WOUND W/ GRAM STAIN [[id number]]  (Abnormal) Collected: 02/22/23 1417    Order Status: Completed Specimen: Body Fluid Updated: 02/26/23 1334     Significant Indicator POS     Source BF     Site left knee deep     Culture Result -     Gram Stain Result No organisms seen.     Culture Result Beta Hemolytic Streptococcus group A  Light growth      Narrative:      CALL  Otto  U tel. [phone],  CALLED  U tel. [phone] 02/24/2023, 07:00, RB PERF. RESULTS CALLED TO: RN  [id number]  1 knee body fluid  left knee deep  Surgery -  "swabs received    Blood Culture [[id number]]  (Abnormal) Collected: 02/22/23 0948    Order Status: Completed Specimen: Blood from Peripheral Updated: 02/25/23 1040     Significant Indicator POS     Source BLD     Site PERIPHERAL     Culture Result Growth detected by Bactec instrument. 02/22/2023  22:05      Beta Hemolytic Streptococcus group A  See previous culture for sensitivity report.      Narrative:      CALL  Otto  [facility] tel. [phone],  CALLED  [facility] tel. [phone] 02/24/2023, 08:29, RB PERF. RESULTS CALLED TO: RN  [id number]  2 of 2 blood culture x2  Sites order. Per Hospital Policy:  Only change Specimen Src: to \"Line\" if specified by physician  order.  Left AC    Blood Culture [[id number]]  (Abnormal)  (Susceptibility) Collected: 02/22/23 0925    Order Status: Completed Specimen: Blood from Peripheral Updated: 02/25/23 1039     Significant Indicator POS     Source BLD     Site PERIPHERAL     Culture Result Growth detected by Bactec instrument. 02/22/2023  22:03      Beta Hemolytic Streptococcus group A  This isolate does NOT demonstrate inducible Clindamycin  resistance in vitro.      Narrative:      CALL  Otto  U tel. [phone],  CALLED  [facility] tel. [phone] 02/24/2023, 08:24, RB PERF. RESULTS CALLED TO: RN  [id number]  1 of 2 for Blood Culture x 2 sites order. Per Hospital  Policy: Only change Specimen Src: to \"Line\" if specified by  physician order.  Right AC    Susceptibility       Beta hemolytic streptococcus group a (1)       Antibiotic Interpretation Microscan   Method Status    Penicillin Sensitive 0.032 mcg/mL E-TEST Final    Cefotaxime Sensitive 0.032 mcg/mL E-TEST Final    Clindamycin Sensitive - mcg/mL E-TEST Final                       Culture Wound With Gram Stain [[id number]]  (Abnormal) Collected: 02/22/23 0930    Order Status: Completed Specimen: Wound from Left Leg Updated: 02/24/23 1046     Significant Indicator POS     Source WND     Site LEFT LEG     Culture Result -     Gram Stain Result Rare Gram " positive cocci.  Rare WBCs.       Culture Result Beta Hemolytic Streptococcus group A  Heavy growth      Narrative:      CALL  Otto  [facility] tel. [phone],  CALLED  [facility] tel. [phone] 02/23/2023, 06:40, RB PERF. RESULTS CALLED TO: RN  [id number]    BLOOD CULTURE [[id number]] Collected: 02/24/23 0400    Order Status: Canceled Specimen: Other from Peripheral     BLOOD CULTURE [[id number]] Collected: 02/24/23 0106    Order Status: Canceled Specimen: Other from Peripheral     GRAM STAIN [[id number]] Collected: 02/22/23 1417    Order Status: Completed Specimen: Body Fluid Updated: 02/23/23 1640     Significant Indicator .     Source BF     Site left knee deep     Gram Stain Result No organisms seen.    Narrative:      1 knee body fluid  left knee deep  Surgery - swabs received    GRAM STAIN [[id number]] Collected: 02/22/23 1426    Order Status: Completed Specimen: Body Fluid Updated: 02/23/23 1640     Significant Indicator .     Source BF     Site left knee deep     Gram Stain Result Few WBCs.  No organisms seen.      Narrative:      2 knee body fluid  left knee deep  Surgery - swabs received    GRAM STAIN [[id number]] Collected: 02/22/23 1428    Order Status: Completed Specimen: Tissue Updated: 02/23/23 1629     Significant Indicator .     Source TISS     Site Left Knee Deep     Gram Stain Result Rare WBCs.  No organisms seen.      Narrative:      3 knee tissue  left knee deep  Surgery Specimen    Blood Culture [[id number]] Collected: 02/22/23 2015    Order Status: Completed Specimen: Blood from Peripheral Updated: 02/23/23 0644     Significant Indicator NEG     Source BLD     Site PERIPHERAL     Culture Result No Growth  Note: Blood cultures are incubated for 5 days and  are monitored continuously.Positive blood cultures  are called to the RN and reported as soon as  they are identified.  Blood culture testing and Gram stain, if indicated, are  performed at [facility], [address] [place]  "[place].  Positive blood cultures are  sent to [facility], [address] [place] [address], for organism identification and  susceptibility testing.      Narrative:      From different peripheral sites, if not done within the last  24 hours (Per Hospital Policy: Only change specimen source to  \"Line\" if specified by physician order)  Right Hand    Blood Culture [[id number]] Collected: 02/22/23 2335    Order Status: Completed Specimen: Blood from Peripheral Updated: 02/23/23 0644     Significant Indicator NEG     Source BLD     Site PERIPHERAL     Culture Result No Growth  Note: Blood cultures are incubated for 5 days and  are monitored continuously.Positive blood cultures  are called to the RN and reported as soon as  they are identified.  Blood culture testing and Gram stain, if indicated, are  performed at [facility], [address] [place] [address].  Positive blood cultures are  sent to [facility], [address] [place] [address], for organism identification and  susceptibility testing.      Narrative:      From different peripheral sites, if not done within the last  24 hours (Per Hospital Policy: Only change specimen source to  \"Line\" if specified by physician order)  Left Hand    GRAM STAIN [[id number]] Collected: 02/22/23 0930    Order Status: Completed Specimen: Wound Updated: 02/23/23 0152     Significant Indicator .     Source WND     Site LEFT LEG     Gram Stain Result Rare Gram positive cocci.  Rare WBCs.              Assessment:  Raymundo Jackson is a 72 y.o. male patient admitted with group A strep bacteremia from infected left prosthetic knee joint.  Possible source is right middle finger wound     Pertinent Diagnoses:  Sepsis, resolved  Group A Strep bacteremia  Left knee prosthetic joint infection  Left leg and foot cellulitis    Plan:  -Continue IV Unasyn for now  -Repeat blood cultures x2 from 2/22 no growth till date  -Plan is for " repeat I&D with poly exchange on Tuesday.  We will follow along  -Worsening exam of left leg and foot despite antibiotics noted on 2/26 is concerning.  Recommend CT with contrast to look for underlying abscess -pending. Adde IV clindamycin 900 mg every 8 hours on 2/26, some clinical improvement noted on today's exam    Plan was discussed with the primary team, Dr. Noel     Disposition: Patient prefers eventual discharge to home with home IV antibiotics.  Paper orders for IV ceftriaxone 2 g every 24 hours through 4/10/2023 faxed to CM on 2/26  At least weekly CBC with differential, CMP while on IV antibiotics  Following above IV antibiotic course, anticipate oral antibiotics for approximately 3-6 months or longer  Need for PICC line: Yes but recommend waiting after Tuesday's surgery to avoid seeding     ID will follow.     ID clinic follow-up in 2 to 3 weeks.  Okay to schedule with NP Shell

## 2023-02-27 NOTE — ASSESSMENT & PLAN NOTE
Due to fluids needed for sepsis, he now has been spacing the lower extremities and perineal region  Follow labs  Resolved

## 2023-02-27 NOTE — CARE PLAN
Problem: Hemodynamics  Goal: Patient's hemodynamics, fluid balance and neurologic status will be stable or improve  Outcome: Progressing     Problem: Physical Regulation  Goal: Diagnostic test results will improve  Outcome: Progressing     Problem: Fall Risk  Goal: Patient will remain free from falls  2/27/2023 1529 by Jerri Hays RReN.  Outcome: Progressing  2/27/2023 1527 by Jerri Hays R.N.  Outcome: Progressing     Problem: Pain - Standard  Goal: Alleviation of pain or a reduction in pain to the patient’s comfort goal  2/27/2023 1529 by Jerri Hays R.N.  Outcome: Progressing  2/27/2023 1527 by Jerri Hays R.N.  Outcome: Progressing     Problem: Psychosocial  Goal: Patient's ability to verbalize feelings about condition will improve  Outcome: Progressing   The patient is Stable - Low risk of patient condition declining or worsening    Shift Goals  Clinical Goals: patient will reduce pain lesser to 3/10, free from fall  Patient Goals: Pain will be control, rest comfortably  Family Goals: n/a    Progress made toward(s) clinical / shift goals:  Pt pain scale is 0/10, ambulated to hallway using front wheel walker assisted by the physical therapist, tolerated well. Free from any fall throughout the shift.    Patient is not progressing towards the following goals:n/a

## 2023-02-27 NOTE — DISCHARGE PLANNING
"[facility] TCN chart review completed. Collaborated with DALTON Jc. Current discharge considerations are home with outpatient PT and HH (wound care) and home infusion. Patient seen at bedside. He is currently on IV ABX (Unasyn). He stated he prefers home infusion over outpatient infusion, but is hoping MD switched to PO ABX by the time of discharge. Per MD, \"Plan repeat i and D and poly exchange early next week\" (possibly Tuesday).    PT eval completed with recommendation for outpatient therapy. OT eval completed with recs for no further needs.    TCN will continue to follow and collaborate with discharge planning team as additional post acute needs arise. Thank you.    Completed:  PT recommends OP PT - 2/23  OT recommends home with no further needs - 2/23  HH Choice obtained   Nursing - 2/24  [facility] referral sent - 2/23/2023    *Addendum 1600 - ID cosult with recs for \"IV ceftriaxone 2 g every 24 hours through 4/10/2023. Following above IV antibiotic course, anticipate oral antibiotics for approximately 3-6 months or longer.\" TCN to obtain home infusion choice.   "

## 2023-02-27 NOTE — PROGRESS NOTES
Hospital Medicine Daily Progress Note    Date of Service  2/27/2023    Chief Complaint  Raymundo Jackson is a 72 y.o. male admitted 2/22/2023 with left knee pain and fever    Hospital Course  History of recent left total knee arthroplasty who has been recovering well until 3 days prior to presentation when he noted profuse drainage from his surgical wound followed by sudden onset severe pain with weightbearing.  He presented to the ER and was found to have sepsis secondary to septic arthritis and his prosthetic joint.  Orthopedic surgery was consulted and they recommended emergent I&D    Interval Problem Update  2/23: Tolerating antibiotics, cultures growing strep, for possible discontinue vancomycin.  Lactate still high, 1 L fluid bolus repeated.  Blood pressure .  Blood Cx + for strep in 2 of 2 cx    2/24: ID rec continue ctx, Lactate improved to 2.1.  Pain improving.  Repeat blood cx drawn to eval if negative.  Planning repeat washout next week.    2/25: WBC improving change pain meds to tramadol.  Add gabapentin.  Discussed with ID, change antibiotics to Unasyn for less C. difficile risk.  Repeat blood cultures are negative so far.      2/26: Fluids discontinued.  Tolerating Unasyn.  Shooting pain in left lower extremity improving.  D/W wife at bedside    2/27: WBC stable, clindamycin added yesterday due to lower leg swelling and concern for possible neck fashion.  CT of the left lower extremity is pending to ensure no abscess..  Both lower extremities and perineum are swollen, will need Lasix if creatinine is stable tomorrow after CTA.  N.p.o. at midnight for repeat left knee washout tomorrow    I have discussed this patient's plan of care and discharge plan at IDT rounds today with Case Management, Nursing, Nursing leadership, and other members of the IDT team.    Consultants/Specialty  orthopedics    Code Status  Full Code    Disposition  Patient is not medically cleared for discharge.   Anticipate  discharge to to home with organized home healthcare and close outpatient follow-up.  I have placed the appropriate orders for post-discharge needs.    Review of Systems  Review of Systems   Constitutional:  Negative for chills, fever (Resolved) and malaise/fatigue.   HENT:  Negative for sore throat.    Respiratory:  Negative for cough and shortness of breath.    Cardiovascular:  Positive for leg swelling. Negative for chest pain and palpitations.   Gastrointestinal:  Negative for abdominal pain, blood in stool, diarrhea, heartburn, nausea and vomiting.   Genitourinary:  Negative for dysuria and frequency.   Musculoskeletal:  Positive for joint pain and myalgias. Negative for back pain.   Skin:         Finger wound, wrapped   Neurological:  Negative for dizziness, focal weakness, weakness and headaches.   Psychiatric/Behavioral:  Negative for depression and hallucinations.    All other systems reviewed and are negative.     Physical Exam  Temp:  [36.4 °C (97.5 °F)-37.1 °C (98.8 °F)] 37.1 °C (98.8 °F)  Pulse:  [57-65] 65  Resp:  [18] 18  BP: (133-154)/(73-75) 136/73  SpO2:  [93 %-96 %] 96 %    Physical Exam  Constitutional:       Appearance: Normal appearance.   HENT:      Head: Normocephalic and atraumatic.      Nose: Nose normal.      Mouth/Throat:      Mouth: Mucous membranes are moist.   Eyes:      Extraocular Movements: Extraocular movements intact.      Pupils: Pupils are equal, round, and reactive to light.   Cardiovascular:      Rate and Rhythm: Normal rate and regular rhythm.      Heart sounds: No murmur heard.  Pulmonary:      Effort: Pulmonary effort is normal. No respiratory distress.      Breath sounds: Normal breath sounds. No stridor.   Abdominal:      General: Abdomen is flat. Bowel sounds are normal. There is no distension.      Palpations: Abdomen is soft.      Tenderness: There is no abdominal tenderness.   Musculoskeletal:         General: Swelling present. No tenderness or deformity.      Cervical  back: Normal range of motion and neck supple.      Right lower leg: Edema present.      Left lower leg: Edema present.      Comments: Left lower leg has medial erythema and swelling, wound VAC in place over left knee   Skin:     General: Skin is warm and dry.      Coloration: Skin is not pale.      Findings: Erythema present.      Comments: Finger wound wrapped   Neurological:      General: No focal deficit present.      Mental Status: He is alert and oriented to person, place, and time. Mental status is at baseline.   Psychiatric:         Mood and Affect: Mood normal.         Behavior: Behavior normal.         Thought Content: Thought content normal.       Fluids    Intake/Output Summary (Last 24 hours) at 2/27/2023 1216  Last data filed at 2/27/2023 0919  Gross per 24 hour   Intake 1560 ml   Output 4820 ml   Net -3260 ml         Laboratory  Recent Labs     02/25/23  0147 02/26/23  0106 02/27/23  0121   WBC 12.8* 11.0* 11.5*   RBC 3.27* 3.18* 3.42*   HEMOGLOBIN 10.4* 10.0* 10.9*   HEMATOCRIT 31.2* 29.9* 32.5*   MCV 95.4 94.0 95.0   MCH 31.8 31.4 31.9   MCHC 33.3* 33.4* 33.5*   RDW 45.8 45.3 46.8   PLATELETCT 263 289 367   MPV 11.5 11.2 11.0       Recent Labs     02/25/23  0147 02/26/23  0106 02/27/23  0121   SODIUM 137 137 135   POTASSIUM 3.6 3.6 4.0   CHLORIDE 106 106 104   CO2 22 22 21   GLUCOSE 146* 170* 138*   BUN 17 15 13   CREATININE 0.85 0.75 0.82   CALCIUM 7.9* 7.5* 7.7*                       Imaging  DX-CHEST-PORTABLE (1 VIEW)   Final Result      1.  No acute cardiopulmonary abnormality identified.      2.  Enlarged cardiac silhouette      DX-KNEE 2- LEFT   Final Result         1. Moderate joint effusion, new since prior.      CT-EXTREMITY, LOWER WITH LEFT    (Results Pending)          Assessment/Plan  * Infection of prosthetic knee joint, initial encounter ([facility])- (present on admission)  Assessment & Plan  Presented acutely with sepsis secondary to left total knee infection w strep bacteramia  Surgical  washout 2/22  Repeat washout planned for tomorrow 2/28  Changed to Unasyn (lower C Dif) + Clindamycin for concern for nec fasc (f/u CT)  Discussed with ID  L knee and blood cx 2/22 + Group A Strep  Will need 6 weeks of IV antibiotics from date of his last surgery  LE swelling below knee, CT today to rule out abscess / fluid collection which would need to be washed out  Wait for PICC until after next surgery to avoid seeding      Anasarca  Assessment & Plan  Due to fluids needed for sepsis, he now has been spacing the lower extremities and perineal region  He contrast today so we will hold off on diuresis  If creatinine is stable tomorrow, initiate low-dose Lasix    Streptococcal bacteremia  Assessment & Plan  Blood cultures from 2/22 returned positive in 2 of 2 cultures with strep continue IV ceftriaxone  Discussed with ID  Repeat blood cultures on 2/24 NGTD    Finger wound, simple, open- (present on admission)  Assessment & Plan  Dressing changes    Septic arthritis (HCC)- (present on admission)  Assessment & Plan  Secondary to recent total knee which was done in early February by Dr. James  S/P washout 2/22 and repeat planned for next week  Planning for 6 weeks IV  Continue Unasyn  Discussed with ID  Added gabapentin and tramadol for pain    Sepsis (HCC)  Assessment & Plan  He had profound lactic acidosis due to septic left knee  Status post washout for source control on 2/22, repeat washout planned for tmrw  NPO at MN  Continue empiric antibiotics, Unasyn and Clinda  Blood and L knee cultures so far with group A strep  He has fluid overload and lower extremity/perineal edema from fluids given for sepsis, he will need diuresis prior to discharge however will wait until tomorrow to ensure renal function is stable after contrast today    Dyslipidemia- (present on admission)  Assessment & Plan  Continue home ASA and Zocor    Prediabetes  Assessment & Plan  Prediabetic A1c 6.3  Metformin on hold         VTE  prophylaxis: SCDs/TEDs and enoxaparin ppx    I have performed a physical exam and reviewed and updated ROS and Plan today (2/27/2023). In review of yesterday's note (2/26/2023), there are no changes except as documented above.    Total time:  52 minutes.  I spent greater than 50% of the time for patient care, counseling, and coordination on this date, including unit/floor time, and face-to-face time with the patient as per interval events and assessment and plan above

## 2023-02-27 NOTE — DISCHARGE PLANNING
Case Management Discharge Planning    Admission Date: 2/22/2023  GMLOS: 4.8  ALOS: 4    6-Clicks ADL Score: 22  6-Clicks Mobility Score: 12  PT and/or OT Eval ordered: Yes  Post-acute Referrals Ordered: No  Post-acute Choice Obtained: No  Has referral(s) been sent to post-acute provider:  No      Anticipated Discharge Dispo: Discharge Disposition: D/T to home under HHA care in anticipation of covered skilled care (06)    DME Needed: No    Action(s) Taken: Updated Provider/Nurse on Discharge Plan    1017: RN CM requested Camilo DAS fax IV infusion order to this RN CM.  RN CM to request HH order from MD.     1212: RN DALTON requested IV order be re-faxed to this RN CM. Fany STERLING collected choice for [facility].  RN DALTON collected HH choice for [facility]. RN CM requested [facility] HH order.     1355: Choice for [facility] and [facility] faxed to D[facility].      Escalations Completed: None    Medically Clear: No    Next Steps: IV infusion and HH set up     Barriers to Discharge: Medical clearance and Outpatient Infusion required

## 2023-02-27 NOTE — THERAPY
Occupational Therapy Contact Note    Patient Name: Raymundo Jackson  Age:  72 y.o., Sex:  male  Medical Record #: [id number]  Today's Date: 2/27/2023    Discussed missed therapy with RN, PT       02/27/23 1519   Interdisciplinary Plan of Care Collaboration   Collaboration Comments Attempted therapy this am, pt refused 2/2 waiting for CT scan supposed to happen momentarily.  Later in pm pt declined as he is tired and in more pain after working with PT.  Pt to have surgery tomorrow, will revisit if he has further OT needs post operatively as appropriate.

## 2023-02-27 NOTE — THERAPY
Physical Therapy   Daily Treatment     Patient Name: Raymundo Jackson  Age:  72 y.o., Sex:  male  Medical Record #: [id number]  Today's Date: 2/26/2023     Precautions  Precautions: Fall Risk;Weight Bearing As Tolerated Left Lower Extremity    Assessment  Pt is scheduled for and I&D of the L LE and will most likely go home with a PIC line and antibiotics and will be a canidate for a revision when cleared . Pt states he has not been up for 48 hours and he required min A with L LE to get to EOB. Pt using a sheet to help assist L LE . Pt requires bed elevated and is able to transfer with min a as well as walking in room with SPV. Pt is able to walk 6 laps today ( 120') but states his knee seems more painful than 2 days ago when he walked with PT. Pt is able to transfer on /off the raised toilet with mod I using his FWW . I recommend pt receive Home health to assist with mobility and knee AROM /PROM prior to surgery .     Plan    Physical Therapy Treatment Plan  Physical Therapy Treatment Plan: Continue Current Treatment Plan    DC Equipment Recommendations: Front-Wheel Walker  Discharge Recommendations: Recommend home health for continued physical therapy services      Subjective  Pt is a nice calm and friendly patient who will benefit from PT assist with knee AROM and LE strength with regular walking        Objective     02/26/23 1700   Gait Analysis   Gait Level Of Assist Supervised   Assistive Device Front Wheel Walker   Distance (Feet) 120   # of Times Distance was Traveled 1   Deviation Decreased Toe Off;Step To;Antalgic   # of Stairs Climbed 0   Weight Bearing Status WBAT L LE   Comments pt able to ambulate 6 laps in room and  noted increased pain compared to walking 2 days ago but wanted to walk and then went into the bathroom and is able to transfer with mod I   Functional Mobility   Sit to Stand Minimal Assist   Bed, Chair, Wheelchair Transfer Contact Guard Assist   Toilet Transfers Modified Independent    Transfer Method Stand Step   Mobility EOB, SIT<>STD, AMB, to toilet BTB   Comments MIN A with L LE BTB   Short Term Goals    Goal Outcome # 1 Progressing slower than expected   Goal Outcome # 2 Progressing slower than expected   Goal Outcome # 3 Progressing slower than expected   Goal Outcome # 4 Progressing as expected   Goal Outcome # 5 Goal met   Anticipated Discharge Equipment and Recommendations   DC Equipment Recommendations Front-Wheel Walker   Discharge Recommendations Recommend home health for continued physical therapy services   Session Information   Date / Session Number  2/26-3( 3/7, 3/1)

## 2023-02-27 NOTE — CARE PLAN
The patient is Stable - Low risk of patient condition declining or worsening    Shift Goals  Clinical Goals: Patient will report pain improvement after interventions this shift  Patient Goals: Pain management, rest comfortably    Progress made toward(s) clinical / shift goals:  Patient reports pain improvement after interventions, see MAR.    Patient is not progressing towards the following goals: N/A

## 2023-02-27 NOTE — DISCHARGE PLANNING
H/SCP TCN chart review completed. Collaborated with MASOOD Finch. Discussed current discharge plan considerations, noted to home with HHC and need for IV infusion (please see Infectious Disease, Dr. Page, note 2/27/2023 at 7:24AM). Per ID  note, plan noted to home with IV antibiotics through 4/10/2023.     TCN met with patient at bedside, with wife Carin, present. Discussed current discharge considerations, with patient stating preference for home with HH and IV infusions with wife noting she will be available to assist. Choice proactively obtained for IV infusion and given to CM. Patient advised he has a [facility] benefit and would like CM to be notified. TCN collaborated with CM at patient request to notify of patient report of [facility] benefit.     TCN will continue to follow and collaborate with discharge planning team as additional post acute needs arise. Thank you.    Completed  PT with recommendations for HH and FWW on 2/26/2023  Choice obtained: HH 2/24. IV infusion today, 2/27/2023. Appreciate CM following regarding HH and IV infusion orders  [facility] referral sent 2/23/2023

## 2023-02-28 ENCOUNTER — ANESTHESIA (OUTPATIENT)
Dept: SURGERY | Facility: MEDICAL CENTER | Age: 73
DRG: 485 | End: 2023-02-28
Payer: MEDICARE

## 2023-02-28 LAB
ALBUMIN SERPL BCP-MCNC: 2.6 G/DL (ref 3.2–4.9)
BACTERIA BLD CULT: NORMAL
BASOPHILS # BLD AUTO: 0.8 % (ref 0–1.8)
BASOPHILS # BLD: 0.08 K/UL (ref 0–0.12)
BUN SERPL-MCNC: 13 MG/DL (ref 8–22)
CALCIUM ALBUM COR SERPL-MCNC: 9.5 MG/DL (ref 8.5–10.5)
CALCIUM SERPL-MCNC: 8.4 MG/DL (ref 8.4–10.2)
CHLORIDE SERPL-SCNC: 101 MMOL/L (ref 96–112)
CO2 SERPL-SCNC: 24 MMOL/L (ref 20–33)
CREAT SERPL-MCNC: 0.91 MG/DL (ref 0.5–1.4)
EOSINOPHIL # BLD AUTO: 0.23 K/UL (ref 0–0.51)
EOSINOPHIL NFR BLD: 2.2 % (ref 0–6.9)
ERYTHROCYTE [DISTWIDTH] IN BLOOD BY AUTOMATED COUNT: 46.5 FL (ref 35.9–50)
GFR SERPLBLD CREATININE-BSD FMLA CKD-EPI: 89 ML/MIN/1.73 M 2
GLUCOSE SERPL-MCNC: 129 MG/DL (ref 65–99)
HCT VFR BLD AUTO: 35.2 % (ref 42–52)
HGB BLD-MCNC: 11.6 G/DL (ref 14–18)
IMM GRANULOCYTES # BLD AUTO: 0.56 K/UL (ref 0–0.11)
IMM GRANULOCYTES NFR BLD AUTO: 5.3 % (ref 0–0.9)
LYMPHOCYTES # BLD AUTO: 1.71 K/UL (ref 1–4.8)
LYMPHOCYTES NFR BLD: 16.3 % (ref 22–41)
MAGNESIUM SERPL-MCNC: 2.3 MG/DL (ref 1.5–2.5)
MCH RBC QN AUTO: 31.4 PG (ref 27–33)
MCHC RBC AUTO-ENTMCNC: 33 G/DL (ref 33.7–35.3)
MCV RBC AUTO: 95.1 FL (ref 81.4–97.8)
MONOCYTES # BLD AUTO: 1.07 K/UL (ref 0–0.85)
MONOCYTES NFR BLD AUTO: 10.2 % (ref 0–13.4)
NEUTROPHILS # BLD AUTO: 6.82 K/UL (ref 1.82–7.42)
NEUTROPHILS NFR BLD: 65.2 % (ref 44–72)
NRBC # BLD AUTO: 0 K/UL
NRBC BLD-RTO: 0 /100 WBC
PHOSPHATE SERPL-MCNC: 3.7 MG/DL (ref 2.5–4.5)
PLATELET # BLD AUTO: 444 K/UL (ref 164–446)
PMV BLD AUTO: 10.6 FL (ref 9–12.9)
POTASSIUM SERPL-SCNC: 4.3 MMOL/L (ref 3.6–5.5)
RBC # BLD AUTO: 3.7 M/UL (ref 4.7–6.1)
SIGNIFICANT IND 70042: NORMAL
SITE SITE: NORMAL
SODIUM SERPL-SCNC: 134 MMOL/L (ref 135–145)
SOURCE SOURCE: NORMAL
WBC # BLD AUTO: 10.5 K/UL (ref 4.8–10.8)

## 2023-02-28 PROCEDURE — 700111 HCHG RX REV CODE 636 W/ 250 OVERRIDE (IP): Performed by: INTERNAL MEDICINE

## 2023-02-28 PROCEDURE — 64447 NJX AA&/STRD FEMORAL NRV IMG: CPT | Performed by: ORTHOPAEDIC SURGERY

## 2023-02-28 PROCEDURE — 01402 ANES OPN/ARTH TOT KNE ARTHRP: CPT | Performed by: INTERNAL MEDICINE

## 2023-02-28 PROCEDURE — 700102 HCHG RX REV CODE 250 W/ 637 OVERRIDE(OP): Performed by: STUDENT IN AN ORGANIZED HEALTH CARE EDUCATION/TRAINING PROGRAM

## 2023-02-28 PROCEDURE — 700105 HCHG RX REV CODE 258: Performed by: INTERNAL MEDICINE

## 2023-02-28 PROCEDURE — 700111 HCHG RX REV CODE 636 W/ 250 OVERRIDE (IP): Performed by: ORTHOPAEDIC SURGERY

## 2023-02-28 PROCEDURE — A9270 NON-COVERED ITEM OR SERVICE: HCPCS | Performed by: INTERNAL MEDICINE

## 2023-02-28 PROCEDURE — 80069 RENAL FUNCTION PANEL: CPT

## 2023-02-28 PROCEDURE — 160002 HCHG RECOVERY MINUTES (STAT): Performed by: ORTHOPAEDIC SURGERY

## 2023-02-28 PROCEDURE — 160042 HCHG SURGERY MINUTES - EA ADDL 1 MIN LEVEL 5: Performed by: ORTHOPAEDIC SURGERY

## 2023-02-28 PROCEDURE — 0SUW09Z SUPPLEMENT LEFT KNEE JOINT, TIBIAL SURFACE WITH LINER, OPEN APPROACH: ICD-10-PCS | Performed by: ORTHOPAEDIC SURGERY

## 2023-02-28 PROCEDURE — 99100 ANES PT EXTEME AGE<1 YR&>70: CPT | Performed by: INTERNAL MEDICINE

## 2023-02-28 PROCEDURE — 99232 SBSQ HOSP IP/OBS MODERATE 35: CPT | Performed by: INTERNAL MEDICINE

## 2023-02-28 PROCEDURE — 160048 HCHG OR STATISTICAL LEVEL 1-5: Performed by: ORTHOPAEDIC SURGERY

## 2023-02-28 PROCEDURE — 160031 HCHG SURGERY MINUTES - 1ST 30 MINS LEVEL 5: Performed by: ORTHOPAEDIC SURGERY

## 2023-02-28 PROCEDURE — 700102 HCHG RX REV CODE 250 W/ 637 OVERRIDE(OP): Performed by: INTERNAL MEDICINE

## 2023-02-28 PROCEDURE — 0SPD09Z REMOVAL OF LINER FROM LEFT KNEE JOINT, OPEN APPROACH: ICD-10-PCS | Performed by: ORTHOPAEDIC SURGERY

## 2023-02-28 PROCEDURE — 02HV33Z INSERTION OF INFUSION DEVICE INTO SUPERIOR VENA CAVA, PERCUTANEOUS APPROACH: ICD-10-PCS | Performed by: ORTHOPAEDIC SURGERY

## 2023-02-28 PROCEDURE — A9270 NON-COVERED ITEM OR SERVICE: HCPCS | Performed by: STUDENT IN AN ORGANIZED HEALTH CARE EDUCATION/TRAINING PROGRAM

## 2023-02-28 PROCEDURE — 700101 HCHG RX REV CODE 250: Performed by: ORTHOPAEDIC SURGERY

## 2023-02-28 PROCEDURE — 94760 N-INVAS EAR/PLS OXIMETRY 1: CPT

## 2023-02-28 PROCEDURE — 36415 COLL VENOUS BLD VENIPUNCTURE: CPT

## 2023-02-28 PROCEDURE — 83735 ASSAY OF MAGNESIUM: CPT

## 2023-02-28 PROCEDURE — 700105 HCHG RX REV CODE 258: Performed by: ORTHOPAEDIC SURGERY

## 2023-02-28 PROCEDURE — 700101 HCHG RX REV CODE 250: Performed by: INTERNAL MEDICINE

## 2023-02-28 PROCEDURE — 502000 HCHG MISC OR IMPLANTS RC 0278: Performed by: ORTHOPAEDIC SURGERY

## 2023-02-28 PROCEDURE — 160035 HCHG PACU - 1ST 60 MINS PHASE I: Performed by: ORTHOPAEDIC SURGERY

## 2023-02-28 PROCEDURE — C1776 JOINT DEVICE (IMPLANTABLE): HCPCS | Performed by: ORTHOPAEDIC SURGERY

## 2023-02-28 PROCEDURE — 160009 HCHG ANES TIME/MIN: Performed by: ORTHOPAEDIC SURGERY

## 2023-02-28 PROCEDURE — 85025 COMPLETE CBC W/AUTO DIFF WBC: CPT

## 2023-02-28 PROCEDURE — 770001 HCHG ROOM/CARE - MED/SURG/GYN PRIV*

## 2023-02-28 PROCEDURE — 64447 NJX AA&/STRD FEMORAL NRV IMG: CPT | Mod: 59,LT | Performed by: INTERNAL MEDICINE

## 2023-02-28 DEVICE — IMPLANTABLE DEVICE: Type: IMPLANTABLE DEVICE | Site: KNEE | Status: FUNCTIONAL

## 2023-02-28 RX ORDER — EPINEPHRINE 1 MG/ML(1)
AMPUL (ML) INJECTION
Status: DISCONTINUED | OUTPATIENT
Start: 2023-02-28 | End: 2023-02-28 | Stop reason: HOSPADM

## 2023-02-28 RX ORDER — ROPIVACAINE HYDROCHLORIDE 5 MG/ML
INJECTION, SOLUTION EPIDURAL; INFILTRATION; PERINEURAL
Status: DISCONTINUED | OUTPATIENT
Start: 2023-02-28 | End: 2023-02-28 | Stop reason: HOSPADM

## 2023-02-28 RX ORDER — DIPHENHYDRAMINE HYDROCHLORIDE 50 MG/ML
12.5 INJECTION INTRAMUSCULAR; INTRAVENOUS
Status: DISCONTINUED | OUTPATIENT
Start: 2023-02-28 | End: 2023-02-28 | Stop reason: HOSPADM

## 2023-02-28 RX ORDER — ONDANSETRON 2 MG/ML
4 INJECTION INTRAMUSCULAR; INTRAVENOUS
Status: DISCONTINUED | OUTPATIENT
Start: 2023-02-28 | End: 2023-02-28 | Stop reason: HOSPADM

## 2023-02-28 RX ORDER — ROPIVACAINE HYDROCHLORIDE 5 MG/ML
INJECTION, SOLUTION EPIDURAL; INFILTRATION; PERINEURAL
Status: COMPLETED | OUTPATIENT
Start: 2023-02-28 | End: 2023-02-28

## 2023-02-28 RX ORDER — SODIUM CHLORIDE, SODIUM LACTATE, POTASSIUM CHLORIDE, CALCIUM CHLORIDE 600; 310; 30; 20 MG/100ML; MG/100ML; MG/100ML; MG/100ML
INJECTION, SOLUTION INTRAVENOUS CONTINUOUS
Status: ACTIVE | OUTPATIENT
Start: 2023-02-28 | End: 2023-02-28

## 2023-02-28 RX ORDER — OXYCODONE HCL 5 MG/5 ML
10 SOLUTION, ORAL ORAL
Status: DISCONTINUED | OUTPATIENT
Start: 2023-02-28 | End: 2023-02-28 | Stop reason: HOSPADM

## 2023-02-28 RX ORDER — KETOROLAC TROMETHAMINE 30 MG/ML
INJECTION, SOLUTION INTRAMUSCULAR; INTRAVENOUS PRN
Status: DISCONTINUED | OUTPATIENT
Start: 2023-02-28 | End: 2023-02-28 | Stop reason: SURG

## 2023-02-28 RX ORDER — TRANEXAMIC ACID 100 MG/ML
INJECTION, SOLUTION INTRAVENOUS PRN
Status: DISCONTINUED | OUTPATIENT
Start: 2023-02-28 | End: 2023-02-28 | Stop reason: SURG

## 2023-02-28 RX ORDER — HALOPERIDOL 5 MG/ML
1 INJECTION INTRAMUSCULAR
Status: DISCONTINUED | OUTPATIENT
Start: 2023-02-28 | End: 2023-02-28 | Stop reason: HOSPADM

## 2023-02-28 RX ORDER — HYDROMORPHONE HYDROCHLORIDE 1 MG/ML
0.2 INJECTION, SOLUTION INTRAMUSCULAR; INTRAVENOUS; SUBCUTANEOUS
Status: DISCONTINUED | OUTPATIENT
Start: 2023-02-28 | End: 2023-02-28 | Stop reason: HOSPADM

## 2023-02-28 RX ORDER — CEFAZOLIN SODIUM 1 G/3ML
INJECTION, POWDER, FOR SOLUTION INTRAMUSCULAR; INTRAVENOUS PRN
Status: DISCONTINUED | OUTPATIENT
Start: 2023-02-28 | End: 2023-02-28 | Stop reason: SURG

## 2023-02-28 RX ORDER — MORPHINE SULFATE 0.5 MG/ML
INJECTION, SOLUTION EPIDURAL; INTRATHECAL; INTRAVENOUS
Status: DISCONTINUED | OUTPATIENT
Start: 2023-02-28 | End: 2023-02-28 | Stop reason: HOSPADM

## 2023-02-28 RX ORDER — KETOROLAC TROMETHAMINE 30 MG/ML
INJECTION, SOLUTION INTRAMUSCULAR; INTRAVENOUS
Status: DISCONTINUED | OUTPATIENT
Start: 2023-02-28 | End: 2023-02-28 | Stop reason: HOSPADM

## 2023-02-28 RX ORDER — LIDOCAINE HYDROCHLORIDE 20 MG/ML
INJECTION, SOLUTION EPIDURAL; INFILTRATION; INTRACAUDAL; PERINEURAL PRN
Status: DISCONTINUED | OUTPATIENT
Start: 2023-02-28 | End: 2023-02-28 | Stop reason: SURG

## 2023-02-28 RX ORDER — MEPERIDINE HYDROCHLORIDE 25 MG/ML
12.5 INJECTION INTRAMUSCULAR; INTRAVENOUS; SUBCUTANEOUS
Status: DISCONTINUED | OUTPATIENT
Start: 2023-02-28 | End: 2023-02-28 | Stop reason: HOSPADM

## 2023-02-28 RX ORDER — HYDROMORPHONE HYDROCHLORIDE 1 MG/ML
0.4 INJECTION, SOLUTION INTRAMUSCULAR; INTRAVENOUS; SUBCUTANEOUS
Status: DISCONTINUED | OUTPATIENT
Start: 2023-02-28 | End: 2023-02-28 | Stop reason: HOSPADM

## 2023-02-28 RX ORDER — HYDRALAZINE HYDROCHLORIDE 20 MG/ML
5 INJECTION INTRAMUSCULAR; INTRAVENOUS
Status: DISCONTINUED | OUTPATIENT
Start: 2023-02-28 | End: 2023-02-28 | Stop reason: HOSPADM

## 2023-02-28 RX ORDER — DEXAMETHASONE SODIUM PHOSPHATE 4 MG/ML
INJECTION, SOLUTION INTRA-ARTICULAR; INTRALESIONAL; INTRAMUSCULAR; INTRAVENOUS; SOFT TISSUE PRN
Status: DISCONTINUED | OUTPATIENT
Start: 2023-02-28 | End: 2023-02-28 | Stop reason: SURG

## 2023-02-28 RX ORDER — ONDANSETRON 2 MG/ML
INJECTION INTRAMUSCULAR; INTRAVENOUS PRN
Status: DISCONTINUED | OUTPATIENT
Start: 2023-02-28 | End: 2023-02-28 | Stop reason: SURG

## 2023-02-28 RX ORDER — SODIUM CHLORIDE 9 MG/ML
INJECTION, SOLUTION INTRAMUSCULAR; INTRAVENOUS; SUBCUTANEOUS
Status: DISCONTINUED | OUTPATIENT
Start: 2023-02-28 | End: 2023-02-28 | Stop reason: HOSPADM

## 2023-02-28 RX ORDER — ACETAMINOPHEN 500 MG
1000 TABLET ORAL ONCE
Status: COMPLETED | OUTPATIENT
Start: 2023-02-28 | End: 2023-02-28

## 2023-02-28 RX ORDER — HYDROMORPHONE HYDROCHLORIDE 2 MG/ML
INJECTION, SOLUTION INTRAMUSCULAR; INTRAVENOUS; SUBCUTANEOUS PRN
Status: DISCONTINUED | OUTPATIENT
Start: 2023-02-28 | End: 2023-02-28 | Stop reason: SURG

## 2023-02-28 RX ORDER — HYDROMORPHONE HYDROCHLORIDE 1 MG/ML
0.1 INJECTION, SOLUTION INTRAMUSCULAR; INTRAVENOUS; SUBCUTANEOUS
Status: DISCONTINUED | OUTPATIENT
Start: 2023-02-28 | End: 2023-02-28 | Stop reason: HOSPADM

## 2023-02-28 RX ORDER — OXYCODONE HCL 5 MG/5 ML
5 SOLUTION, ORAL ORAL
Status: DISCONTINUED | OUTPATIENT
Start: 2023-02-28 | End: 2023-02-28 | Stop reason: HOSPADM

## 2023-02-28 RX ADMIN — KETOROLAC TROMETHAMINE 15 MG: 30 INJECTION, SOLUTION INTRAMUSCULAR at 14:41

## 2023-02-28 RX ADMIN — HYDROMORPHONE HYDROCHLORIDE 0.5 MG: 2 INJECTION INTRAMUSCULAR; INTRAVENOUS; SUBCUTANEOUS at 13:54

## 2023-02-28 RX ADMIN — CEFAZOLIN 2 G: 330 INJECTION, POWDER, FOR SOLUTION INTRAMUSCULAR; INTRAVENOUS at 13:57

## 2023-02-28 RX ADMIN — AMPICILLIN AND SULBACTAM 3 G: 1; 2 INJECTION, POWDER, FOR SOLUTION INTRAMUSCULAR; INTRAVENOUS at 05:15

## 2023-02-28 RX ADMIN — FENTANYL CITRATE 50 MCG: 50 INJECTION, SOLUTION INTRAMUSCULAR; INTRAVENOUS at 13:51

## 2023-02-28 RX ADMIN — ACETAMINOPHEN 1000 MG: 500 TABLET ORAL at 12:13

## 2023-02-28 RX ADMIN — GABAPENTIN 100 MG: 100 CAPSULE ORAL at 17:30

## 2023-02-28 RX ADMIN — ROPIVACAINE HYDROCHLORIDE 20 ML: 5 INJECTION, SOLUTION EPIDURAL; INFILTRATION; PERINEURAL at 12:45

## 2023-02-28 RX ADMIN — DAKIN'S SOLUTION 0.125% (QUARTER STRENGTH) 473 ML: 0.12 SOLUTION at 17:37

## 2023-02-28 RX ADMIN — AMPICILLIN AND SULBACTAM 3 G: 1; 2 INJECTION, POWDER, FOR SOLUTION INTRAMUSCULAR; INTRAVENOUS at 23:43

## 2023-02-28 RX ADMIN — CLINDAMYCIN PHOSPHATE 900 MG: 900 INJECTION, SOLUTION INTRAVENOUS at 21:25

## 2023-02-28 RX ADMIN — SODIUM CHLORIDE, POTASSIUM CHLORIDE, SODIUM LACTATE AND CALCIUM CHLORIDE: 600; 310; 30; 20 INJECTION, SOLUTION INTRAVENOUS at 13:51

## 2023-02-28 RX ADMIN — DAKIN'S SOLUTION 0.125% (QUARTER STRENGTH) 473 ML: 0.12 SOLUTION at 06:07

## 2023-02-28 RX ADMIN — DEXAMETHASONE SODIUM PHOSPHATE 8 MG: 4 INJECTION, SOLUTION INTRA-ARTICULAR; INTRALESIONAL; INTRAMUSCULAR; INTRAVENOUS; SOFT TISSUE at 13:54

## 2023-02-28 RX ADMIN — FENTANYL CITRATE 50 MCG: 50 INJECTION, SOLUTION INTRAMUSCULAR; INTRAVENOUS at 15:07

## 2023-02-28 RX ADMIN — AMPICILLIN AND SULBACTAM 3 G: 1; 2 INJECTION, POWDER, FOR SOLUTION INTRAMUSCULAR; INTRAVENOUS at 17:30

## 2023-02-28 RX ADMIN — PROPOFOL 180 MG: 10 INJECTION, EMULSION INTRAVENOUS at 13:54

## 2023-02-28 RX ADMIN — CLINDAMYCIN PHOSPHATE 900 MG: 900 INJECTION, SOLUTION INTRAVENOUS at 06:08

## 2023-02-28 RX ADMIN — TRANEXAMIC ACID 1000 MG: 100 INJECTION, SOLUTION INTRAVENOUS at 13:57

## 2023-02-28 RX ADMIN — TRANEXAMIC ACID 1000 MG: 100 INJECTION, SOLUTION INTRAVENOUS at 14:31

## 2023-02-28 RX ADMIN — LIDOCAINE HYDROCHLORIDE 100 MG: 20 INJECTION, SOLUTION EPIDURAL; INFILTRATION; INTRACAUDAL at 13:54

## 2023-02-28 RX ADMIN — FENTANYL CITRATE 50 MCG: 50 INJECTION, SOLUTION INTRAMUSCULAR; INTRAVENOUS at 14:31

## 2023-02-28 RX ADMIN — AMPICILLIN AND SULBACTAM 3 G: 1; 2 INJECTION, POWDER, FOR SOLUTION INTRAMUSCULAR; INTRAVENOUS at 11:08

## 2023-02-28 RX ADMIN — HYDROMORPHONE HYDROCHLORIDE 0.5 MG: 2 INJECTION INTRAMUSCULAR; INTRAVENOUS; SUBCUTANEOUS at 14:06

## 2023-02-28 RX ADMIN — ONDANSETRON 4 MG: 2 INJECTION INTRAMUSCULAR; INTRAVENOUS at 14:36

## 2023-02-28 RX ADMIN — FENTANYL CITRATE 50 MCG: 50 INJECTION, SOLUTION INTRAMUSCULAR; INTRAVENOUS at 15:29

## 2023-02-28 ASSESSMENT — PATIENT HEALTH QUESTIONNAIRE - PHQ9
1. LITTLE INTEREST OR PLEASURE IN DOING THINGS: NOT AT ALL
2. FEELING DOWN, DEPRESSED, IRRITABLE, OR HOPELESS: NOT AT ALL
SUM OF ALL RESPONSES TO PHQ9 QUESTIONS 1 AND 2: 0
2. FEELING DOWN, DEPRESSED, IRRITABLE, OR HOPELESS: NOT AT ALL
SUM OF ALL RESPONSES TO PHQ9 QUESTIONS 1 AND 2: 0
1. LITTLE INTEREST OR PLEASURE IN DOING THINGS: NOT AT ALL

## 2023-02-28 ASSESSMENT — ENCOUNTER SYMPTOMS
VOMITING: 0
HALLUCINATIONS: 0
HEARTBURN: 0
ABDOMINAL PAIN: 0
FEVER: 0
DEPRESSION: 0
WEAKNESS: 0
DIZZINESS: 0
PALPITATIONS: 0
BACK PAIN: 0
HEADACHES: 0
BLOOD IN STOOL: 0
SORE THROAT: 0
COUGH: 0
DIARRHEA: 0
MYALGIAS: 1
CHILLS: 0
SHORTNESS OF BREATH: 0
FOCAL WEAKNESS: 0
NAUSEA: 0

## 2023-02-28 ASSESSMENT — PAIN DESCRIPTION - PAIN TYPE
TYPE: SURGICAL PAIN

## 2023-02-28 NOTE — ANESTHESIA PREPROCEDURE EVALUATION
Case: [id number] Date/Time: 02/28/23 1245    Procedure: REVISION, TOTAL ARTHROPLASTY, KNEE, ALL COMPONENTS (Left)    Location:  OR  / SURGERY [facility]    Surgeons: Brodie Del Valle M.D.          Relevant Problems   Other   (positive) Infection of prosthetic knee joint, initial encounter (HCC)   (positive) Sepsis (HCC)   (positive) Septic arthritis (HCC)       Physical Exam    Airway   Mallampati: II  TM distance: >3 FB  Neck ROM: full       Cardiovascular - normal exam  Rhythm: regular  Rate: normal  (-) murmur     Dental - normal exam             Pulmonary - normal exam  Breath sounds clear to auscultation     Abdominal    Neurological - normal exam                 Anesthesia Plan    ASA 2       Plan - general and peripheral nerve block     Peripheral nerve block will be post-op pain control  Airway plan will be LMA          Induction: intravenous    Postoperative Plan: Postoperative administration of opioids is intended.    Pertinent diagnostic labs and testing reviewed    Informed Consent:    Anesthetic plan and risks discussed with patient.    Use of blood products discussed with: patient whom consented to blood products.

## 2023-02-28 NOTE — PROGRESS NOTES
Received report from night shift RN, assumed care of pt. JOESPH Flores. Pt states pain 2/10. No tingling, no numbness, no dizziness.   Pt updated on plan of care for the day. All questions answered. No other needs at this time. Call light and belongings within reach, bed locked and in lowest position. Pt educated to call for assistance. Hourly rounding in place.

## 2023-02-28 NOTE — CARE PLAN
The patient is Stable - Low risk of patient condition declining or worsening    Shift Goals  Clinical Goals: Pt will maintain strict NPO at midnight; continue on IV abx.  Patient Goals: sleep and rest  Family Goals: n/a    Progress made toward(s) clinical / shift goals:    Pt has no pain during shift; maintained on strict NPO; continued on IV abx, tolerated. Pt slept comfortably overnight.     Patient is not progressing towards the following goals:

## 2023-02-28 NOTE — OR NURSING
Patient allergies and NPO status verified, home medication reconciliation completed and belongings secured. Patient verbalizes understanding of pain scale, expected course of stay and plan of care. Surgical site verified with patient. IV access established. Sequentials placed on legs.

## 2023-02-28 NOTE — ANESTHESIA TIME REPORT
Anesthesia Start and Stop Event Times     Date Time Event    2/28/2023 1245 Ready for Procedure     1351 Anesthesia Start     1453 Anesthesia Stop        Responsible Staff  02/28/23    Name Role Begin End    Urbano Ayala M.D. Anesth 1355 [id number]        Overtime Reason:  no overtime (within assigned shift)    Comments:

## 2023-02-28 NOTE — ANESTHESIA PROCEDURE NOTES
Peripheral Block    Date/Time: 2/28/2023 12:45 PM  Performed by: Urbano Ayala M.D.  Authorized by: Urbano Ayala M.D.     Patient Location:  Pre-op  Start Time:  2/28/2023 12:45 PM  End Time:  2/28/2023 12:47 PM  Reason for Block: at surgeon's request and post-op pain management ONLY    patient identified, IV checked, site marked, risks and benefits discussed, surgical consent, monitors and equipment checked, pre-op evaluation and timeout performed    Patient Position:  Supine  Prep: ChloraPrep    Monitoring:  Heart rate, continuous pulse ox and cardiac monitor  Block Region:  Lower Extremity  Lower Extremity - Block Type:  Selective FEMORAL nerve block at the Adductor Canal    Laterality:  Left  Procedures: ultrasound guided  Image captured, interpreted and electronically stored.  Local Infiltration:  Lidocaine  Strength:  1 %  Dose:  3 ml  Block Type:  Single-shot  Needle Length:  100mm  Needle Gauge:  21 G  Needle Localization:  Ultrasound guidance  Injection Assessment:  Negative aspiration for heme, no paresthesia on injection, incremental injection and local visualized surrounding nerve on ultrasound   US Guided Selective Femoral Nerve Block at Adductor Canal:   US probe placed at mid-thigh level on externally rotated leg and femur identified.  Probe directed medially until Sartorius Muscle (SM), Femoral Artery (FA) and Saphenous Nerve (SN) identified in Adductor Canal (AC).  Needle inserted anterolateral to probe in an in plane approach into a subsartorial perivascular perineural position.  After negative aspiration LA injected with ease and visualized spreading within the AC.

## 2023-02-28 NOTE — DISCHARGE PLANNING
Case Management Discharge Planning    Admission Date: 2/22/2023  GMLOS: 4.8  ALOS: 5    6-Clicks ADL Score: 22  6-Clicks Mobility Score: 15    Anticipated Discharge Dispo: Discharge Disposition: D/T to home under HHA care in anticipation of covered skilled care (06)    Action(s) Taken: OTHER, DALTON RN met with pt at bedside to discuss home infusions. Per pt he is ok with the out of pocket cost with [facility] and would like to move forward with setup.     Pt will need HH order and PICC line placed prior to discharge.     Escalations Completed: None    Medically Clear: No    Next Steps: DALTON RN to follow up if wound vac placement is needed.    Barriers to Discharge: Medical clearance, Outpatient Infusion required, and Outpatient referrals pending    Is the patient up for discharge tomorrow: No

## 2023-02-28 NOTE — OR SURGEON
Immediate Post OP Note    PreOp Diagnosis: infected left TKA      PostOp Diagnosis: same      Procedure(s):  Polyethylene exchange left TKA  Irrigation and debridement left infected TKA    Surgeon(s):  Brodie Del Valle M.D.    Anesthesiologist/Type of Anesthesia:  Anesthesiologist: Urbano Ayala M.D./General    Surgical Staff:  Circulator: Marilyn Medina R.N.  Limb Danielson: Aidan Barrios  Scrub Person: Nate Juarez    Specimens removed if any:  * No specimens in log *    Estimated Blood Loss: 200cc    Findings: intact prosthesis    Complications: none        2/28/2023 2:48 PM Brodie Del Valle M.D.

## 2023-02-28 NOTE — DISCHARGE PLANNING
HTH/SCP TCN chart review completed. Collaborated with MASOOD Nicole. Discussed current discharge considerations, noted to home with HHC and IV infusion. Appreciate CM following regarding IV infusion needs at this time.     No new TCN needs identified this day, noted patient scheduled for surgery today (revision of total knee arthroplasty).     TCN will continue to follow and collaborate with discharge planning team as additional post acute needs arise. Thank you.    Completed  PT with recommendations for HH and FWW on 2/26/2023  Choice obtained: HH 2/24. IV infusion 2/27/2023. Appreciate CM following regarding HH and IV infusion orders  [facility] referral sent 2/23/2023

## 2023-02-28 NOTE — PROGRESS NOTES
Received report from dayshift RN. Pt resting in bed. Pt A&O x4, on RA. Pt denies pain at this time. Pt updated with POC which includes IV abx, NPO at midnight, and neuro checks Q4. Call light within reach, fall precautions in place, all needs met at this time.

## 2023-02-28 NOTE — DISCHARGE PLANNING
Agency/Facility Name: [facility]  Spoke To: Jordan  Outcome: Per RN CM, the pt is ok with the cost associated with [facility]. Per Jordan, he had questions regarding whether the pt has a PICC line and when he's getting the PICC line.  RN CM notified

## 2023-02-28 NOTE — OR NURSING
1522- Assumed care. Report received from Zhanna JIMENEZ. Respirations spontaneous and non-labored. Icepack applied over c/d/i Left knee surgical dressings with Prevena device.  Pillow placed under operative lower extremity ankle only, knee controls of bed locked flat.     1530- Medicated for pain 8/10. Discussed with Seman that patient will take oral pain medication on the floor. Pt agrees with this plan.     1545-Meets criteria to transfer to floor.     1600- Pt given Juice. Pain controlled and tolerable.  No change in surgical site assessment.     1602- Transferred to the floor.

## 2023-02-28 NOTE — OR NURSING
1449:  To PACU from OR via bed,  sleeping, respirations spontaneous and non-labored.  Ice pack placed on CD&I dressing.  Pillow placed under left heel. Prevena pump blinking green.    1507:  Med for pain 7/10.  Pt states he is allergic to oxycodone.  Notified MD and awaiting orders.    1512:  Pt states his pain is tolerable at this time.    1522:  Report given to Alicia JIMENEZ.

## 2023-02-28 NOTE — ANESTHESIA PROCEDURE NOTES
Airway    Date/Time: 2/28/2023 1:54 PM  Performed by: Urbano Ayala M.D.  Authorized by: Urbano Ayala M.D.     Location:  OR  Urgency:  Elective  Indications for Airway Management:  Anesthesia      Spontaneous Ventilation: absent    Sedation Level:  Deep  Preoxygenated: Yes    Final Airway Type:  Supraglottic airway  Final Supraglottic Airway:  Standard LMA    SGA Size:  4  Number of Attempts at Approach:  1   Atraumatic insertion

## 2023-02-28 NOTE — CARE PLAN
Problem: Fall Risk  Goal: Patient will remain free from falls  Outcome: Progressing     Problem: Pain - Standard  Goal: Alleviation of pain or a reduction in pain to the patient’s comfort goal  Outcome: Progressing     Problem: Psychosocial  Goal: Patient's ability to verbalize feelings about condition will improve  Outcome: Progressing         The patient is Stable - Low risk of patient condition declining or worsening    Shift Goals  Clinical Goals: pt maintains strict npo, free from fall and pain will be less than 3/10  Patient Goals: rest comfortably  Family Goals: n/a    Progress made toward(s) clinical / shift goals: Pain is 2/10 per patient and its tolerable. No fall, no dizziness, no tingling and numbness.Pt able to rest comfortably.    Patient is not progressing towards the following goals:n/a

## 2023-03-01 ENCOUNTER — APPOINTMENT (OUTPATIENT)
Dept: RADIOLOGY | Facility: MEDICAL CENTER | Age: 73
DRG: 485 | End: 2023-03-01
Attending: INTERNAL MEDICINE
Payer: MEDICARE

## 2023-03-01 LAB
ANION GAP SERPL CALC-SCNC: 8 MMOL/L (ref 7–16)
BUN SERPL-MCNC: 18 MG/DL (ref 8–22)
CALCIUM SERPL-MCNC: 8.2 MG/DL (ref 8.4–10.2)
CHLORIDE SERPL-SCNC: 104 MMOL/L (ref 96–112)
CO2 SERPL-SCNC: 23 MMOL/L (ref 20–33)
CREAT SERPL-MCNC: 0.94 MG/DL (ref 0.5–1.4)
ERYTHROCYTE [DISTWIDTH] IN BLOOD BY AUTOMATED COUNT: 48.8 FL (ref 35.9–50)
GFR SERPLBLD CREATININE-BSD FMLA CKD-EPI: 85 ML/MIN/1.73 M 2
GLUCOSE SERPL-MCNC: 180 MG/DL (ref 65–99)
HCT VFR BLD AUTO: 33.6 % (ref 42–52)
HGB BLD-MCNC: 10.7 G/DL (ref 14–18)
MAGNESIUM SERPL-MCNC: 2.4 MG/DL (ref 1.5–2.5)
MCH RBC QN AUTO: 31.3 PG (ref 27–33)
MCHC RBC AUTO-ENTMCNC: 31.8 G/DL (ref 33.7–35.3)
MCV RBC AUTO: 98.2 FL (ref 81.4–97.8)
PLATELET # BLD AUTO: 442 K/UL (ref 164–446)
PMV BLD AUTO: 10.4 FL (ref 9–12.9)
POTASSIUM SERPL-SCNC: 4.9 MMOL/L (ref 3.6–5.5)
RBC # BLD AUTO: 3.42 M/UL (ref 4.7–6.1)
SODIUM SERPL-SCNC: 135 MMOL/L (ref 135–145)
WBC # BLD AUTO: 12.7 K/UL (ref 4.8–10.8)

## 2023-03-01 PROCEDURE — 700111 HCHG RX REV CODE 636 W/ 250 OVERRIDE (IP): Performed by: INTERNAL MEDICINE

## 2023-03-01 PROCEDURE — 770001 HCHG ROOM/CARE - MED/SURG/GYN PRIV*

## 2023-03-01 PROCEDURE — 97116 GAIT TRAINING THERAPY: CPT

## 2023-03-01 PROCEDURE — C1751 CATH, INF, PER/CENT/MIDLINE: HCPCS

## 2023-03-01 PROCEDURE — 83735 ASSAY OF MAGNESIUM: CPT

## 2023-03-01 PROCEDURE — 700101 HCHG RX REV CODE 250: Performed by: INTERNAL MEDICINE

## 2023-03-01 PROCEDURE — 36415 COLL VENOUS BLD VENIPUNCTURE: CPT

## 2023-03-01 PROCEDURE — 700102 HCHG RX REV CODE 250 W/ 637 OVERRIDE(OP): Performed by: NURSE PRACTITIONER

## 2023-03-01 PROCEDURE — 700102 HCHG RX REV CODE 250 W/ 637 OVERRIDE(OP): Performed by: INTERNAL MEDICINE

## 2023-03-01 PROCEDURE — 94760 N-INVAS EAR/PLS OXIMETRY 1: CPT

## 2023-03-01 PROCEDURE — 99232 SBSQ HOSP IP/OBS MODERATE 35: CPT | Performed by: INTERNAL MEDICINE

## 2023-03-01 PROCEDURE — A9270 NON-COVERED ITEM OR SERVICE: HCPCS | Performed by: INTERNAL MEDICINE

## 2023-03-01 PROCEDURE — 99233 SBSQ HOSP IP/OBS HIGH 50: CPT | Performed by: INTERNAL MEDICINE

## 2023-03-01 PROCEDURE — 700105 HCHG RX REV CODE 258: Performed by: INTERNAL MEDICINE

## 2023-03-01 PROCEDURE — 85027 COMPLETE CBC AUTOMATED: CPT

## 2023-03-01 PROCEDURE — 80048 BASIC METABOLIC PNL TOTAL CA: CPT

## 2023-03-01 PROCEDURE — A9270 NON-COVERED ITEM OR SERVICE: HCPCS | Performed by: NURSE PRACTITIONER

## 2023-03-01 PROCEDURE — 97110 THERAPEUTIC EXERCISES: CPT

## 2023-03-01 RX ADMIN — CLINDAMYCIN PHOSPHATE 900 MG: 900 INJECTION, SOLUTION INTRAVENOUS at 04:55

## 2023-03-01 RX ADMIN — GABAPENTIN 100 MG: 100 CAPSULE ORAL at 06:09

## 2023-03-01 RX ADMIN — GABAPENTIN 100 MG: 100 CAPSULE ORAL at 17:11

## 2023-03-01 RX ADMIN — TRAMADOL HYDROCHLORIDE 50 MG: 50 TABLET, COATED ORAL at 23:40

## 2023-03-01 RX ADMIN — DAKIN'S SOLUTION 0.125% (QUARTER STRENGTH) 473 ML: 0.12 SOLUTION at 17:09

## 2023-03-01 RX ADMIN — AMPICILLIN AND SULBACTAM 3 G: 1; 2 INJECTION, POWDER, FOR SOLUTION INTRAMUSCULAR; INTRAVENOUS at 17:11

## 2023-03-01 RX ADMIN — SIMVASTATIN 10 MG: 10 TABLET, FILM COATED ORAL at 06:09

## 2023-03-01 RX ADMIN — DAKIN'S SOLUTION 0.125% (QUARTER STRENGTH) 1 ML: 0.12 SOLUTION at 04:58

## 2023-03-01 RX ADMIN — ENOXAPARIN SODIUM 40 MG: 40 INJECTION SUBCUTANEOUS at 17:12

## 2023-03-01 RX ADMIN — AMPICILLIN AND SULBACTAM 3 G: 1; 2 INJECTION, POWDER, FOR SOLUTION INTRAMUSCULAR; INTRAVENOUS at 11:20

## 2023-03-01 RX ADMIN — AMPICILLIN AND SULBACTAM 3 G: 1; 2 INJECTION, POWDER, FOR SOLUTION INTRAMUSCULAR; INTRAVENOUS at 23:38

## 2023-03-01 RX ADMIN — AMPICILLIN AND SULBACTAM 3 G: 1; 2 INJECTION, POWDER, FOR SOLUTION INTRAMUSCULAR; INTRAVENOUS at 06:37

## 2023-03-01 RX ADMIN — GABAPENTIN 100 MG: 100 CAPSULE ORAL at 11:17

## 2023-03-01 ASSESSMENT — GAIT ASSESSMENTS
DEVIATION: DECREASED HEEL STRIKE;DECREASED TOE OFF
GAIT LEVEL OF ASSIST: STANDBY ASSIST
DISTANCE (FEET): 110
ASSISTIVE DEVICE: FRONT WHEEL WALKER

## 2023-03-01 ASSESSMENT — COGNITIVE AND FUNCTIONAL STATUS - GENERAL
STANDING UP FROM CHAIR USING ARMS: A LITTLE
WALKING IN HOSPITAL ROOM: A LITTLE
MOVING FROM LYING ON BACK TO SITTING ON SIDE OF FLAT BED: A LITTLE
MOVING TO AND FROM BED TO CHAIR: A LITTLE
MOBILITY SCORE: 18
CLIMB 3 TO 5 STEPS WITH RAILING: A LITTLE
TURNING FROM BACK TO SIDE WHILE IN FLAT BAD: A LITTLE
SUGGESTED CMS G CODE MODIFIER MOBILITY: CK

## 2023-03-01 ASSESSMENT — ENCOUNTER SYMPTOMS
SHORTNESS OF BREATH: 0
PALPITATIONS: 0
WEAKNESS: 0
VOMITING: 0
SORE THROAT: 0
HEARTBURN: 0
DEPRESSION: 0
BACK PAIN: 0
DIZZINESS: 0
MYALGIAS: 1
DIARRHEA: 0
COUGH: 0
HEADACHES: 0
BLOOD IN STOOL: 0
CHILLS: 0
NAUSEA: 0
HALLUCINATIONS: 0
FOCAL WEAKNESS: 0
ABDOMINAL PAIN: 0
FEVER: 0

## 2023-03-01 ASSESSMENT — PAIN DESCRIPTION - PAIN TYPE
TYPE: SURGICAL PAIN
TYPE: SURGICAL PAIN

## 2023-03-01 NOTE — PROGRESS NOTES
Infectious Disease Progress Note    Author: Elgin Page M.D. Date & Time of service: 3/1/2023  8:51 AM    Chief Complaint:  Follow-up for bacteremia    Interval History:   Tmax 99.4, white count continues to trend down, 11,000 today, tolerated switch to Unasyn. Left leg and foot persistent swelling but with worse erythema and increased warmth, possible early blister formation   patient remains afebrile, white count is 11.5 today, with findings evolved as below  3/1 patient remains afebrile, white count is 12.7, tolerating Unasyn and clindamycin.  Underwent surgery yesterday, feeling much better overall    Labs Reviewed and Medications Reviewed.    Review of Systems:  Review of Systems   Constitutional:  Negative for chills and fever.   Gastrointestinal:  Negative for abdominal pain, diarrhea, nausea and vomiting.   Musculoskeletal:  Positive for joint pain.   Skin:  Negative for itching and rash.   All other systems reviewed and are negative.    Hemodynamics:  Temp (24hrs), Av.7 °C (98.1 °F), Min:36.4 °C (97.5 °F), Max:36.9 °C (98.4 °F)  Temperature: 36.9 °C (98.4 °F)  Pulse  Av.1  Min: 57  Max: 119   Blood Pressure : 136/66       Physical Exam:  Physical Exam  Vitals and nursing note reviewed.   Constitutional:       General: He is not in acute distress.     Appearance: He is not ill-appearing.   HENT:      Mouth/Throat:      Pharynx: No oropharyngeal exudate.   Eyes:      General: No scleral icterus.        Right eye: No discharge.         Left eye: No discharge.      Conjunctiva/sclera: Conjunctivae normal.   Cardiovascular:      Rate and Rhythm: Normal rate.      Heart sounds: No murmur heard.  Pulmonary:      Effort: Pulmonary effort is normal. No respiratory distress.      Breath sounds: No stridor.   Abdominal:      General: Abdomen is flat. There is no distension.      Tenderness: There is no abdominal tenderness.   Musculoskeletal:         General: Swelling and tenderness present.       Comments: Left knee and leg with surgical dressing and drain in place.  Undressed part of distal foot with improved swelling and increased mobility, no erythema   Neurological:      General: No focal deficit present.      Mental Status: He is alert and oriented to person, place, and time.   Psychiatric:         Mood and Affect: Mood normal.         Behavior: Behavior normal.       Meds:    Current Facility-Administered Medications:     clindamycin (CLEOCIN) IV    ampicillin-sulbactam (UNASYN) IV    gabapentin    traMADol    acetaminophen    dakins 0.125% (1/4 strength)    enoxaparin (LOVENOX) injection    aspirin    simvastatin    HYDROmorphone    LR    Labs:  Recent Labs     02/27/23 0121 02/28/23 0312 03/01/23  0125   WBC 11.5* 10.5 12.7*   RBC 3.42* 3.70* 3.42*   HEMOGLOBIN 10.9* 11.6* 10.7*   HEMATOCRIT 32.5* 35.2* 33.6*   MCV 95.0 95.1 98.2*   MCH 31.9 31.4 31.3   RDW 46.8 46.5 48.8   PLATELETCT 367 444 442   MPV 11.0 10.6 10.4   NEUTSPOLYS 63.90 65.20  --    LYMPHOCYTES 15.80* 16.30*  --    MONOCYTES 11.20 10.20  --    EOSINOPHILS 3.10 2.20  --    BASOPHILS 0.90 0.80  --        Recent Labs     02/27/23  0121 02/28/23 0312 03/01/23  0125   SODIUM 135 134* 135   POTASSIUM 4.0 4.3 4.9   CHLORIDE 104 101 104   CO2 21 24 23   GLUCOSE 138* 129* 180*   BUN 13 13 18       Recent Labs     02/27/23 0121 02/28/23 0312 03/01/23  0125   ALBUMIN 2.1* 2.6*  --    CREATININE 0.82 0.91 0.94         Imaging:  DX-CHEST-PORTABLE (1 VIEW)    Result Date: 2/23/2023 2/23/2023 8:45 AM HISTORY/REASON FOR EXAM:  Fever. TECHNIQUE/EXAM DESCRIPTION AND NUMBER OF VIEWS: Single portable view of the chest. COMPARISON: None FINDINGS: LUNGS: The lungs are clear. HEART and MEDIASTINUM: enlarged. Pleura: There are no pleural effusion or pneumothoraces. 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     1.  No acute cardiopulmonary abnormality identified. 2.  Enlarged cardiac silhouette    DX-KNEE 2-  "LEFT    Result Date: 2/22/2023 2/22/2023 9:25 AM HISTORY/REASON FOR EXAM:  post op pain 3 weeks post op. Pt having left knee pain, swelling and seeping wound TECHNIQUE/EXAM DESCRIPTION AND NUMBER OF VIEWS:  2 views of the LEFT knee. COMPARISON: 2/1/2023 FINDINGS: Postsurgical changes from left knee arthoplasty. No periprosthetic fracture. No suspicious lucency. There are mild soft tissue swelling anteriorly. Moderate joint effusion     1. Moderate joint effusion, new since prior.    DX-KNEE 2- LEFT    Result Date: 2/1/2023 2/1/2023 8:57 AM HISTORY/REASON FOR EXAM:  Arthroplasty. TECHNIQUE/EXAM DESCRIPTION AND NUMBER OF VIEWS:  2 views of the LEFT knee. COMPARISON: None FINDINGS: Total knee arthroplasty with patellar resurfacing has been performed.  The components are cemented. No periprosthetic fracture is detected.     Total knee arthroplasty without periprosthetic fracture.       Micro:  Results       Procedure Component Value Units Date/Time    Blood Culture [[id number]] Collected: 02/22/23 2335    Order Status: Completed Specimen: Blood from Peripheral Updated: 02/28/23 0017     Significant Indicator NEG     Source BLD     Site PERIPHERAL     Culture Result No growth after 5 days of incubation.  Blood culture testing and Gram stain, if indicated, are  performed at [facility], [address] [place] [address].  Positive blood cultures are  sent to [facility], [address] [place] [address], for organism identification and  susceptibility testing.      Narrative:      From different peripheral sites, if not done within the last  24 hours (Per Hospital Policy: Only change specimen source to  \"Line\" if specified by physician order)  Left Hand    Blood Culture [[id number]] Collected: 02/22/23 2015    Order Status: Completed Specimen: Blood from Peripheral Updated: 02/27/23 2217     Significant Indicator NEG     Source BLD     Site PERIPHERAL     Culture Result No " "growth after 5 days of incubation.  Blood culture testing and Gram stain, if indicated, are  performed at [facility], [address] [place] [address].  Positive blood cultures are  sent to [facility], [address] [place] [address], for organism identification and  susceptibility testing.      Narrative:      From different peripheral sites, if not done within the last  24 hours (Per Hospital Policy: Only change specimen source to  \"Line\" if specified by physician order)  Right Hand    Anaerobic Culture [[id number]] Collected: 02/22/23 1428    Order Status: Completed Specimen: Tissue Updated: 02/26/23 1335     Significant Indicator NEG     Source TISS     Site Left Knee Deep     Culture Result No Anaerobes isolated.    Narrative:      CALL  Otto  U tel. [phone],  CALLED  [facility] tel. [phone] 02/25/2023, 08:10, RB PERF. RESULTS CALLED TO: RN  [id number]  3 knee tissue  left knee deep  Surgery Specimen    CULTURE TISSUE W/ GRM STAIN [[id number]]  (Abnormal) Collected: 02/22/23 1428    Order Status: Completed Specimen: Tissue Updated: 02/26/23 1335     Significant Indicator POS     Source TISS     Site Left Knee Deep     Culture Result -     Gram Stain Result Rare WBCs.  No organisms seen.       Culture Result Streptococcus pyogenes (Group A)  Rare growth      Narrative:      CALL  Otto  U tel. [phone],  CALLED  [facility] tel. [phone] 02/25/2023, 08:10, RB PERF. RESULTS CALLED TO: RN  [id number]  3 knee tissue  left knee deep  Surgery Specimen    CULTURE WOUND W/ GRAM STAIN [[id number]]  (Abnormal) Collected: 02/22/23 1426    Order Status: Completed Specimen: Body Fluid Updated: 02/26/23 1334     Significant Indicator POS     Source BF     Site left knee deep     Culture Result -     Gram Stain Result Few WBCs.  No organisms seen.       Culture Result Beta Hemolytic Streptococcus group A  Light growth      Narrative:      CALL  Otto  [facility] tel. [phone],  CALLED  [facility] tel. " [phone] 02/24/2023, 07:01, RB PERF. RESULTS CALLED TO: RN  [id number]  2 knee body fluid  left knee deep  Surgery - swabs received    Anaerobic Culture [[id number]] Collected: 02/22/23 1426    Order Status: Completed Specimen: Body Fluid Updated: 02/26/23 1334     Significant Indicator NEG     Source BF     Site left knee deep     Culture Result No Anaerobes isolated.    Narrative:      CALL  Otto  [facility] tel. [phone],  CALLED  [facility] tel. [phone] 02/24/2023, 07:01, RB PERF. RESULTS CALLED TO: RN  [id number]  2 knee body fluid  left knee deep  Surgery - swabs received    Anaerobic Culture [[id number]] Collected: 02/22/23 1417    Order Status: Completed Specimen: Body Fluid Updated: 02/26/23 1334     Significant Indicator NEG     Source BF     Site left knee deep     Culture Result No Anaerobes isolated.    Narrative:      CALL  Otto  [facility] tel. [phone],  CALLED  [facility] tel. [phone] 02/24/2023, 07:00, RB PERF. RESULTS CALLED TO: RN  [id number]  1 knee body fluid  left knee deep  Surgery - swabs received    CULTURE WOUND W/ GRAM STAIN [[id number]]  (Abnormal) Collected: 02/22/23 1417    Order Status: Completed Specimen: Body Fluid Updated: 02/26/23 1334     Significant Indicator POS     Source BF     Site left knee deep     Culture Result -     Gram Stain Result No organisms seen.     Culture Result Beta Hemolytic Streptococcus group A  Light growth      Narrative:      CALL  Otto  [facility] tel. [phone],  CALLED  [facility] tel. [phone] 02/24/2023, 07:00, RB PERF. RESULTS CALLED TO: RN  [id number]  1 knee body fluid  left knee deep  Surgery - swabs received    Blood Culture [[id number]]  (Abnormal) Collected: 02/22/23 0948    Order Status: Completed Specimen: Blood from Peripheral Updated: 02/25/23 1040     Significant Indicator POS     Source BLD     Site PERIPHERAL     Culture Result Growth detected by Bactec instrument. 02/22/2023  22:05      Beta Hemolytic Streptococcus group A  See previous culture for sensitivity report.      Narrative:   "    CALL  Otto  [facility] tel. [phone],  CALLED  U tel. [phone] 02/24/2023, 08:29, RB PERF. RESULTS CALLED TO: RN  [id number]  2 of 2 blood culture x2  Sites order. Per Hospital Policy:  Only change Specimen Src: to \"Line\" if specified by physician  order.  Left AC    Blood Culture [[id number]]  (Abnormal)  (Susceptibility) Collected: 02/22/23 0925    Order Status: Completed Specimen: Blood from Peripheral Updated: 02/25/23 1039     Significant Indicator POS     Source BLD     Site PERIPHERAL     Culture Result Growth detected by Bactec instrument. 02/22/2023  22:03      Beta Hemolytic Streptococcus group A  This isolate does NOT demonstrate inducible Clindamycin  resistance in vitro.      Narrative:      CALL  Otto  [facility] tel. [phone],  CALLED  [facility] tel. [phone] 02/24/2023, 08:24, RB PERF. RESULTS CALLED TO: RN  [id number]  1 of 2 for Blood Culture x 2 sites order. Per Hospital  Policy: Only change Specimen Src: to \"Line\" if specified by  physician order.  Right AC    Susceptibility       Beta hemolytic streptococcus group a (1)       Antibiotic Interpretation Microscan   Method Status    Penicillin Sensitive 0.032 mcg/mL E-TEST Final    Cefotaxime Sensitive 0.032 mcg/mL E-TEST Final    Clindamycin Sensitive - mcg/mL E-TEST Final                       Culture Wound With Gram Stain [[id number]]  (Abnormal) Collected: 02/22/23 0930    Order Status: Completed Specimen: Wound from Left Leg Updated: 02/24/23 1046     Significant Indicator POS     Source WND     Site LEFT LEG     Culture Result -     Gram Stain Result Rare Gram positive cocci.  Rare WBCs.       Culture Result Beta Hemolytic Streptococcus group A  Heavy growth      Narrative:      CALL  Otto  [facility] tel. [phone],  CALLED  [facility] tel. [phone] 02/23/2023, 06:40, RB PERF. RESULTS CALLED TO: RN  [id number]    BLOOD CULTURE [[id number]] Collected: 02/24/23 0400    Order Status: Canceled Specimen: Other from Peripheral     BLOOD CULTURE [[id number]] Collected: 02/24/23 0106 "    Order Status: Canceled Specimen: Other from Peripheral     GRAM STAIN [[id number]] Collected: 02/22/23 1417    Order Status: Completed Specimen: Body Fluid Updated: 02/23/23 1640     Significant Indicator .     Source BF     Site left knee deep     Gram Stain Result No organisms seen.    Narrative:      1 knee body fluid  left knee deep  Surgery - swabs received    GRAM STAIN [[id number]] Collected: 02/22/23 1426    Order Status: Completed Specimen: Body Fluid Updated: 02/23/23 1640     Significant Indicator .     Source BF     Site left knee deep     Gram Stain Result Few WBCs.  No organisms seen.      Narrative:      2 knee body fluid  left knee deep  Surgery - swabs received    GRAM STAIN [[id number]] Collected: 02/22/23 1428    Order Status: Completed Specimen: Tissue Updated: 02/23/23 1629     Significant Indicator .     Source TISS     Site Left Knee Deep     Gram Stain Result Rare WBCs.  No organisms seen.      Narrative:      3 knee tissue  left knee deep  Surgery Specimen    GRAM STAIN [[id number]] Collected: 02/22/23 0930    Order Status: Completed Specimen: Wound Updated: 02/23/23 0152     Significant Indicator .     Source WND     Site LEFT LEG     Gram Stain Result Rare Gram positive cocci.  Rare WBCs.              Assessment:  Raymundo Jackson is a 72 y.o. male patient admitted with group A strep bacteremia from infected left prosthetic knee joint.  Possible source is right middle finger wound     Pertinent Diagnoses:  Group A Strep bacteremia  Left knee prosthetic joint infection  Left leg and foot cellulitis    Plan:  -Continue IV Unasyn for now  -Repeat blood cultures x2 from 2/22 no growth till date  -Underwent repeat I&D with poly exchange on 2/28  -Left leg cellulitis improved.  Discontinue clindamycin.  CT scan with no underlying abscess    Plan was discussed with the primary team, Dr. Elias     Disposition: Patient prefers eventual discharge to home with home IV antibiotics.  Paper orders for  IV ceftriaxone 2 g every 24 hours through 4/10/2023 faxed to CM on 2/26  At least weekly CBC with differential, CMP while on IV antibiotics  Following above IV antibiotic course, anticipate oral antibiotics for approximately 3-6 months or longer  Need for PICC line: Yes, ordered    ID will sign off    ID clinic follow-up in 2 to 3 weeks.  Okay to schedule with NP Shell

## 2023-03-01 NOTE — DISCHARGE PLANNING
Received Choice form at 1521  Agency/Facility Name: [facility]  Referral sent per Choice form @ 1522     Received Choice form at 1529  Agency/Facility Name: [facility]  Referral sent per Choice form @ 3215

## 2023-03-01 NOTE — PROGRESS NOTES
Pain controlled ambulating well  AVSS  Dressing c/d/i  NVI    S/p repeat I and d and poly exchange pOD 1  Ok for d/c home from ortho standpoint when home abx set up  F/u my office 10-14 days

## 2023-03-01 NOTE — OP REPORT
DATE OF SERVICE:  02/28/2023     PREOPERATIVE DIAGNOSIS:  Infected left total knee arthroplasty.     POSTOPERATIVE DIAGNOSIS:  Infected left total knee arthroplasty.     PROCEDURES:  1.  Irrigation and debridement, infected left total knee arthroplasty ____.  2.  Exchange of polyethylene component, left total knee arthroplasty.     SURGEON:  Brodie Del Valle MD     ASSISTANT:  ALICE Arredondo     ANESTHESIA:  General endotracheal anesthesia.     ANESTHESIOLOGIST:  Urbano Ayala MD     ESTIMATED BLOOD LOSS:  200 mL.     COMPLICATIONS:  None.     INDICATIONS FOR PROCEDURE:  This is a 72-year-old male who is approximately   months status post left total knee arthroplasty.  He presented last week with   significant infection.  He underwent initial irrigation and debridement.  Now,   he presents for repeat irrigation and debridement as well as exchange of his   polyethylene liner.  For further details of H and P, please see chart.     Risks, benefits and alternatives of procedure were discussed with the patient   and wife and include but are not limited to bleeding, infection, neurovascular   injury, need for further surgery, PE, DVT, blood transfusion with its   associated risks, anesthesia with its associated risks, and death.  All   questions were answered.  No warranties or guarantees were given or implied.    Consent is signed and is on chart.     DESCRIPTION OF PROCEDURE:  The patient was taken to the operating room and   placed supine on the operating table.  The staples from his previous I and D   were removed.  Left lower extremity was prepped and draped in normal sterile   fashion.  Skin was incised and the remaining sutures were removed.  Medial   parapatellar arthrotomy was also then incised.  The sutures were removed.    There was no significant fluid collection noted.  Tissues appeared   significantly healthy than his previous I and D.  There were a few small areas   of necrotic-appearing  tissue, which were carefully debrided back.  Complete   debridement was then carried out in the medial and lateral gutter superior to   the prosthesis.  At this point, the polyethylene component was removed.  The   posterior knee was then carefully cleaned out as well.  The knee was then   thoroughly lavaged with normal saline.  It was then lavaged with Irrisept,   which was allowed to set for approximately 3 minutes.  This was then removed.    It was lavaged with Betadine, which was also allowed to sit for approximately   3 minutes.  It was then relavaged with saline.  At this point, there was good   bleeding tissue.  There was no significant necrotic areas noted.  A new   polyethylene liner was impacted and seated quite well.  This showed excellent   stability with good range of motion of the knee.  At this point, the   parapatellar arthrotomy was closed using #2 Quill.  Subcutaneous tissue closed   using 2-0 PDS.  Skin closed using staples.  The patient was placed in a   Prevena incisional VAC dressing.  He was awakened from anesthesia and returned   to recovery cart in the recovery room in stable condition.  There were no   anam or intraoperative complications noted.        ______________________________  MD GABBIE Yu/PATSY/MADDIE    DD:  02/28/2023 14:52  DT:  02/28/2023 17:56    Job#:  [id number]

## 2023-03-01 NOTE — THERAPY
Physical Therapy   Daily Treatment     Patient Name: Raymundo Jackson  Age:  72 y.o., Sex:  male  Medical Record #: [id number]  Today's Date: 3/1/2023     Precautions  Precautions: Weight Bearing As Tolerated Left Lower Extremity    Assessment    Pts pain has improved resulting in improved activity tolerance and knee ROM.  Pt is well versed with TKA HEP, stair training completed. Pt plans to DC home tomorrow. Pt will be available to answer any questions or work on ROM/ mobility as needed.     Plan    Treatment Plan Status: Continue Current Treatment Plan  Type of Treatment: Bed Mobility, Equipment, Manual Therapy, Gait Training, Neuro Re-Education / Balance, Self Care / Home Evaluation, Stair Training, Therapeutic Activities, Therapeutic Exercise  Treatment Frequency: Daily  Treatment Duration: Until Therapy Goals Met    DC Equipment Recommendations: Raised Toilet Seat with Arms  Discharge Recommendations: Recommend outpatient physical therapy services to address higher level deficits       03/01/23 1040   Cognition    Level of Consciousness Alert   Comments Pt agreeable for PT, states knee is less painful today   Passive ROM Lower Body   Lt Knee Flexion Degrees 60   Comments muscle guarding limiting flexion   Supine Lower Body Exercise   Comments QSx10, SAQ x10 (assisted), HS with sheet to assist x10   Sitting Lower Body Exercises   Comments seated knee flex x10 reps   Neuro-Muscular Treatments   Neuro-Muscular Treatments Reciprocal Inhibition   Comments L quads   Balance   Sitting Balance (Static) Good   Sitting Balance (Dynamic) Fair +   Standing Balance (Static) Fair   Standing Balance (Dynamic) Fair   Weight Shift Sitting Fair   Weight Shift Standing Fair   Skilled Intervention Postural Facilitation;Sequencing   Comments stdg with FWW   Bed Mobility    Supine to Sit Standby Assist  (utilized sheet to assist L LE off of bed)   Gait Analysis   Gait Level Of Assist Standby Assist   Assistive Device Front Wheel  Walker   Distance (Feet) 110   # of Times Distance was Traveled 1   Deviation Decreased Heel Strike;Decreased Toe Off   # of Stairs Climbed 1   Level of Assist with Stairs Contact Guard Assist   Weight Bearing Status WBAT L LE   Skilled Intervention Postural Facilitation;Sequencing;Tactile Cuing;Verbal Cuing   Functional Mobility   Sit to Stand Standby Assist   Bed, Chair, Wheelchair Transfer Standby Assist   Transfer Method Stand Step  (with FWW)   Activity Tolerance   Sitting in Chair 1 hr   Standing 10 min   Short Term Goals    Short Term Goal # 1 pt will go supine<>sit w/hob flat and rails down w/spv in 6tx   Goal Outcome # 1 Progressing as expected   Short Term Goal # 2 pt will go sit<>stand w/fww w/spv in 6tx   Goal Outcome # 2 Progressing as expected   Short Term Goal # 3 pt will transfer bed<>chair w/fww w/spv in 6tx   Goal Outcome # 3 Progressing as expected   Short Term Goal # 4 pt will ambulate for 150ft w/fww w/spv in 6tx   Goal Outcome # 4 Progressing as expected   Short Term Goal # 5 pt will go up/down 2 steps w/spv in 6tx   Goal Outcome # 5 Progressing as expected

## 2023-03-01 NOTE — PROGRESS NOTES
"Hospital Medicine Daily Progress Note    Date of Service  3/1/2023    Chief Complaint  Raymundo Jackson is a 72 y.o. male admitted 2/22/2023 with left knee pain and fever    Hospital Course  History of recent left total knee arthroplasty who has been recovering well until 3 days prior to presentation when he noted profuse drainage from his surgical wound followed by sudden onset severe pain with weightbearing.  He presented to the ER and was found to have sepsis secondary to septic arthritis and his prosthetic joint.  Orthopedic surgery was consulted and they recommended emergent I&D    Interval Problem Update  \"2/23: Tolerating antibiotics, cultures growing strep, for possible discontinue vancomycin.  Lactate still high, 1 L fluid bolus repeated.  Blood pressure .  Blood Cx + for strep in 2 of 2 cx    2/24: ID rec continue ctx, Lactate improved to 2.1.  Pain improving.  Repeat blood cx drawn to eval if negative.  Planning repeat washout next week.    2/25: WBC improving change pain meds to tramadol.  Add gabapentin.  Discussed with ID, change antibiotics to Unasyn for less C. difficile risk.  Repeat blood cultures are negative so far.      2/26: Fluids discontinued.  Tolerating Unasyn.  Shooting pain in left lower extremity improving.  D/W wife at bedside    2/27: WBC stable, clindamycin added yesterday due to lower leg swelling and concern for possible neck fashion.  CT of the left lower extremity is pending to ensure no abscess..  Both lower extremities and perineum are swollen, will need Lasix if creatinine is stable tomorrow after CTA.  N.p.o. at midnight for repeat left knee washout tomorrow\"    Patient doing well, no complaints.  Discussed with infectious disease, patient okay for discharge once antibiotics arranged, okay to place PICC.  Discussed with orthopedic surgery, okay for discharge once outpatient antibiotics and home health arranged.    I have discussed this patient's plan of care and " discharge plan at IDT rounds today with Case Management, Nursing, Nursing leadership, and other members of the IDT team.    Consultants/Specialty  orthopedics    Code Status  Full Code    Disposition  Patient is not medically cleared for discharge.   Anticipate discharge to to home with organized home healthcare and close outpatient follow-up.  I have placed the appropriate orders for post-discharge needs.    Review of Systems  Review of Systems   Constitutional:  Negative for chills, fever (Resolved) and malaise/fatigue.   HENT:  Negative for sore throat.    Respiratory:  Negative for cough and shortness of breath.    Cardiovascular:  Positive for leg swelling. Negative for chest pain and palpitations.   Gastrointestinal:  Negative for abdominal pain, blood in stool, diarrhea, heartburn, nausea and vomiting.   Genitourinary:  Negative for dysuria and frequency.   Musculoskeletal:  Positive for joint pain and myalgias. Negative for back pain.   Skin:         Finger wound, wrapped   Neurological:  Negative for dizziness, focal weakness, weakness and headaches.   Psychiatric/Behavioral:  Negative for depression and hallucinations.    All other systems reviewed and are negative.     Physical Exam  Temp:  [36.4 °C (97.5 °F)-36.9 °C (98.4 °F)] 36.7 °C (98 °F)  Pulse:  [59-91] 64  Resp:  [12-18] 18  BP: (112-172)/(56-90) 117/56  SpO2:  [90 %-100 %] 99 %    Physical Exam  Vitals and nursing note reviewed.   Constitutional:       Appearance: Normal appearance.   Cardiovascular:      Rate and Rhythm: Normal rate and regular rhythm.      Heart sounds: No murmur heard.  Pulmonary:      Effort: Pulmonary effort is normal. No respiratory distress.      Breath sounds: Normal breath sounds. No stridor.   Abdominal:      General: Abdomen is flat. Bowel sounds are normal. There is no distension.      Palpations: Abdomen is soft.      Tenderness: There is no abdominal tenderness.   Musculoskeletal:         General: No swelling,  tenderness or deformity.      Right lower leg: No edema.      Left lower leg: No edema.      Comments: Left lower leg has medial erythema and swelling, wound VAC in place over left knee   Skin:     General: Skin is warm and dry.      Coloration: Skin is not pale.      Findings: No erythema.      Comments: Finger wound wrapped   Neurological:      General: No focal deficit present.      Mental Status: He is alert and oriented to person, place, and time. Mental status is at baseline.       Fluids    Intake/Output Summary (Last 24 hours) at 3/1/2023 1155  Last data filed at 3/1/2023 1000  Gross per 24 hour   Intake 1486.33 ml   Output 2500 ml   Net -1013.67 ml       Laboratory  Recent Labs     02/27/23  0121 02/28/23  0312 03/01/23  0125   WBC 11.5* 10.5 12.7*   RBC 3.42* 3.70* 3.42*   HEMOGLOBIN 10.9* 11.6* 10.7*   HEMATOCRIT 32.5* 35.2* 33.6*   MCV 95.0 95.1 98.2*   MCH 31.9 31.4 31.3   MCHC 33.5* 33.0* 31.8*   RDW 46.8 46.5 48.8   PLATELETCT 367 444 442   MPV 11.0 10.6 10.4     Recent Labs     02/27/23  0121 02/28/23 0312 03/01/23  0125   SODIUM 135 134* 135   POTASSIUM 4.0 4.3 4.9   CHLORIDE 104 101 104   CO2 21 24 23   GLUCOSE 138* 129* 180*   BUN 13 13 18   CREATININE 0.82 0.91 0.94   CALCIUM 7.7* 8.4 8.2*                     Imaging  CT-EXTREMITY, LOWER WITH LEFT   Final Result      1.  Status post left knee arthroplasty. Components appear to be in place and intact.      2.  Diffuse edema within left lower extremity could indicate cellulitis.      3.  No abscess identified.      DX-CHEST-PORTABLE (1 VIEW)   Final Result      1.  No acute cardiopulmonary abnormality identified.      2.  Enlarged cardiac silhouette      DX-KNEE 2- LEFT   Final Result         1. Moderate joint effusion, new since prior.      IR-PICC LINE PLACEMENT W/ GUIDANCE > AGE 5    (Results Pending)          Assessment/Plan  * Infection of prosthetic knee joint, initial encounter (HCC)- (present on admission)  Assessment & Plan  Presented  acutely with sepsis secondary to left total knee infection w strep bacteramia  Surgical washout 2/22  Repeat washout planned for tomorrow 2/28  Changed to Unasyn (lower C Dif) + Clindamycin for concern for nec fasc (f/u CT)  Discussed with ID, follow recommendations  L knee and blood cx 2/22 + Group A Strep  Will need 6 weeks of IV antibiotics from date of his last surgery, 2/28  LE swelling below knee, CT today to rule out abscess / fluid collection which would need to be washed out  PICC line placed today       Anasarca  Assessment & Plan  Due to fluids needed for sepsis, he now has been spacing the lower extremities and perineal region  Follow labs  Resolved    Streptococcal bacteremia  Assessment & Plan  Blood cultures from 2/22 returned positive in 2 of 2 cultures with strep continue IV ceftriaxone  Discussed with ID    Finger wound, simple, open- (present on admission)  Assessment & Plan  Dressing changes    Septic arthritis (HCC)- (present on admission)  Assessment & Plan  Secondary to recent total knee which was done in early February by Dr. James  S/P washout 2/22 and 2/28  Continue antibiotics per ID  Discussed with ID  Added gabapentin and tramadol for pain    Sepsis (HCC)  Assessment & Plan  He had profound lactic acidosis due to septic left knee  Status post washout for source control on 2/22, repeat washout planned for tmrw  NPO at [place]  Continue empiric antibiotics, Unasyn and Clinda  Blood and L knee cultures so far with group A strep  He has fluid overload and lower extremity/perineal edema from fluids given for sepsis  Resolved    Dyslipidemia- (present on admission)  Assessment & Plan  Continue home ASA and Zocor    Prediabetes  Assessment & Plan  Prediabetic A1c 6.3  Metformin on hold         VTE prophylaxis: SCDs/TEDs and enoxaparin ppx    I have performed a physical exam and reviewed and updated ROS and Plan today (3/1/2023). In review of yesterday's note (2/28/2023), there are no changes  except as documented above.

## 2023-03-01 NOTE — CARE PLAN
The patient is Stable - Low risk of patient condition declining or worsening    Shift Goals  Clinical Goals: Pt will ambulate 50 ft with assistance during shift; pain mgt and IV abx.  Patient Goals: Sleep and pain mgt.  Family Goals: ambulation    Progress made toward(s) clinical / shift goals:    Pt ambulated 65 ft x 2 with 2 standby assist for first attempt ambulation, left LE WBAT, tolerated, no pain. Pt continued on IV abx.     Patient is not progressing towards the following goals: n/a

## 2023-03-01 NOTE — FACE TO FACE
Face to Face Supporting Documentation - Home Health    The encounter with this patient was in whole or in part the primary reason for home health admission.    Date of encounter:   Patient:                    MRN:                       YOB: 2024  Raymundo Jackson  [id number]  1950     Home health to see patient for:  Skilled Nursing care for assessment, interventions & education, Wound Care, Home health aide, Physical Therapy evaluation and treatment, and Occupational therapy evaluation and treatment    Skilled need for:  Surgical Aftercare knee arthorplasty    Skilled nursing interventions to include:  Venous access care, Wound Care, and Line/Drain/Airway education and care    Homebound status evidenced by:  Need the aid of supportive devices such as crutches, canes, wheelchairs or walkers or Needs the assistance of another person in order to leave the home. Leaving home requires a considerable and taxing effort. There is a normal inability to leave the home.    Community Physician to provide follow up care: Edenilson Simpson M.D.     Optional Interventions? No      I certify the face to face encounter for this home health care referral meets the CMS requirements and the encounter/clinical assessment with the patient was, in whole, or in part, for the medical condition(s) listed above, which is the primary reason for home health care. Based on my clinical findings: the service(s) are medically necessary, support the need for home health care, and the homebound criteria are met.  I certify that this patient has had a face to face encounter by myself.  Jose Elias M.D. - NPI: [id number]

## 2023-03-01 NOTE — PROCEDURES
Vascular Access Team     Date of Insertion: 3/1/23  Arm Circumference: 30 cm  Internal length: 44 cm  External Length: 0  Vein Occupancy %: 45   Reason for PICC: 6 weeks antibiotics  Labs: WBC 12.7, , BUN 18, Cr 0.94, GFR 85, INR N/A     Consents confirmed, vessel patency confirmed with ultrasound. Risks and benefits of procedure explained to patient and education regarding central line associated bloodstream infections provided. Questions answered.      PICC placed in LUE per licensed provider order with ultrasound guidance.  4 Fr, single lumen PICC placed in basilic vein after 1 attempt(s). 2 mL of 1% lidocaine injected intradermally at the insertion site. A 21 gauge microintroducer needle was visualized entering the vein and modified Seldinger technique was used to obtain access to the vein. 44 cm catheter inserted and brisk blood return was observed from each lumen upon aspiration. Line secured at the 0 cm marker. TCS stylet removed and observed to be fully intact. Each lumen flushed using pulsatile method without resistance with 10 mL 0.9% normal saline. PICC line secured with Biopatch and Tegaderm.     PICC tip placement location is confirmed by nurse to be in the Superior Vena Cava (SVC) utilizing 3CG technology. PICC line is appropriate for use at this time. Patient tolerated procedure well, without complications.  Patient condition relayed to primary RN or ordering physician via this post procedure note in the EMR.      Ultrasound images uploaded to PACS and viewable in the EMR - yes  Ultrasound imaged printed and placed in paper chart - no     [employer] Power PICC ref # [id number], Lot # [id number], Expiration Date 11/30/2023

## 2023-03-01 NOTE — PROGRESS NOTES
Received bedside report.  Assumed pt care.   Assessment and chart check complete.  Pt is AA0X4, no signs of distress, denies nausea/vomiting.  Updated the POC of the day.  Prevena dressing CDI with dorsiflex and plantar flex both feet, palpable pulses on both LE.   CMS intact.  SCD's and polar ice use  Educated for IS.  Fall precautions in place, treaded socks on pt, bed in low position.   Call light within reach.   Educated pt to call if needing anything.   Hourly rounding

## 2023-03-01 NOTE — PROGRESS NOTES
"Hospital Medicine Daily Progress Note    Date of Service  2/28/2023    Chief Complaint  Raymundo Jackson is a 72 y.o. male admitted 2/22/2023 with left knee pain and fever    Hospital Course  History of recent left total knee arthroplasty who has been recovering well until 3 days prior to presentation when he noted profuse drainage from his surgical wound followed by sudden onset severe pain with weightbearing.  He presented to the ER and was found to have sepsis secondary to septic arthritis and his prosthetic joint.  Orthopedic surgery was consulted and they recommended emergent I&D    Interval Problem Update  \"2/23: Tolerating antibiotics, cultures growing strep, for possible discontinue vancomycin.  Lactate still high, 1 L fluid bolus repeated.  Blood pressure .  Blood Cx + for strep in 2 of 2 cx    2/24: ID rec continue ctx, Lactate improved to 2.1.  Pain improving.  Repeat blood cx drawn to eval if negative.  Planning repeat washout next week.    2/25: WBC improving change pain meds to tramadol.  Add gabapentin.  Discussed with ID, change antibiotics to Unasyn for less C. difficile risk.  Repeat blood cultures are negative so far.      2/26: Fluids discontinued.  Tolerating Unasyn.  Shooting pain in left lower extremity improving.  D/W wife at bedside    2/27: WBC stable, clindamycin added yesterday due to lower leg swelling and concern for possible neck fashion.  CT of the left lower extremity is pending to ensure no abscess..  Both lower extremities and perineum are swollen, will need Lasix if creatinine is stable tomorrow after CTA.  N.p.o. at midnight for repeat left knee washout tomorrow\"    2/28: Seen after surgery, no complaints, multimodal pain control.  Outpatient antibiotics arranged.     I have discussed this patient's plan of care and discharge plan at IDT rounds today with Case Management, Nursing, Nursing leadership, and other members of the IDT " team.    Consultants/Specialty  orthopedics    Code Status  Full Code    Disposition  Patient is not medically cleared for discharge.   Anticipate discharge to to home with organized home healthcare and close outpatient follow-up.  I have placed the appropriate orders for post-discharge needs.    Review of Systems  Review of Systems   Constitutional:  Negative for chills, fever (Resolved) and malaise/fatigue.   HENT:  Negative for sore throat.    Respiratory:  Negative for cough and shortness of breath.    Cardiovascular:  Positive for leg swelling. Negative for chest pain and palpitations.   Gastrointestinal:  Negative for abdominal pain, blood in stool, diarrhea, heartburn, nausea and vomiting.   Genitourinary:  Negative for dysuria and frequency.   Musculoskeletal:  Positive for joint pain and myalgias. Negative for back pain.   Skin:         Finger wound, wrapped   Neurological:  Negative for dizziness, focal weakness, weakness and headaches.   Psychiatric/Behavioral:  Negative for depression and hallucinations.    All other systems reviewed and are negative.     Physical Exam  Temp:  [36.4 °C (97.5 °F)-37.1 °C (98.8 °F)] 36.7 °C (98.1 °F)  Pulse:  [58-91] 85  Resp:  [12-18] 18  BP: (132-172)/(64-88) 132/88  SpO2:  [92 %-100 %] 92 %    Physical Exam  Constitutional:       Appearance: Normal appearance.   Cardiovascular:      Rate and Rhythm: Normal rate and regular rhythm.      Heart sounds: No murmur heard.  Pulmonary:      Effort: Pulmonary effort is normal. No respiratory distress.      Breath sounds: Normal breath sounds. No stridor.   Abdominal:      General: Abdomen is flat. Bowel sounds are normal. There is no distension.      Palpations: Abdomen is soft.      Tenderness: There is no abdominal tenderness.   Musculoskeletal:         General: Swelling present. No tenderness or deformity.      Right lower leg: Edema present.      Left lower leg: Edema present.      Comments: Left lower leg has medial  erythema and swelling, wound VAC in place over left knee   Skin:     General: Skin is warm and dry.      Coloration: Skin is not pale.      Findings: Erythema present.      Comments: Finger wound wrapped   Neurological:      General: No focal deficit present.      Mental Status: He is alert and oriented to person, place, and time. Mental status is at baseline.       Fluids    Intake/Output Summary (Last 24 hours) at 2/28/2023 1635  Last data filed at 2/28/2023 1600  Gross per 24 hour   Intake 1162.54 ml   Output 3020 ml   Net -1857.46 ml       Laboratory  Recent Labs     02/26/23  0106 02/27/23  0121 02/28/23  0312   WBC 11.0* 11.5* 10.5   RBC 3.18* 3.42* 3.70*   HEMOGLOBIN 10.0* 10.9* 11.6*   HEMATOCRIT 29.9* 32.5* 35.2*   MCV 94.0 95.0 95.1   MCH 31.4 31.9 31.4   MCHC 33.4* 33.5* 33.0*   RDW 45.3 46.8 46.5   PLATELETCT 289 367 444   MPV 11.2 11.0 10.6     Recent Labs     02/26/23  0106 02/27/23  0121 02/28/23  0312   SODIUM 137 135 134*   POTASSIUM 3.6 4.0 4.3   CHLORIDE 106 104 101   CO2 22 21 24   GLUCOSE 170* 138* 129*   BUN 15 13 13   CREATININE 0.75 0.82 0.91   CALCIUM 7.5* 7.7* 8.4                     Imaging  CT-EXTREMITY, LOWER WITH LEFT   Final Result      1.  Status post left knee arthroplasty. Components appear to be in place and intact.      2.  Diffuse edema within left lower extremity could indicate cellulitis.      3.  No abscess identified.      DX-CHEST-PORTABLE (1 VIEW)   Final Result      1.  No acute cardiopulmonary abnormality identified.      2.  Enlarged cardiac silhouette      DX-KNEE 2- LEFT   Final Result         1. Moderate joint effusion, new since prior.             Assessment/Plan  * Infection of prosthetic knee joint, initial encounter ([facility])- (present on admission)  Assessment & Plan  Presented acutely with sepsis secondary to left total knee infection w strep bacteramia  Surgical washout 2/22  Repeat washout planned for tomorrow 2/28  Changed to Unasyn (lower C Dif) + Clindamycin  for concern for nec fasc (f/u CT)  Discussed with ID, follow recommendations  L knee and blood cx 2/22 + Group A Strep  Will need 6 weeks of IV antibiotics from date of his last surgery, 2/28  LE swelling below knee, CT today to rule out abscess / fluid collection which would need to be washed out  Will need PICC line prior to departure      Anasarca  Assessment & Plan  Due to fluids needed for sepsis, he now has been spacing the lower extremities and perineal region  Follow labs    Streptococcal bacteremia  Assessment & Plan  Blood cultures from 2/22 returned positive in 2 of 2 cultures with strep continue IV ceftriaxone  Discussed with ID  Repeat blood cultures on 2/24 NGTD    Finger wound, simple, open- (present on admission)  Assessment & Plan  Dressing changes    Septic arthritis (HCC)- (present on admission)  Assessment & Plan  Secondary to recent total knee which was done in early February by Dr. James  S/P washout 2/22 and repeat planned for next week  Planning for 6 weeks IV  Continue antibiotics per ID  Discussed with ID  Added gabapentin and tramadol for pain    Sepsis (HCC)  Assessment & Plan  He had profound lactic acidosis due to septic left knee  Status post washout for source control on 2/22, repeat washout planned for tmrw  NPO at [place]  Continue empiric antibiotics, Unasyn and Clinda  Blood and L knee cultures so far with group A strep  He has fluid overload and lower extremity/perineal edema from fluids given for sepsis  Resolved    Dyslipidemia- (present on admission)  Assessment & Plan  Continue home ASA and Zocor    Prediabetes  Assessment & Plan  Prediabetic A1c 6.3  Metformin on hold         VTE prophylaxis: SCDs/TEDs and enoxaparin ppx    I have performed a physical exam and reviewed and updated ROS and Plan today (2/28/2023). In review of yesterday's note (2/27/2023), there are no changes except as documented above.

## 2023-03-01 NOTE — PROGRESS NOTES
Received report from dayshift RN. Pt resting in bed, family at bedside. Pt A&O x4, on RA. Pt denies pain at this time. Pt continued on IV abx. Pt updated with POC which includes rest , ambulation and pain mgt. Call light within reach, fall precautions in place, all needs met at this time.

## 2023-03-02 VITALS
RESPIRATION RATE: 16 BRPM | SYSTOLIC BLOOD PRESSURE: 127 MMHG | HEIGHT: 72 IN | DIASTOLIC BLOOD PRESSURE: 66 MMHG | HEART RATE: 57 BPM | WEIGHT: 189 LBS | OXYGEN SATURATION: 94 % | BODY MASS INDEX: 25.6 KG/M2 | TEMPERATURE: 98.1 F

## 2023-03-02 PROCEDURE — 97116 GAIT TRAINING THERAPY: CPT

## 2023-03-02 PROCEDURE — A9270 NON-COVERED ITEM OR SERVICE: HCPCS | Performed by: INTERNAL MEDICINE

## 2023-03-02 PROCEDURE — 700105 HCHG RX REV CODE 258: Performed by: INTERNAL MEDICINE

## 2023-03-02 PROCEDURE — 700111 HCHG RX REV CODE 636 W/ 250 OVERRIDE (IP): Performed by: INTERNAL MEDICINE

## 2023-03-02 PROCEDURE — 97535 SELF CARE MNGMENT TRAINING: CPT

## 2023-03-02 PROCEDURE — 700102 HCHG RX REV CODE 250 W/ 637 OVERRIDE(OP): Performed by: NURSE PRACTITIONER

## 2023-03-02 PROCEDURE — A9270 NON-COVERED ITEM OR SERVICE: HCPCS | Performed by: NURSE PRACTITIONER

## 2023-03-02 PROCEDURE — 97530 THERAPEUTIC ACTIVITIES: CPT

## 2023-03-02 PROCEDURE — 99239 HOSP IP/OBS DSCHRG MGMT >30: CPT | Performed by: INTERNAL MEDICINE

## 2023-03-02 PROCEDURE — 700102 HCHG RX REV CODE 250 W/ 637 OVERRIDE(OP): Performed by: INTERNAL MEDICINE

## 2023-03-02 RX ORDER — TRAMADOL HYDROCHLORIDE 50 MG/1
50 TABLET ORAL EVERY 6 HOURS PRN
Qty: 20 TABLET | Refills: 0 | Status: SHIPPED | OUTPATIENT
Start: 2023-03-02 | End: 2023-03-07

## 2023-03-02 RX ADMIN — GABAPENTIN 100 MG: 100 CAPSULE ORAL at 05:04

## 2023-03-02 RX ADMIN — SIMVASTATIN 10 MG: 10 TABLET, FILM COATED ORAL at 05:04

## 2023-03-02 RX ADMIN — CEFTRIAXONE SODIUM 1000 MG: 1 INJECTION, POWDER, FOR SOLUTION INTRAMUSCULAR; INTRAVENOUS at 12:27

## 2023-03-02 RX ADMIN — GABAPENTIN 100 MG: 100 CAPSULE ORAL at 12:29

## 2023-03-02 RX ADMIN — DAKIN'S SOLUTION 0.125% (QUARTER STRENGTH) 1 ML: 0.12 SOLUTION at 05:07

## 2023-03-02 RX ADMIN — AMPICILLIN AND SULBACTAM 3 G: 1; 2 INJECTION, POWDER, FOR SOLUTION INTRAMUSCULAR; INTRAVENOUS at 04:36

## 2023-03-02 ASSESSMENT — GAIT ASSESSMENTS
ASSISTIVE DEVICE: FRONT WHEEL WALKER
DISTANCE (FEET): 20
GAIT LEVEL OF ASSIST: STANDBY ASSIST

## 2023-03-02 ASSESSMENT — PAIN DESCRIPTION - PAIN TYPE
TYPE: SURGICAL PAIN
TYPE: SURGICAL PAIN

## 2023-03-02 ASSESSMENT — PAIN SCALES - WONG BAKER: WONGBAKER_NUMERICALRESPONSE: HURTS JUST A LITTLE BIT

## 2023-03-02 NOTE — ANESTHESIA POSTPROCEDURE EVALUATION
Patient: Raymundo Jackson    Procedure Summary     Date: 02/28/23 Room / Location:  OR 04 / SURGERY [facility]    Anesthesia Start: 1351 Anesthesia Stop: 1453    Procedure: REVISION, TOTAL ARTHROPLASTY, KNEE, ALL COMPONENTS (Left: Knee) Diagnosis: (infected left TKA)    Surgeons: Brodie Del Valle M.D. Responsible Provider: Urbano Ayala M.D.    Anesthesia Type: general, peripheral nerve block ASA Status: 2          Final Anesthesia Type: general, peripheral nerve block  Last vitals  BP   Blood Pressure : 127/66    Temp   36.7 °C (98.1 °F)    Pulse   (!) 57   Resp   16    SpO2   94 %      Anesthesia Post Evaluation    Patient location during evaluation: PACU  Patient participation: complete - patient participated  Level of consciousness: awake and alert    Airway patency: patent  Anesthetic complications: no  Cardiovascular status: hemodynamically stable  Respiratory status: acceptable  Hydration status: euvolemic    PONV: none          No notable events documented.     Nurse Pain Score: 2 (NPRS)

## 2023-03-02 NOTE — THERAPY
"Occupational Therapy  Discharge      Patient Name: Raymundo Jackson  Age:  72 y.o., Sex:  male  Medical Record #: [id number]  Today's Date: 3/2/2023     Precautions  Precautions: Weight Bearing As Tolerated Left Lower Extremity  Comments: hemovac    Assessment    Pt reports he is mobilizing well since recent surgery and is familiar with ADl's post TKA.  Has assist from spouse, raised toilet seat, can't use shower chair in his small shower.  He plans to do sponge bathing until cleared medically to shower.  Rec  therapy to help with ABX mgmt and bathing initially.      Plan    Reason for Discharge From Therapy: Other - Comments (some goals met, pt declines need for further OT intervention at this time)    DC Equipment Recommendations: None  Discharge Recommendations: Recommend home health for continued occupational therapy services    Subjective    \" I think I am good with all that stuff, thanks.\"     Objective       03/02/23 0907   Cognition    Cognition / Consciousness WDL   Level of Consciousness Alert   Activities of Daily Living   Comments Pt reports he has raised toilet seat at home and is comfortable with the idea of doing sponge baths until he is medically cleared to shower and is able to tolerate standing in the shower since he cannot fit a shower chair in his shower.  Spouse also avail to help with bathing and dressing as needed   Functional Mobility   Comments Pt reports he has been mobilizing in room, to BR and in halls with nursing and with PT   Patient / Family Goals   Patient / Family Goal #1 home   Goal #1 Outcome Progressing as expected   Short Term Goals   Short Term Goal # 1 Pt will perform LB dressing with SBA, AE as needed, within 5 days   Goal Outcome # 1 Other (see comments)  (pt declined further need for OT)   Short Term Goal # 2 Pt will perform ADL transfers with Spv/Mod Indep within 5 days   Goal Outcome # 2 Progressing as expected  (per pt report)   Short Term Goal # 3 Pt will tolerate UIC " for 3/3 meals each day.   Goal Outcome # 3   (not assessed)   Education Group   Education Provided Activities of Daily Living;Adaptive Equipment;Home Safety   Home Safety Patient Response Patient;Acceptance;Explanation;Verbal Demonstration   ADL Patient Response Patient;Acceptance;Explanation;Verbal Demonstration   Adaptive Equipment Patient Response Patient;Acceptance;Explanation;Verbal Demonstration   Additional Comments see above   Occupational Therapy Treatment Plan    O.T. Treatment Plan Modify Current Treatment Plan   Reason For Discharge Other - Comments  (some goals met, pt declines need for further OT intervention at this time)   Interdisciplinary Plan of Care Collaboration   Collaboration Comments OT reviewed OT needs with pt post I&D on L knee.  Pt has all needed AE at home for ADl's and assist from spouse. He is familiar with ADl's post TKA from recent original joint replacement.  No further OT needs in this setting, will d/c from OT at this time.

## 2023-03-02 NOTE — DISCHARGE PLANNING
Case Management Discharge Planning    Admission Date: 2/22/2023  GMLOS: 8  ALOS: 6    6-Clicks ADL Score: 22  6-Clicks Mobility Score: 18      Anticipated Discharge Dispo: Discharge Disposition: D/T to home under HHA care in anticipation of covered skilled care (06)    DME Needed: No    Action(s) Taken: Received call from Andreina from [facility].   Per Andreina, she has been following pt's case and stated [facility] can cover 100% of home IV ABX cost if established with [facility]. They can also cover HH cost if referral sent [facility].     RNCM spoke to Sultana from [facility]. Per Sultana, pt agreed to copay of $974.76.     RNCM spoke to pt via phone call. Pt would like RNCM to send IV ABX referral to [facility] to be covered with [facility] benefits. Also received verbal approval for 1)[facility]  2)[facility]. Choice forms completed and faxed to [facility].    Escalations Completed: None    Medically Clear: No    Next Steps:  f/u with medical team to discuss DC needs and barriers    Barriers to Discharge: Medical clearance and Outpatient Infusion required

## 2023-03-02 NOTE — DISCHARGE PLANNING
HTH/SCP TCN chart review completed. Collaborated with MASOOD Finch. Discussed current discharge considerations, noted to home with HHC and IV infusion. Please see previous CM note (3/1/2023) noting HHC and IV infusion coordinated through patient [facility] benefit. Given this, TCN remains available to further collaborate if needs arise necessitating TCN involvement; however, TCN will defer to CM for any further discharge planning needs. Thank you.     Completed:  [facility] referral sent 2/23/2023

## 2023-03-02 NOTE — PROGRESS NOTES
Educated patient and wife on discharge instructions, rx medications  at [facility] pharmacy at [place] and follow up with Rafal in 10-14 days       Patient and family voiced understanding . VS stable /66   Pulse (!) 57   Temp 36.7 °C (98.1 °F) (Temporal)   Resp 16   Ht 1.829 m (6')   Wt 85.7 kg (189 lb)   SpO2 94%   BMI 25.63 kg/m²    Patient alert and appropriate. All questions and concerns addressed. No further questions or concerns at this time. Copy of discharge paperwork provided.  Patient out of department with transport in stable condition.

## 2023-03-02 NOTE — CARE PLAN
The patient is Stable - Low risk of patient condition declining or worsening    Shift Goals  Clinical Goals: Pt will ambulate and for PICC insertion this shift  Patient Goals: rest comfortably  Family Goals: ambulation    Progress made toward(s) clinical / shift goals:  PICC line in place. Pt able to ambulate using FWW with assist. Pain has been controlled throughout the shift.    Patient is not progressing towards the following goals:      Problem: Fall Risk  Goal: Patient will remain free from falls  Outcome: Progressing     Problem: Early Mobilization - Post Surgery  Goal: Early mobilization post surgery  Outcome: Progressing     Problem: Pain - Post Surgery  Goal: Alleviation or reduction of pain post surgery  Outcome: Progressing     Problem: Wound/ / Incision Healing  Goal: Patient's wound/surgical incision will decrease in size and heals properly  Outcome: Progressing

## 2023-03-02 NOTE — DISCHARGE PLANNING
Case Management Discharge Planning    Admission Date: 2/22/2023  GMLOS: 8  ALOS: 7    6-Clicks ADL Score: 22  6-Clicks Mobility Score: 18      Anticipated Discharge Dispo: Discharge Disposition: D/T to home under A care in anticipation of covered skilled care (06)    DME Needed: No    Action(s) Taken: Updated Provider/Nurse on Discharge Plan    1015: TONY HOFFMAN spoke with Zoey from [facility] who states she will review referral and call RN CM back. Per Babita JIMENEZ CM, [facility] states they have sent auth to [facility].  TONY HOFFMAN called Francoise with [facility] ([phone]) who states she will call RN DALTON back once everything is set up.       1243: Per Francoise, patient has been accepted to [facility].  TONY HOFFMAN called Zoey to verify, no answer, VM left requesting call back. Francoise also stating that patient will need 1 dose of ceftriaxone prior to DC. MD and RN notified. Teach has been done per Francoise. RN DALTON spoke with patient and provided an update.     Escalations Completed: None    Medically Clear: Yes    Next Steps: DC home with [facility] Infusion services and [facility]     Barriers to Discharge: 1 dose of Ceftriaxone

## 2023-03-02 NOTE — CARE PLAN
The patient is Stable - Low risk of patient condition declining or worsening    Shift Goals  Clinical Goals: Pt will tolerate pain after pain meds administration; continue on IV abx.  Patient Goals: Sleep and pain mgt.  Family Goals: n/a    Progress made toward(s) clinical / shift goals:    Pt's pain tolerable after giving PRN pain meds once; continued on IV abx.    Patient is not progressing towards the following goals: n/a

## 2023-03-02 NOTE — DISCHARGE SUMMARY
"Discharge Summary    CHIEF COMPLAINT ON ADMISSION  Chief Complaint   Patient presents with    Knee Pain       Reason for Admission  post-op complications     Admission Date  2/22/2023    CODE STATUS  Full Code    HPI & HOSPITAL COURSE  Per notes, \"72 y.o. male who was referred to hospitalist medicine service for concern of sepsis in setting of total knee arthroplasty.  Patient underwent washout and drainage today surgically.      His hemodynamics are concerning earlier today he spiked a temp up to 104.5 with tachypnea at the time he was oxygenating well and his blood pressure was actually mildly elevated..  However since then his blood pressure has become soft and he is borderline hypoxemic with SPO2 in the 90s.  He also has leukocytosis 23.3 with leftward shift.  Lactic acid this morning was elevated at 2.3 it is being redrawn now at 6:56 PM.  He has already been bolused once with greater than 2.7 L I have ordered second bolus per sepsis protocol, as his blood pressure is now soft, 's.\"     Patient was admitted for left knee pain and fever.  He was started on IV antibiotics for suspected joint infection.  Infectious disease recommended to continue antibiotics, follow-up cultures.  He went in for a washout of the joint on 2/22 and 2/28.  Outpatient antibiotics were arranged, as well as home health with physical therapy and outpatient therapy.  Patient will need to follow-up with orthopedic surgery, PT/OT and infectious ease in the outpatient setting.    Therefore, he is discharged in good and stable condition to home with organized home healthcare and close outpatient follow-up.    The patient met 2-midnight criteria for an inpatient stay at the time of discharge.    Discharge Date  3/2/2023    FOLLOW UP ITEMS POST DISCHARGE  FU with orthopedic surgery  Follow-up with infectious disease  Follow-up with PCP    DISCHARGE DIAGNOSES  Principal Problem:    Infection of prosthetic knee joint, initial encounter " (HCC) POA: Yes  Active Problems:    Prediabetes POA: Unknown    Dyslipidemia POA: Yes    Sepsis (HCC) POA: Unknown    Septic arthritis (HCC) POA: Yes    Finger wound, simple, open POA: Yes    Streptococcal bacteremia POA: Unknown    Anasarca POA: Unknown  Resolved Problems:    * No resolved hospital problems. *      FOLLOW UP  Future Appointments   Date Time Provider Department Center   3/23/2023 11:00 AM DINESH Alberts [facility]     No follow-up provider specified.    MEDICATIONS ON DISCHARGE     Medication List        START taking these medications        Instructions   traMADol 50 MG Tabs  Commonly known as: Ultram   Take 1 Tablet by mouth every 6 hours as needed for Severe Pain for up to 5 days.  Dose: 50 mg            CONTINUE taking these medications        Instructions   acetaminophen 500 MG Tabs  Commonly known as: TYLENOL   Take 1,000 mg by mouth every 6 hours as needed. Indications: Pain  Dose: 1,000 mg     aspirin 325 MG Tabs  Commonly known as: ASA   Take 650 mg by mouth every 6 hours as needed. Indications: Pain  Dose: 650 mg     CALCIUM CARBONATE PO   Take 1 Tablet by mouth every day.  Dose: 1 Tablet     D3 PO   Take 1 Capsule by mouth every day.  Dose: 1 Capsule     HYDROcodone-acetaminophen 5-325 MG Tabs per tablet  Commonly known as: NORCO   Take 1 Tablet by mouth every 6 hours as needed. Indications: Pain  Dose: 1 Tablet     IRON PO   Take 1 Tablet by mouth every day.  Dose: 1 Tablet     metFORMIN 500 MG Tabs  Commonly known as: GLUCOPHAGE   Take 500 mg by mouth every day. In AM  Dose: 500 mg     simvastatin 10 MG Tabs  Commonly known as: ZOCOR   Take 10 mg by mouth every day. In AM  Dose: 10 mg     VITAMIN C PO   Take 1 Tablet by mouth every day.  Dose: 1 Tablet     * vitamin e 400 UNIT Caps  Commonly known as: VITAMIN E   Take 400 Units by mouth every day.  Dose: 400 Units     * VITAMIN E PO   Take 1 Tablet by mouth every day.  Dose: 1 Tablet           * This list has 2 medication(s)  that are the same as other medications prescribed for you. Read the directions carefully, and ask your doctor or other care provider to review them with you.                  Allergies  Allergies   Allergen Reactions    Oxycodone Unspecified     Sees double and triple vision        DIET  Orders Placed This Encounter   Procedures    Diet Order Diet: Regular     Standing Status:   Standing     Number of Occurrences:   1     Order Specific Question:   Diet:     Answer:   Regular [1]       ACTIVITY  As tolerated.  Weight bearing as tolerated    CONSULTATIONS  Orthopedic surgery  Infectious disease    PROCEDURES  Washout of infected joint    LABORATORY  Lab Results   Component Value Date    SODIUM 135 03/01/2023    POTASSIUM 4.9 03/01/2023    CHLORIDE 104 03/01/2023    CO2 23 03/01/2023    GLUCOSE 180 (H) 03/01/2023    BUN 18 03/01/2023    CREATININE 0.94 03/01/2023        Lab Results   Component Value Date    WBC 12.7 (H) 03/01/2023    HEMOGLOBIN 10.7 (L) 03/01/2023    HEMATOCRIT 33.6 (L) 03/01/2023    PLATELETCT 442 03/01/2023        Total time of the discharge process exceeds 38 minutes.

## 2023-03-02 NOTE — THERAPY
Physical Therapy   Daily Treatment     Patient Name: Raymundo Jackson  Age:  72 y.o., Sex:  male  Medical Record #: [id number]  Today's Date: 3/2/2023     Precautions  Precautions: Weight Bearing As Tolerated Left Lower Extremity    Assessment    Pt transferred from chair to wheel chair and then from wheelchair into car     Plan    Treatment Plan Status: Continue Current Treatment Plan  Type of Treatment: Bed Mobility, Equipment, Manual Therapy, Gait Training, Neuro Re-Education / Balance, Self Care / Home Evaluation, Stair Training, Therapeutic Activities, Therapeutic Exercise  Treatment Frequency: Daily  Treatment Duration: Until Therapy Goals Met    DC Equipment Recommendations: Raised Toilet Seat with Arms  Discharge Recommendations: Recommend outpatient physical therapy services to address higher level deficits     Objective       03/02/23 1500   Charge Group   PT Gait Training (Units) 1   PT Therapeutic Activities (Units) 1   Balance   Sitting Balance (Static) Good   Sitting Balance (Dynamic) Good   Standing Balance (Static) Fair   Standing Balance (Dynamic) Fair   Weight Shift Sitting Fair   Weight Shift Standing Fair   Gait Analysis   Gait Level Of Assist Standby Assist   Assistive Device Front Wheel Walker   Distance (Feet) 20   # of Times Distance was Traveled 2   Functional Mobility   Sit to Stand Standby Assist   Bed, Chair, Wheelchair Transfer Standby Assist   Transfer Method Stand Step   Activity Tolerance   Sitting in Chair > 1hr   Standing 7   Short Term Goals    Short Term Goal # 1 pt will go supine<>sit w/hob flat and rails down w/spv in 6tx   Goal Outcome # 1 Progressing as expected   Short Term Goal # 2 pt will go sit<>stand w/fww w/spv in 6tx   Goal Outcome # 2 Progressing as expected   Short Term Goal # 3 pt will transfer bed<>chair w/fww w/spv in 6tx   Goal Outcome # 3 Progressing as expected   Short Term Goal # 4 pt will ambulate for 150ft w/fww w/spv in 6tx   Goal Outcome # 4 Progressing as  expected   Interdisciplinary Plan of Care Collaboration   IDT Collaboration with  Nursing   Session Information   Date / Session Number  3/2-6 6/7 3/1

## 2023-03-03 ENCOUNTER — TELEPHONE (OUTPATIENT)
Dept: INFECTIOUS DISEASES | Facility: MEDICAL CENTER | Age: 73
End: 2023-03-03
Payer: MEDICARE

## 2023-03-03 NOTE — TELEPHONE ENCOUNTER
Spoke with patient to schedule a 2 week [facility] appt. [facility] and [facility] have [facility] that he follow up with Dr Fischer. Patient is scheduled next week to see his PCP and they will then arrange follow up with Dr Fischer. I advised I would hold off on scheduling for now and that we will continue to follow up on any labs that are forwarded here. He wishes to thank the team for everything that has been done, however when he found out that his benefits with [facility] would pay almost all cost he wishes to remain with [facility]. I said we Totally understand this.

## 2023-03-23 PROBLEM — E11.69 HYPERLIPIDEMIA ASSOCIATED WITH TYPE 2 DIABETES MELLITUS (HCC): Status: ACTIVE | Noted: 2023-03-23

## 2023-03-23 PROBLEM — E78.5 HYPERLIPIDEMIA ASSOCIATED WITH TYPE 2 DIABETES MELLITUS (HCC): Status: ACTIVE | Noted: 2023-03-23

## 2023-05-16 PROBLEM — Z71.89 OTHER SPECIFIED COUNSELING: Status: ACTIVE | Noted: 2023-01-01

## 2023-05-16 PROBLEM — L85.3 XEROSIS CUTIS: Status: ACTIVE | Noted: 2023-01-01

## 2023-05-16 PROBLEM — L21.8 OTHER SEBORRHEIC DERMATITIS: Status: ACTIVE | Noted: 2023-01-01

## 2023-05-16 PROBLEM — L81.4 OTHER MELANIN HYPERPIGMENTATION: Status: ACTIVE | Noted: 2023-01-01

## 2023-05-16 PROBLEM — D18.01 HEMANGIOMA OF SKIN AND SUBCUTANEOUS TISSUE: Status: ACTIVE | Noted: 2023-01-01

## 2023-05-16 PROBLEM — L82.1 OTHER SEBORRHEIC KERATOSIS: Status: ACTIVE | Noted: 2023-01-01

## 2023-05-16 PROBLEM — Z85.820 PERSONAL HISTORY OF MALIGNANT MELANOMA OF SKIN: Status: ACTIVE | Noted: 2023-01-01

## 2023-05-16 NOTE — HPI: EVALUATION OF SKIN LESION(S)
What Type Of Note Output Would You Prefer (Optional)?: Standard Output
How Severe Are Your Spot(S)?: mild
Have Your Spot(S) Been Treated In The Past?: has not been treated
Hpi Title: Evaluation of Skin Lesions
Location: Left forearm
Year Removed: 2018
No

## 2024-03-27 ENCOUNTER — TELEPHONE (OUTPATIENT)
Dept: HEALTH INFORMATION MANAGEMENT | Facility: OTHER | Age: 74
End: 2024-03-27
Payer: MEDICARE

## 2024-04-08 PROBLEM — E11.69 HYPERLIPIDEMIA ASSOCIATED WITH TYPE 2 DIABETES MELLITUS (HCC): Status: ACTIVE | Noted: 2022-01-19

## 2024-04-08 PROBLEM — E78.5 HYPERLIPIDEMIA ASSOCIATED WITH TYPE 2 DIABETES MELLITUS (HCC): Status: ACTIVE | Noted: 2022-01-19

## 2024-04-08 NOTE — ASSESSMENT & PLAN NOTE
BMI is 25.36 kg/m2. Provided education on heart healthy diet with adequate intake of fruits, vegetables, and whole grains. Encourage 30 minutes of moderate exercise 3-4 times a week.

## 2024-04-08 NOTE — ASSESSMENT & PLAN NOTE
Chronic, stable. Currently taking simvastatin 10 mg daily. Reports that he is not exercising regularly. Denies chest pain and claudication.

## 2024-04-08 NOTE — ASSESSMENT & PLAN NOTE
Chronic, stable. Most recent hemoglobin A1C was 6.3 in February 2023. Currently taking metformin 500 mg daily. Routinely monitored by primary care provider.  --In-office urine protein screening ordered.

## 2024-04-08 NOTE — ASSESSMENT & PLAN NOTE
Chronic, stable. Currently taking simvastatin 10 mg daily. Reports that he rides 3 miles on his stationary bicycle every morning and uses weights daily. Denies chest pain and claudication.

## 2024-04-10 ENCOUNTER — HOSPITAL ENCOUNTER (OUTPATIENT)
Facility: MEDICAL CENTER | Age: 74
End: 2024-04-10
Payer: MEDICARE

## 2024-04-10 ENCOUNTER — OFFICE VISIT (OUTPATIENT)
Dept: FAMILY PLANNING/WOMEN'S HEALTH CLINIC | Facility: PHYSICIAN GROUP | Age: 74
End: 2024-04-10
Payer: MEDICARE

## 2024-04-10 VITALS
WEIGHT: 187 LBS | SYSTOLIC BLOOD PRESSURE: 118 MMHG | BODY MASS INDEX: 25.33 KG/M2 | DIASTOLIC BLOOD PRESSURE: 68 MMHG | HEIGHT: 72 IN

## 2024-04-10 DIAGNOSIS — E11.65 TYPE 2 DIABETES MELLITUS WITH HYPERGLYCEMIA, WITHOUT LONG-TERM CURRENT USE OF INSULIN (HCC): ICD-10-CM

## 2024-04-10 DIAGNOSIS — Z96.659 INFECTION OF PROSTHETIC KNEE JOINT, SUBSEQUENT ENCOUNTER: ICD-10-CM

## 2024-04-10 DIAGNOSIS — Z12.12 ENCOUNTER FOR COLORECTAL CANCER SCREENING: ICD-10-CM

## 2024-04-10 DIAGNOSIS — T84.59XD INFECTION OF PROSTHETIC KNEE JOINT, SUBSEQUENT ENCOUNTER: ICD-10-CM

## 2024-04-10 DIAGNOSIS — Z12.11 ENCOUNTER FOR COLORECTAL CANCER SCREENING: ICD-10-CM

## 2024-04-10 DIAGNOSIS — E78.5 HYPERLIPIDEMIA ASSOCIATED WITH TYPE 2 DIABETES MELLITUS (HCC): ICD-10-CM

## 2024-04-10 DIAGNOSIS — Z86.19 HISTORY OF SEPSIS: ICD-10-CM

## 2024-04-10 DIAGNOSIS — E11.69 HYPERLIPIDEMIA ASSOCIATED WITH TYPE 2 DIABETES MELLITUS (HCC): ICD-10-CM

## 2024-04-10 PROBLEM — B95.5 STREPTOCOCCAL BACTEREMIA: Status: RESOLVED | Noted: 2023-02-23 | Resolved: 2024-04-10

## 2024-04-10 PROBLEM — R60.1 ANASARCA: Status: RESOLVED | Noted: 2023-02-27 | Resolved: 2024-04-10

## 2024-04-10 PROBLEM — M00.9 SEPTIC ARTHRITIS (HCC): Status: RESOLVED | Noted: 2023-02-23 | Resolved: 2024-04-10

## 2024-04-10 PROBLEM — R78.81 STREPTOCOCCAL BACTEREMIA: Status: RESOLVED | Noted: 2023-02-23 | Resolved: 2024-04-10

## 2024-04-10 PROBLEM — M17.10 KNEE ARTHROPATHY: Status: RESOLVED | Noted: 2022-09-07 | Resolved: 2024-04-10

## 2024-04-10 PROBLEM — S61.209A FINGER WOUND, SIMPLE, OPEN: Status: RESOLVED | Noted: 2023-02-23 | Resolved: 2024-04-10

## 2024-04-10 PROCEDURE — 3078F DIAST BP <80 MM HG: CPT

## 2024-04-10 PROCEDURE — 82570 ASSAY OF URINE CREATININE: CPT

## 2024-04-10 PROCEDURE — G0439 PPPS, SUBSEQ VISIT: HCPCS

## 2024-04-10 PROCEDURE — 82043 UR ALBUMIN QUANTITATIVE: CPT

## 2024-04-10 PROCEDURE — 3074F SYST BP LT 130 MM HG: CPT

## 2024-04-10 PROCEDURE — 1126F AMNT PAIN NOTED NONE PRSNT: CPT

## 2024-04-10 SDOH — ECONOMIC STABILITY: TRANSPORTATION INSECURITY
IN THE PAST 12 MONTHS, HAS THE LACK OF TRANSPORTATION KEPT YOU FROM MEDICAL APPOINTMENTS OR FROM GETTING MEDICATIONS?: NO

## 2024-04-10 SDOH — ECONOMIC STABILITY: INCOME INSECURITY: IN THE LAST 12 MONTHS, WAS THERE A TIME WHEN YOU WERE NOT ABLE TO PAY THE MORTGAGE OR RENT ON TIME?: NO

## 2024-04-10 SDOH — ECONOMIC STABILITY: FOOD INSECURITY: WITHIN THE PAST 12 MONTHS, THE FOOD YOU BOUGHT JUST DIDN'T LAST AND YOU DIDN'T HAVE MONEY TO GET MORE.: NEVER TRUE

## 2024-04-10 SDOH — ECONOMIC STABILITY: TRANSPORTATION INSECURITY
IN THE PAST 12 MONTHS, HAS LACK OF TRANSPORTATION KEPT YOU FROM MEETINGS, WORK, OR FROM GETTING THINGS NEEDED FOR DAILY LIVING?: NO

## 2024-04-10 SDOH — ECONOMIC STABILITY: FOOD INSECURITY: WITHIN THE PAST 12 MONTHS, YOU WORRIED THAT YOUR FOOD WOULD RUN OUT BEFORE YOU GOT MONEY TO BUY MORE.: NEVER TRUE

## 2024-04-10 SDOH — ECONOMIC STABILITY: HOUSING INSECURITY
IN THE LAST 12 MONTHS, WAS THERE A TIME WHEN YOU DID NOT HAVE A STEADY PLACE TO SLEEP OR SLEPT IN A SHELTER (INCLUDING NOW)?: NO

## 2024-04-10 SDOH — ECONOMIC STABILITY: HOUSING INSECURITY: IN THE LAST 12 MONTHS, HOW MANY PLACES HAVE YOU LIVED?: 1

## 2024-04-10 SDOH — ECONOMIC STABILITY: INCOME INSECURITY: HOW HARD IS IT FOR YOU TO PAY FOR THE VERY BASICS LIKE FOOD, HOUSING, MEDICAL CARE, AND HEATING?: NOT HARD AT ALL

## 2024-04-10 ASSESSMENT — PATIENT HEALTH QUESTIONNAIRE - PHQ9: CLINICAL INTERPRETATION OF PHQ2 SCORE: 0

## 2024-04-10 ASSESSMENT — ACTIVITIES OF DAILY LIVING (ADL): BATHING_REQUIRES_ASSISTANCE: 0

## 2024-04-10 ASSESSMENT — ENCOUNTER SYMPTOMS: GENERAL WELL-BEING: FAIR

## 2024-04-10 ASSESSMENT — PAIN SCALES - GENERAL: PAINLEVEL: NO PAIN

## 2024-04-10 ASSESSMENT — FIBROSIS 4 INDEX: FIB4 SCORE: 0.75

## 2024-04-10 NOTE — ASSESSMENT & PLAN NOTE
Chronic, ongoing. Reports completion of IV antibiotics. Currently taking penicillin 2,000 mg daily.

## 2024-04-10 NOTE — PROGRESS NOTES
Comprehensive Health Assessment Program     Raymundo Jackson is a 73 y.o. here for his comprehensive health assessment.    Patient Active Problem List    Diagnosis Date Noted    Encounter for colorectal cancer screening 04/10/2024    Infection of prosthetic knee joint (HCC) 02/22/2023    History of sepsis 02/22/2023    Type 2 diabetes mellitus with hyperglycemia, without long-term current use of insulin (HCC) 01/19/2022    Agent orange exposure 01/19/2022    BMI 25.0-25.9,adult 01/19/2022    Hyperlipidemia associated with type 2 diabetes mellitus ([facility]) 01/19/2022       Current Outpatient Medications   Medication Sig Dispense Refill    Calcium Carbonate Antacid (CALCIUM CARBONATE PO) Take 1 Tablet by mouth every day.      Ferrous Sulfate (IRON PO) Take 1 Tablet by mouth every day.      VITAMIN E PO Take 1 Tablet by mouth every day.      aspirin (ASA) 325 MG Tab Take 650 mg by mouth every 6 hours as needed. Indications: Pain      vitamin e (VITAMIN E) 400 UNIT Cap Take 400 Units by mouth every day.      Ascorbic Acid (VITAMIN C PO) Take 1 Tablet by mouth every day.      acetaminophen (TYLENOL) 500 MG Tab Take 1,000 mg by mouth every 6 hours as needed. Indications: Pain      metFORMIN (GLUCOPHAGE) 500 MG Tab Take 500 mg by mouth every day. In AM      simvastatin (ZOCOR) 10 MG Tab Take 10 mg by mouth every day. In AM      Cholecalciferol (D3 PO) Take 1 Capsule by mouth every day.      HYDROcodone-acetaminophen (NORCO) 5-325 MG Tab per tablet Take 1 Tablet by mouth every 6 hours as needed. Indications: Pain (Patient not taking: Reported on 3/23/2023)       No current facility-administered medications for this visit.          Current supplements as per medication list.     Allergies:   Oxycodone  Social History     Tobacco Use    Smoking status: Never    Smokeless tobacco: Never   Vaping Use    Vaping Use: Never used   Substance Use Topics    Alcohol use: Never    Drug use: Never     History reviewed. No pertinent  family history.  Raymundo  has a past medical history of Anasarca (02/27/2023), Arthritis, Awareness under anesthesia, Dental disorder, Diabetes ([facility]), Disorder of thyroid, Finger wound, simple, open (02/23/2023), Hemorrhagic disorder ([facility]), Hepatitis C, High cholesterol, Knee arthropathy (09/07/2022), Pain, Septic arthritis (HCC) (02/23/2023), and Streptococcal bacteremia (02/23/2023).   Past Surgical History:   Procedure Laterality Date    PB REVISE KNEE JOINT REPLACE,ALL PARTS Left 2/28/2023    Procedure: REVISION, TOTAL ARTHROPLASTY, KNEE, ALL COMPONENTS;  Surgeon: Brodie Del Valle M.D.;  Location: [facility];  Service: Orthopedics    IRRIGATION & DEBRIDEMENT GENERAL Left 2/22/2023    Procedure: IRRIGATION AND DEBRIDEMENT, WOUND;  Surgeon: Brodie Del Valle M.D.;  Location: [facility];  Service: Orthopedics    PB TOTAL KNEE ARTHROPLASTY Left 2/1/2023    Procedure: LEFT TOTAL KNEE ARTHROPLASTY;  Surgeon: Brodie Del Valle M.D.;  Location: [facility];  Service: Orthopedics    PB TOTAL KNEE ARTHROPLASTY Right 09/07/2022    Procedure: RIGHT KNEE TOTAL ARTHROPLASTY;  Surgeon: Brodie Del Valle M.D.;  Location: [facility];  Service: Orthopedics    KNEE ARTHROSCOPY Right     KNEE ARTHROSCOPY Right     TONSILLECTOMY         Screening:  In the last six months have you experienced any leakage of urine? No    Depression Screening  Little interest or pleasure in doing things?  0 - not at all  Feeling down, depressed , or hopeless? 0 - not at all  Patient Health Questionnaire Score: 0     If depressive symptoms identified deferred to follow up visit unless specifically addressed in assessment and plan.    Interpretation of PHQ-9 Total Score   Score Severity   1-4 No Depression   5-9 Mild Depression   10-14 Moderate Depression   15-19 Moderately Severe Depression   20-27 Severe Depression    Screening for Cognitive Impairment  Do you or any of your friends or family  members have any concern about your memory? No  Three Minute Recall (Leader, Season, Table) 3/3    Duran clock face with all 12 numbers and set the hands to show 10 minutes after 11.  Yes 5/5  Cognitive concerns identified deferred for follow up unless specifically addressed in assessment and plan.    Fall Risk Assessment  Has the patient had two or more falls in the last year or any fall with injury in the last year?  No    Safety Assessment  Do you always wear your seatbelt?  Yes  Any changes to home needed to function safely? No  Difficulty hearing.  Yes  Patient counseled about all safety risks that were identified.    Functional Assessment ADLs  Are there any barriers preventing you from cooking for yourself or meeting nutritional needs?  No.    Are there any barriers preventing you from driving safely or obtaining transportation?  No.    Are there any barriers preventing you from using a telephone or calling for help?  No    Are there any barriers preventing you from shopping?  No.    Are there any barriers preventing you from taking care of your own finances?  No    Are there any barriers preventing you from managing your medications?  No    Are there any barriers preventing you from showering, bathing or dressing yourself? No    Are there any barriers preventing you from doing housework or laundry? No  Are there any barriers preventing you from using the toilet?No  Are you currently engaging in any exercise or physical activity?  Yes. Patient reports he runs 3 miles every morning on the treadmill and weight lift.    Self-Assessment of Health  What is your perception of your health? Fair  Do you sleep more than six hours a night? Yes  In the past 7 days, how much did pain keep you from doing your normal work? None  Do you spend quality time with family or friends (virtually or in person)? Yes  Do you usually eat a heart healthy diet that constists of a variety of fruits, vegetables, whole grains and fiber?  Yes  Do you eat foods high in fat and/or Fast Food more than three times per week? No    Advance Care Planning  Do you have an Advance Directive, Living Will, Durable Power of , or POLST? Yes          is not on file - instructed patient to bring in a copy to scan into their chart      Health Maintenance Summary            Overdue - Hepatitis C Screening (Once) Never done      No completion history exists for this topic.              Overdue - Fasting Lipid Profile (Yearly) Never done      No completion history exists for this topic.              Ordered - Diabetes: Urine Protein Screening (Yearly) Ordered on 4/10/2024      No completion history exists for this topic.              Ordered - Colorectal Cancer Screening (View Topic Details) Ordered on 4/10/2024      No completion history exists for this topic.              Overdue - A1c Screening (Every 6 Months) Overdue since 8/22/2023 02/22/2023  HEMOGLOBIN A1C    01/19/2023  Hemoglobin A1c              Overdue - COVID-19 Vaccine (5 - 2023-24 season) Overdue since 9/1/2023 12/22/2022  Outside Immunization: COVID mRNA Bivalent(MOD)    07/18/2022  Outside Immunization: COVID-19 (MODERNA), MRNA, LNP-S, PF, 100 MCG/0.5ML DOSE OR 50 MCG/0.25ML DOSE    10/28/2021  Outside Immunization: COVID-19 (MODERNA), MRNA, LNP-S, PF, 100 MCG/0.5 ML DOSE    03/24/2021  Outside Immunization: COVID-19 (MODERNA), MRNA, LNP-S, PF, 100 MCG/0.5 ML DOSE    02/23/2021  Outside Immunization: COVID-19 (MODERNA), MRNA, LNP-S, PF, 100 MCG/0.5 ML DOSE    Only the first 5 history entries have been loaded, but more history exists.              Overdue - SERUM CREATININE (Yearly) Overdue since 3/1/2024      03/01/2023  Basic Metabolic Panel    02/28/2023  Renal Function Panel    02/27/2023  Renal Function Panel    02/26/2023  Renal Function Panel    02/25/2023  Renal Function Panel    Only the first 5 history entries have been loaded, but more history exists.              Overdue -  Diabetes: Monofilament / LE Exam (Yearly) Overdue since 3/23/2024      03/23/2023  Done              Diabetes: Retinopathy Screening (Yearly) Due soon on 4/21/2024 04/21/2023  Level of Service: FL EYE EXAM, EST PATIENT,COMPREHESV              Annual Wellness Visit (Yearly) Next due on 4/10/2025      04/10/2024  Level of Service: FL ANNUAL WELLNESS VISIT-INCLUDES PPPS SUBSEQUE*    03/23/2023  Level of Service: FL ANNUAL WELLNESS VISIT-INCLUDES PPPS SUBSEQUE*    01/19/2022  Level of Service: FL ANNUAL WELLNESS VISIT-INCLUDES PPPS SUBSEQUE*              IMM DTaP/Tdap/Td Vaccine (3 - Td or Tdap) Next due on 9/26/2033 09/26/2023  Outside Immunization: Tdap    10/03/2011  Outside Immunization: TDAP              Pneumococcal Vaccine: 65+ Years (Series Information) Completed      08/13/2020  Outside Immunization: PNEUMOCOCCAL POLYSACCHARIDE PPV23    06/08/2015  Outside Immunization: PNEUMOCOCCAL CONJUGATE PCV 13              Zoster (Shingles) Vaccines (Series Information) Completed      12/16/2021  Outside Immunization: ZOSTER RECOMBINANT    10/07/2021  Outside Immunization: ZOSTER RECOMBINANT    08/05/2014  Outside Immunization: ZOSTER LIVE              Influenza Vaccine (Series Information) Completed      10/23/2023  Outside Immunization: Fluzone High-Dose Quad    01/04/2023  Outside Immunization: Flu MDCK Quad P-Free Inj    10/07/2021  Outside Immunization: INFLUENZA, INJECTABLE, QUADRIVALENT, PRESERVATIVE FREE    11/16/2020  Outside Immunization: INFLUENZA, INJECTABLE, QUADRIVALENT, PRESERVATIVE FREE    10/11/2019  Outside Immunization: INFLUENZA, SEASONAL, INJECTABLE, PRESERVATIVE FREE    Only the first 5 history entries have been loaded, but more history exists.              Hepatitis A Vaccine (Hep A) (Series Information) Aged Out      No completion history exists for this topic.              Hepatitis B Vaccine (Hep B) (Series Information) Aged Out      No completion history exists for this topic.               HPV Vaccines (Series Information) Aged Out      No completion history exists for this topic.              Polio Vaccine (Inactivated Polio) (Series Information) Aged Out      No completion history exists for this topic.              Meningococcal Immunization (Series Information) Aged Out      No completion history exists for this topic.                    Patient Care Team:  Edenilson Simpson M.D. as PCP - General (Internal Medicine)      Financial Resource Strain: Low Risk  (4/10/2024)    Overall Financial Resource Strain (CARDIA)     Difficulty of Paying Living Expenses: Not hard at all      Transportation Needs: No Transportation Needs (4/10/2024)    PRAPARE - Transportation     Lack of Transportation (Medical): No     Lack of Transportation (Non-Medical): No      Food Insecurity: No Food Insecurity (4/10/2024)    Hunger Vital Sign     Worried About Running Out of Food in the Last Year: Never true     Ran Out of Food in the Last Year: Never true        Encounter Vitals  Blood Pressure : 118/68  Weight: 84.8 kg (187 lb)  Height: 182.9 cm (6')  BMI (Calculated): 25.36  Pain Score: No pain     Alert, oriented in no acute distress.  Eye contact is good, speech goal directed, affect calm.    Assessment and Plan. The following treatment and monitoring plan is recommended:    BMI 25.0-25.9,adult  BMI is 25.36 kg/m2. Provided education on heart healthy diet with adequate intake of fruits, vegetables, and whole grains. Encourage 30 minutes of moderate exercise 3-4 times a week.    Hyperlipidemia associated with type 2 diabetes mellitus (HCC)  Chronic, stable. Currently taking simvastatin 10 mg daily. Reports that he rides 3 miles on his stationary bicycle every morning and uses weights daily. Denies chest pain and claudication.    Type 2 diabetes mellitus with hyperglycemia, without long-term current use of insulin (HCC)  Chronic, stable. Most recent hemoglobin A1C was 6.3 in February 2023. Currently taking  metformin 500 mg daily. Routinely monitored by primary care provider.  --In-office urine protein screening ordered.     History of sepsis  Infection of prosthetic knee joint (HCC)  Chronic, ongoing. Reports diagnosis in February 2022 with completion of IV antibiotics. Currently taking penicillin 2,000 mg daily.     Encounter for colorectal cancer screening  Cologuard ordered.      Services suggested: No services needed at this time  Health Care Screening: Age-appropriate preventive services recommended by USPTF and ACIP covered by Medicare were discussed today. Services ordered if indicated and agreed upon by the patient.  Referrals offered: Community-based lifestyle interventions to reduce health risks and promote self-management and wellness, fall prevention, nutrition, physical activity, tobacco-use cessation, weight loss, and mental health services as per orders if indicated.    Discussion today about general wellness and lifestyle habits:    Prevent falls and reduce trip hazards; Cautioned about securing or removing rugs.  Have a working fire alarm and carbon monoxide detector.  Engage in regular physical activity and social activities.    Follow-up: No follow-ups on file.

## 2024-04-11 LAB
CREAT UR-MCNC: 133.04 MG/DL
MICROALBUMIN UR-MCNC: <1.2 MG/DL
MICROALBUMIN/CREAT UR: NORMAL MG/G (ref 0–30)

## 2025-06-19 ENCOUNTER — PATIENT OUTREACH (OUTPATIENT)
Dept: MEDICAL GROUP | Facility: MEDICAL CENTER | Age: 75
End: 2025-06-19
Payer: MEDICARE

## 2025-07-15 NOTE — CARE PLAN
The patient is Stable - Low risk of patient condition declining or worsening    Shift Goals  Clinical Goals: Pain control 3/10 or less  Patient Goals: Rest    Progress made toward(s) clinical / shift goals:  pain controlled 3/10 or less    Patient is not progressing towards the following goals:       No

## 2025-07-29 PROBLEM — T84.59XA INFECTION OF PROSTHETIC KNEE JOINT (HCC): Status: RESOLVED | Noted: 2023-02-22 | Resolved: 2025-07-29

## 2025-07-29 PROBLEM — Z96.659 INFECTION OF PROSTHETIC KNEE JOINT (HCC): Status: RESOLVED | Noted: 2023-02-22 | Resolved: 2025-07-29

## 2025-08-05 ENCOUNTER — OFFICE VISIT (OUTPATIENT)
Dept: FAMILY PLANNING/WOMEN'S HEALTH CLINIC | Facility: PHYSICIAN GROUP | Age: 75
End: 2025-08-05
Payer: MEDICARE

## 2025-08-05 VITALS
HEIGHT: 72 IN | SYSTOLIC BLOOD PRESSURE: 116 MMHG | BODY MASS INDEX: 26.33 KG/M2 | DIASTOLIC BLOOD PRESSURE: 66 MMHG | WEIGHT: 194.4 LBS | HEART RATE: 59 BPM | OXYGEN SATURATION: 95 %

## 2025-08-05 DIAGNOSIS — E78.5 HYPERLIPIDEMIA ASSOCIATED WITH TYPE 2 DIABETES MELLITUS (HCC): Primary | ICD-10-CM

## 2025-08-05 DIAGNOSIS — Z77.098 AGENT ORANGE EXPOSURE: ICD-10-CM

## 2025-08-05 DIAGNOSIS — E11.65 TYPE 2 DIABETES MELLITUS WITH HYPERGLYCEMIA, WITHOUT LONG-TERM CURRENT USE OF INSULIN (HCC): ICD-10-CM

## 2025-08-05 DIAGNOSIS — Z86.19 HISTORY OF SEPSIS: ICD-10-CM

## 2025-08-05 DIAGNOSIS — E11.69 HYPERLIPIDEMIA ASSOCIATED WITH TYPE 2 DIABETES MELLITUS (HCC): Primary | ICD-10-CM

## 2025-08-05 PROBLEM — Z12.12 ENCOUNTER FOR COLORECTAL CANCER SCREENING: Status: RESOLVED | Noted: 2024-04-10 | Resolved: 2025-08-05

## 2025-08-05 PROBLEM — Z12.11 ENCOUNTER FOR COLORECTAL CANCER SCREENING: Status: RESOLVED | Noted: 2024-04-10 | Resolved: 2025-08-05

## 2025-08-05 PROCEDURE — 3078F DIAST BP <80 MM HG: CPT

## 2025-08-05 PROCEDURE — G0439 PPPS, SUBSEQ VISIT: HCPCS

## 2025-08-05 PROCEDURE — 3074F SYST BP LT 130 MM HG: CPT

## 2025-08-05 PROCEDURE — 1126F AMNT PAIN NOTED NONE PRSNT: CPT

## 2025-08-05 RX ORDER — PENICILLIN V POTASSIUM 250 MG/1
250 TABLET, FILM COATED ORAL 2 TIMES DAILY
COMMUNITY

## 2025-08-05 RX ORDER — OXYCODONE HYDROCHLORIDE 5 MG/1
5 TABLET ORAL EVERY 6 HOURS PRN
COMMUNITY

## 2025-08-05 SDOH — ECONOMIC STABILITY: FOOD INSECURITY: WITHIN THE PAST 12 MONTHS, THE FOOD YOU BOUGHT JUST DIDN'T LAST AND YOU DIDN'T HAVE MONEY TO GET MORE.: NEVER TRUE

## 2025-08-05 SDOH — ECONOMIC STABILITY: HOUSING INSECURITY: IN THE LAST 12 MONTHS, WAS THERE A TIME WHEN YOU WERE NOT ABLE TO PAY THE MORTGAGE OR RENT ON TIME?: NO

## 2025-08-05 SDOH — ECONOMIC STABILITY: HOUSING INSECURITY: AT ANY TIME IN THE PAST 12 MONTHS, WERE YOU HOMELESS OR LIVING IN A SHELTER (INCLUDING NOW)?: NO

## 2025-08-05 SDOH — ECONOMIC STABILITY: FOOD INSECURITY: WITHIN THE PAST 12 MONTHS, YOU WORRIED THAT YOUR FOOD WOULD RUN OUT BEFORE YOU GOT THE MONEY TO BUY MORE.: NEVER TRUE

## 2025-08-05 SDOH — ECONOMIC STABILITY: FOOD INSECURITY: HOW HARD IS IT FOR YOU TO PAY FOR THE VERY BASICS LIKE FOOD, HOUSING, MEDICAL CARE, AND HEATING?: NOT HARD AT ALL

## 2025-08-05 SDOH — ECONOMIC STABILITY: TRANSPORTATION INSECURITY: IN THE PAST 12 MONTHS, HAS LACK OF TRANSPORTATION KEPT YOU FROM MEDICAL APPOINTMENTS OR FROM GETTING MEDICATIONS?: NO

## 2025-08-05 ASSESSMENT — PAIN SCALES - GENERAL: PAINLEVEL_OUTOF10: NO PAIN

## 2025-08-05 ASSESSMENT — ACTIVITIES OF DAILY LIVING (ADL)
LACK_OF_TRANSPORTATION: NO
BATHING_REQUIRES_ASSISTANCE: 0

## 2025-08-05 ASSESSMENT — PATIENT HEALTH QUESTIONNAIRE - PHQ9: CLINICAL INTERPRETATION OF PHQ2 SCORE: 0

## 2025-08-05 ASSESSMENT — ENCOUNTER SYMPTOMS: GENERAL WELL-BEING: EXCELLENT

## (undated) DEVICE — PAD PREP 24 X 48 CUFFED - (100/CA)

## (undated) DEVICE — SODIUM CHL. IRRIGATION 0.9% 3000ML (4EA/CA 65CA/PF)

## (undated) DEVICE — STAPLER SKIN DISP - (6/BX 10BX/CA) VISISTAT

## (undated) DEVICE — PACK LOWER EXTREMITY - (2/CA)

## (undated) DEVICE — TUBING CLEARLINK DUO-VENT - C-FLO (48EA/CA)

## (undated) DEVICE — TOWEL STOP TIMEOUT SAFETY FLAG (40EA/CA)

## (undated) DEVICE — BLADE 90X18X1.27MM SAW SAGITTAL

## (undated) DEVICE — TIP INTPLS HFLO ML ORFC BTRY - (12/CS)  FOR SURGILAV

## (undated) DEVICE — ELECTRODE DUAL RETURN W/ CORD - (50/PK)

## (undated) DEVICE — GLOVE BIOGEL INDICATOR SZ 8.5 SURGICAL PF LTX - (50/BX 4BX/CA)

## (undated) DEVICE — WATER IRRIGATION STERILE 1000ML (12EA/CA)

## (undated) DEVICE — MASK ANESTHESIA ADULT  - (100/CA)

## (undated) DEVICE — SUTURE GENERAL

## (undated) DEVICE — SUTURE 2-0 VICRYL PLUS CT-1 36 (36PK/BX)"

## (undated) DEVICE — TUBE CONNECTING SUCTION - CLEAR PLASTIC STERILE 72 IN (50EA/CA)

## (undated) DEVICE — PACK TOTAL KNEE  (1/CA)

## (undated) DEVICE — PADDING CAST 6 IN STERILE - 6 X 4 YDS (24/CA)

## (undated) DEVICE — Device

## (undated) DEVICE — GLOVE, LITE (PAIR)

## (undated) DEVICE — CHLORAPREP 26 ML APPLICATOR - ORANGE TINT(25/CA)

## (undated) DEVICE — HUMID-VENT HEAT AND MOISTURE EXCHANGE- (50/BX)

## (undated) DEVICE — SENSOR OXIMETER ADULT SPO2 RD SET (20EA/BX)

## (undated) DEVICE — LACTATED RINGERS INJ 1000 ML - (14EA/CA 60CA/PF)

## (undated) DEVICE — SODIUM CHL IRRIGATION 0.9% 1000ML (12EA/CA)

## (undated) DEVICE — BLADE SAGITTAL SAW DUAL CUT 25.0 X 90.0 X 1.27MM (1/EA)

## (undated) DEVICE — LENS/HOOD FOR SPACESUIT - (32/PK) PEEL AWAY FACE

## (undated) DEVICE — BLADE SURGICAL #15 - (50/BX 3BX/CA)

## (undated) DEVICE — GVL 3 STAT DISPOSABLE - (10/BX)

## (undated) DEVICE — CANISTER SUCTION RIGID RED 1500CC (40EA/CA)

## (undated) DEVICE — PROTECTOR ULNA NERVE - (36PR/CA)

## (undated) DEVICE — DRESSING ABDOMINAL PAD STERILE 8 X 10" (360EA/CA)"

## (undated) DEVICE — HANDPIECE 10FT INTPLS SCT PLS IRRIGATION HAND CONTROL SET (6/PK)

## (undated) DEVICE — GLOVE BIOGEL PI INDICATOR SZ 7.0 SURGICAL PF LF - (50/BX 4BX/CA)

## (undated) DEVICE — SET EXTENSION WITH 2 PORTS (48EA/CA) ***PART #2C8610 IS A SUBSTITUTE*****

## (undated) DEVICE — GLOVE BIOGEL SZ 7 SURGICAL PF LTX - (50PR/BX 4BX/CA)

## (undated) DEVICE — DRESSING AQUACEL AG ADVANTAGE 3.5 X 10" (10EA/BX)"

## (undated) DEVICE — GLOVE SURGICAL PROTEXIS PI 8.0 LF - (50PR/BX)

## (undated) DEVICE — GLOVE BIOGEL PI ULTRATOUCH SZ 7.5 SURGICAL PF LF -(50/BX 4BX/CA)

## (undated) DEVICE — MIXER BONE CEMENT REVOLUTION - W/FEMORAL PRESSURIZER (6/CA)

## (undated) DEVICE — TOURNIQUET CUFF 34 X 4 ONE PORT DISP - STERILE (10/BX)

## (undated) DEVICE — STOCKINET TUBULAR 6IN STERILE - 6 X 48YDS (25/CA)

## (undated) DEVICE — ELECTRODE 850 FOAM ADHESIVE - HYDROGEL RADIOTRNSPRNT (50/PK)

## (undated) DEVICE — HEAD HOLDER JUNIOR/ADULT

## (undated) DEVICE — SUCTION INSTRUMENT YANKAUER BULBOUS TIP W/O VENT (50EA/CA)

## (undated) DEVICE — SUTURE 1 VICRYL PLUSCT-1 27IN - (36/BX)

## (undated) DEVICE — SUTURE 2-0 PDS II CT-2 - (36/BX)

## (undated) DEVICE — TUBE E-T HI-LO CUFF 7.5MM (10EA/PK)

## (undated) DEVICE — PACK MAJOR ORTHO - (2EA/CA)

## (undated) DEVICE — DRAPE LARGE 3 QUARTER - (20/CA)

## (undated) DEVICE — GOWN WARMING STANDARD FLEX - (30/CA)

## (undated) DEVICE — BAG SPONGE COUNT 10.25 X 32 - BLUE (250/CA)

## (undated) DEVICE — BANDAGE ELASTIC STERILE VELCRO 6 X 5 YDS (25EA/CA)

## (undated) DEVICE — JIGS LEFT

## (undated) DEVICE — SYSTEM PREVENA INCISION MNGM - (1/EA)

## (undated) DEVICE — JIGS

## (undated) DEVICE — KIT ANESTHESIA W/CIRCUIT & 3/LT BAG W/FILTER (20EA/CA)